# Patient Record
Sex: MALE | Race: WHITE | NOT HISPANIC OR LATINO | Employment: UNEMPLOYED | URBAN - METROPOLITAN AREA
[De-identification: names, ages, dates, MRNs, and addresses within clinical notes are randomized per-mention and may not be internally consistent; named-entity substitution may affect disease eponyms.]

---

## 2017-03-10 ENCOUNTER — ALLSCRIPTS OFFICE VISIT (OUTPATIENT)
Dept: OTHER | Facility: OTHER | Age: 27
End: 2017-03-10

## 2018-01-13 NOTE — PROGRESS NOTES
Assessment    1  PTSD (post-traumatic stress disorder) (309 81) (F43 10)   2  Bipolar disorder (296 80) (F31 9)   3  Major depression, chronic (296 20) (F32 9)    Plan   Major depression, chronic    · BuPROPion HCl ER (SR) 200 MG Oral Tablet Extended Release 12 Hour; TAKE 1  TABLET TWICE DAILY    1 - Chelsy Lee (Physician Assistant) Physician Referral  Consult Only: the expectation is that the referring provider will communicate back to the patient on treatment options  Evaluation and Treatment: the expectation is that the referred to provider will communicate back to the patient on treatment options  Status: Hold For - Scheduling  Requested for: [id number]  Ordered; For: Bipolar disorder, Major depression, chronic, PTSD (post-traumatic stress disorder); Ordered By: Robby Umaña  Performed:   Due: [id number]     Discussion/Summary  Discussion Summary:   Long discussion with patient regarding his feelings, his situation, and his treatment options  He is very interested in seeing Chan Choudhary PA-C  She treats various psych issues and is welcoming to new patients  I have referred him to [facility] P[facility]  I filled his Wellbutrin today at 200mg BID  Will not write 300mg BID due to my own comfort level  Explained to patient that I will not prescribe any mood stabilizers  He knows that if he feels he is a threat to himself or others he needs to emergently return to the ER  Patient's Capacity to Self-Care: Patient is able to Self-Care  Medication SE Review and Pt Understands Tx: Possible side effects of new medications were reviewed with the patient/guardian today  The treatment plan was reviewed with the patient/guardian  The patient/guardian understands and agrees with the treatment plan   Counseling Documentation With Imm: total time of encounter was 30 minutes and 25 minutes was spent counseling  Chief Complaint  Chief Complaint Free Text Note Form: Patient is here today for chronic mental health issues  He is from [facility] and not overly familiar with the area  Lives in [place] with his fiance'  He tells me he has a history of PTSD, chronic depression, and bipolar  He was recently in [place] for inpatient mental health in January 2017  Was prescribed Wellbutrin and Depakote  He has been out of medication for 2 weeks  He describes feeling "like a machine"  Has racing thoughts, anger, depression, irritability  He denies SI/HI  He is trying to get a job behind [employer] through [employer]  In the past, he admits to self-medicating his mental health issues which resulted in "addiction problems " He says he wants to medically treat his issues in the proper fashion  I told him that I will not prescribe Depakote  He was supposed to follow up with "Antonio" after his inpatient stay, but he states no one ever called him and that his phone calls were unreturned  He feels like he is finding a lot of "dead ends "      History of Present Illness  HPI: See chief complaint  Review of Systems  Complete-Male:   Constitutional: no fever and no chills  Cardiovascular: no chest pain  Respiratory: no shortness of breath  Psychiatric: anxiety, sleep disturbances, depression, emotional problems and impulsive behavior, but not suicidal    ROS Reviewed:   ROS reviewed  Active Problems    1  Bipolar disorder (296 80) (F31 9)   2  Major depression, chronic (296 20) (F32 9)   3  PTSD (post-traumatic stress disorder) (309 81) (F43 10)    Past Medical History  Active Problems And Past Medical History Reviewed: The active problems and past medical history were reviewed and updated today  Surgical History    1  History of Appendectomy  Surgical History Reviewed: The surgical history was reviewed and updated today  Family History  Father    1  Family history of Bipolar 1 disorder   2  Family history of depression (V17 0) (Z81 8)  Family History Reviewed: The family history was reviewed and updated today         Social History    · Current every day smoker (305 1) (F17 200)   · No alcohol use   · No living will  Social History Reviewed: The social history was reviewed and updated today  The social history was reviewed and is unchanged  Allergies    1  Penicillins    Vitals  Vital Signs    Recorded: [id number] 79:58EW   Systolic 114, LUE, Sitting   Diastolic 72, LUE, Sitting   Height 5 ft 6 5 in   Weight 131 lb 8 0 oz   BMI Calculated 20 91   BSA Calculated 1 68     Physical Exam    Constitutional   General appearance: Abnormal   underweight, disheveled clothing and unkempt appearance  Ears, Nose, Mouth, and Throat   Otoscopic examination: Tympanic membrance translucent with normal light reflex  Canals patent without erythema  Pulmonary   Respiratory effort: No increased work of breathing or signs of respiratory distress  Auscultation of lungs: Clear to auscultation, equal breath sounds bilaterally, no wheezes, no rales, no rhonci  Cardiovascular   Auscultation of heart: Normal rate and rhythm, normal S1 and S2, without murmurs  Psychiatric   Orientation to person, place and time: Normal     Mood and affect: Abnormal   Mood and Affect: blunted and expansive          Signatures   Electronically signed by : Dave Rizo [facility]; Mar 10 2017 11:11AM EST                       (Author)    Electronically signed by : Juanita Dorantes DO; Mar 10 2017  4:35PM EST

## 2018-01-22 VITALS
DIASTOLIC BLOOD PRESSURE: 72 MMHG | BODY MASS INDEX: 20.64 KG/M2 | WEIGHT: 131.5 LBS | HEIGHT: 67 IN | SYSTOLIC BLOOD PRESSURE: 104 MMHG

## 2021-09-13 ENCOUNTER — APPOINTMENT (EMERGENCY)
Dept: RADIOLOGY | Facility: HOSPITAL | Age: 31
End: 2021-09-13
Payer: OTHER GOVERNMENT

## 2021-09-13 ENCOUNTER — HOSPITAL ENCOUNTER (EMERGENCY)
Facility: HOSPITAL | Age: 31
Discharge: HOME | End: 2021-09-13
Payer: OTHER GOVERNMENT

## 2021-09-13 VITALS
HEART RATE: 73 BPM | BODY MASS INDEX: 21.14 KG/M2 | DIASTOLIC BLOOD PRESSURE: 77 MMHG | TEMPERATURE: 98.6 F | RESPIRATION RATE: 18 BRPM | WEIGHT: 132.94 LBS | SYSTOLIC BLOOD PRESSURE: 137 MMHG | OXYGEN SATURATION: 95 %

## 2021-09-13 DIAGNOSIS — S62.309A METACARPAL BONE FRACTURE: Primary | ICD-10-CM

## 2021-09-13 PROCEDURE — 73130 X-RAY EXAM OF HAND: CPT

## 2021-09-13 PROCEDURE — 99284 EMERGENCY DEPT VISIT MOD MDM: CPT | Performed by: PHYSICIAN ASSISTANT

## 2021-09-13 PROCEDURE — 26605 TREAT METACARPAL FRACTURE: CPT | Performed by: PHYSICIAN ASSISTANT

## 2021-09-13 PROCEDURE — 73120 X-RAY EXAM OF HAND: CPT

## 2021-09-13 PROCEDURE — 99283 EMERGENCY DEPT VISIT LOW MDM: CPT

## 2021-09-13 RX ORDER — LIDOCAINE HYDROCHLORIDE AND EPINEPHRINE 10; 10 MG/ML; UG/ML
10 INJECTION, SOLUTION INFILTRATION; PERINEURAL ONCE
Status: COMPLETED | OUTPATIENT
Start: 2021-09-13 | End: 2021-09-13

## 2021-09-13 RX ADMIN — LIDOCAINE HYDROCHLORIDE,EPINEPHRINE BITARTRATE 10 ML: 10; .01 INJECTION, SOLUTION INFILTRATION; PERINEURAL at 20:30

## 2021-09-13 NOTE — ED PROVIDER NOTES
History  Chief Complaint   Patient presents with    Hand Pain     pt hit wall with left hand  c/o pain on left hand     Patient presents to the emergency department today for evaluation pain in the left hand  He presents in [facility] correction [facility]  He states approximately 4 hours ago he swung and hit a wall in anger  Complains of pain only in the left 5th metacarpal region  Denies any other injuries associated with this incident  None       History reviewed  No pertinent past medical history  History reviewed  No pertinent surgical history  History reviewed  No pertinent family history  I have reviewed and agree with the history as documented  E-Cigarette/Vaping     E-Cigarette/Vaping Substances     Social History     Tobacco Use    Smoking status: Current Every Day Smoker    Smokeless tobacco: Never Used   Substance Use Topics    Alcohol use: Not Currently    Drug use: Not Currently       Review of Systems   Constitutional: Negative  HENT: Negative  Eyes: Negative  Respiratory: Negative  Cardiovascular: Negative  Gastrointestinal: Negative  Endocrine: Negative  Genitourinary: Negative  Musculoskeletal: Negative  L hand pain/swelling   Skin: Negative  Allergic/Immunologic: Negative  Neurological: Negative  Hematological: Negative  Psychiatric/Behavioral: Negative  All other systems reviewed and are negative  Physical Exam  Physical Exam  Vitals reviewed  Constitutional:       General: He is not in acute distress  Appearance: He is normal weight  He is not ill-appearing, toxic-appearing or diaphoretic  HENT:      Head: Normocephalic and atraumatic  Eyes:      General: No scleral icterus  Right eye: No discharge  Left eye: No discharge  Extraocular Movements: Extraocular movements intact  Conjunctiva/sclera: Conjunctivae normal    Cardiovascular:      Rate and Rhythm: Normal rate        Pulses: Normal pulses  Pulmonary:      Effort: Pulmonary effort is normal       Breath sounds: Normal breath sounds  Musculoskeletal:         General: Swelling, tenderness and signs of injury present  Neurological:      General: No focal deficit present  Mental Status: He is alert and oriented to person, place, and time  Mental status is at baseline  Psychiatric:         Mood and Affect: Mood normal          Behavior: Behavior normal          Thought Content: Thought content normal          Judgment: Judgment normal          Vital Signs  ED Triage Vitals   Temperature Pulse Respirations Blood Pressure SpO2   09/13/21 1926 09/13/21 1926 09/13/21 1926 09/13/21 1926 09/13/21 1926   98 6 °F (37 °C) 74 18 135/75 96 %      Temp Source Heart Rate Source Patient Position - Orthostatic VS BP Location FiO2 (%)   09/13/21 1926 09/13/21 1926 -- 09/13/21 1926 --   Temporal Monitor  Right arm       Pain Score       09/13/21 1940       5           Vitals:    09/13/21 1926   BP: 135/75   Pulse: 74         Visual Acuity      ED Medications  Medications   lidocaine-epinephrine (XYLOCAINE/EPINEPHRINE) 1 %-1:100,000 injection 10 mL (has no administration in time range)       Diagnostic Studies  Results Reviewed     None                 XR hand 2 vw left   ED Interpretation by Tracee Pedersen PA-C (09/13 2123)   Slightly improved alignment fracture reduction however fracture still noted distal left 5th metacarpal      XR hand 3+ views LEFT   ED Interpretation by Tracee Pedersen PA-C (09/13 2015)   Displaced and angulated 5th metacarpal fracture distally which is closed in nature                 Procedures  Procedures     I performed a hematoma block utilizing 2 cc 1% lidocaine with epinephrine at the distal 5th metacarpal region, manipulation was performed followed by a short-arm ulnar gutter splint, will repeat imaging  I did personally placed an ulnar gutter splint       ED Course  ED Course as of Sep 13 2130   Mon Sep 13, 2021 1951 Blood Pressure: 135/75   1951 Temperature: 98 6 °F (37 °C)   1951 Pulse: 74   1951 Respirations: 18   1951 SpO2: 96 %                                           MDM    Disposition  Final diagnoses:   Metacarpal bone fracture - Left 5th, closed, displaced and angulated     Time reflects when diagnosis was documented in both MDM as applicable and the Disposition within this note     Time User Action Codes Description Comment    9/13/2021  8:15 PM Richard Quiñones [[id number]] Metacarpal bone fracture     9/13/2021  8:15 PM Yenifer Rosa Jacob Ian Metacarpal bone fracture Left 5th, closed, displaced and angulated      ED Disposition     ED Disposition Condition Date/Time Comment    Discharge Stable Mon Sep 13, 2021  9:23 PM [address] discharge to home/self care  Follow-up Information     Follow up With Specialties Details Why Contact Info    Yoselyn Carrero MD Orthopedic Surgery, Hand Surgery, Orthopedics Call in 1 day  [address] [address]  [phone]            Patient's Medications    No medications on file     No discharge procedures on file      PDMP Review     None          ED Provider  Electronically Signed by           Matthew Grove PA-C  09/13/21 8471

## 2021-09-21 ENCOUNTER — OFFICE VISIT (OUTPATIENT)
Dept: OBGYN CLINIC | Facility: CLINIC | Age: 31
End: 2021-09-21
Payer: OTHER GOVERNMENT

## 2021-09-21 VITALS — WEIGHT: 132 LBS | BODY MASS INDEX: 20 KG/M2 | HEIGHT: 68 IN

## 2021-09-21 DIAGNOSIS — S62.357A CLOSED NONDISPLACED FRACTURE OF SHAFT OF FIFTH METACARPAL BONE OF LEFT HAND, INITIAL ENCOUNTER: Primary | ICD-10-CM

## 2021-09-21 PROCEDURE — 26600 TREAT METACARPAL FRACTURE: CPT | Performed by: ORTHOPAEDIC SURGERY

## 2021-09-21 PROCEDURE — 99203 OFFICE O/P NEW LOW 30 MIN: CPT | Performed by: ORTHOPAEDIC SURGERY

## 2021-09-21 NOTE — PROGRESS NOTES
ASSESSMENT/PLAN:    Problem List Items Addressed This Visit     None      Visit Diagnoses     Closed nondisplaced fracture of shaft of fifth metacarpal bone of left hand, initial encounter    -  Primary    Relevant Orders    Durable Medical Equipment          The patient presented to the office today for initial evaluation of a 5th metacarpal fracture of the left hand, sustained on 9/13/2021  The patient was transitioned from his splint to a TKO brace today without complication  He was instructed to keep the brace on at all times  He was instructed to keep the hand immobilized for another 4 weeks  The patient is currently residing at a correctional facility, but is scheduled to be released within the month to return back to his home in [facility]  The patient was instructed to follow up in one month with an orthopedic provider in [facility] for further management of his fracture  the patient has a stable injury needing no surgery  Physical exam shows mild swelling  No rotational deformity  Tendon and ligament function intact  X-rays reviewed show a minimally displaced left 5th metacarpal fracture  The angulation is in acceptable position  He was placed in a TKO splint  He was instructed to wear this at all times  Follow up with Orthopedics at home in 4 weeks  At that time, he will need new x-rays of his right hand -three views  The patient is a prisoner and states by the time he needs follow-up visit, he will be out of this penile facility      _____________________________________________________  CHIEF COMPLAINT:  Chief Complaint   Patient presents with    Left Hand - Pain         SUBJECTIVE:  Tal Parkinson is a 28 y o  male who presents to the office today for initial evaluation of a left hand injury  He presents to us today from a corrections facility  Patient states that on 09/13/2021 he had punched a brick wall approximately 6 times    Patient states that he experienced immediate pain and swelling in his hand  He was taken to the ED that day, where x-rays revealed a displaced fracture of the 5th metacarpal  The patient was placed in a short arm ulnar gutter splint after reduction was performed  Post reduction x-rays revealed improved alignment of the fracture  Today the patient states that he has been maintaining the short-arm splint as instructed  He reports continued pain along the 5th meta carpal   Patient denies any numbness or tingling in the hand or fingers  The following portions of the patient's history were reviewed and updated as appropriate: allergies, current medications, past family history, past medical history, past social history, past surgical history and problem list     PAST MEDICAL HISTORY:  History reviewed  No pertinent past medical history  PAST SURGICAL HISTORY:  History reviewed  No pertinent surgical history  FAMILY HISTORY:  History reviewed  No pertinent family history  SOCIAL HISTORY:  Social History     Tobacco Use    Smoking status: Current Every Day Smoker    Smokeless tobacco: Never Used   Vaping Use    Vaping Use: Former   Substance Use Topics    Alcohol use: Not Currently    Drug use: Not Currently       MEDICATIONS:  No current outpatient medications on file  ALLERGIES:  Allergies   Allergen Reactions    Penicillins Anaphylaxis       ROS:  Review of Systems   Constitutional: Negative for chills and fever  Respiratory: Negative for cough and shortness of breath  Cardiovascular: Negative for chest pain  Gastrointestinal: Negative for abdominal pain and vomiting  Musculoskeletal: Positive for joint swelling (Left hand)  Pain along the 5th metacarpal left hand   Skin: Negative for color change and rash  Neurological: Negative for weakness and numbness  All other systems reviewed and are negative  Constitutional: Negative for fatigue, fever or loss of appetite  HENT: Negative      Respiratory: Negative for shortness of breath, dyspnea  Cardiovascular: Negative for chest pain/tightness  Gastrointestinal: Negative for abdominal pain, N/V  Endocrine: Negative for cold/heat intolerance, unexplained weight loss/gain  Genitourinary: Negative for flank pain, dysuria, hematuria  Musculoskeletal: Positive for arthralgia   Skin: Negative for rash  Neurological: Negative for numbness or tingling  Psychiatric/Behavioral: Negative for agitation  _____________________________________________________  PHYSICAL EXAMINATION:    Height 5' 8" (1 727 m), weight 59 9 kg (132 lb)  Constitutional: Oriented to person, place, and time  Appears well-developed and well-nourished  No distress  HENT:   Head: Normocephalic  Eyes: Conjunctivae are normal  Right eye exhibits no discharge  Left eye exhibits no discharge  No scleral icterus  Cardiovascular: Normal rate  Pulmonary/Chest: Effort normal    Neurological: Alert and oriented to person, place, and time  Skin: Skin is warm and dry  No rash noted  Not diaphoretic  No erythema  No pallor  Psychiatric: Normal mood and affect  Behavior is normal  Judgment and thought content normal       MUSCULOSKELETAL EXAMINATION:   Physical Exam  Constitutional:       Appearance: Normal appearance  HENT:      Head: Normocephalic and atraumatic  Mouth/Throat:      Pharynx: Oropharynx is clear  Eyes:      Extraocular Movements: Extraocular movements intact  Pupils: Pupils are equal, round, and reactive to light  Cardiovascular:      Pulses: Normal pulses  Pulmonary:      Effort: Pulmonary effort is normal  No respiratory distress  Breath sounds: Normal breath sounds  Abdominal:      Palpations: Abdomen is soft  Musculoskeletal:      Cervical back: Normal range of motion  Skin:     General: Skin is warm and dry  Capillary Refill: Capillary refill takes less than 2 seconds  Neurological:      General: No focal deficit present        Mental Status: He is alert Neurovascular status: Neurovascularly intact    Distal perfusion: normal    Neurological function: normal    Range of motion: reduced    Local anesthesia used?: No    Manipulation performed?: No    Immobilization:  Brace (TKA brace)  Neurovascular status: Neurovascularly intact    Distal perfusion: normal    Neurological function: normal    Range of motion: unchanged    Patient tolerance:  Patient tolerated the procedure well with no immediate complications                 Sylvan Mcardle, PA-C

## 2022-01-12 ENCOUNTER — AMB VIDEO VISIT (OUTPATIENT)
Dept: OTHER | Facility: HOSPITAL | Age: 32
End: 2022-01-12

## 2022-01-12 ENCOUNTER — HOSPITAL ENCOUNTER (EMERGENCY)
Facility: HOSPITAL | Age: 32
Discharge: HOME | End: 2022-01-12
Attending: EMERGENCY MEDICINE

## 2022-01-12 VITALS
HEART RATE: 110 BPM | RESPIRATION RATE: 18 BRPM | OXYGEN SATURATION: 97 % | TEMPERATURE: 99 F | WEIGHT: 160 LBS | SYSTOLIC BLOOD PRESSURE: 130 MMHG | BODY MASS INDEX: 24.33 KG/M2 | DIASTOLIC BLOOD PRESSURE: 71 MMHG

## 2022-01-12 DIAGNOSIS — Z76.0 MEDICATION REFILL: Primary | ICD-10-CM

## 2022-01-12 PROCEDURE — 99284 EMERGENCY DEPT VISIT MOD MDM: CPT | Performed by: EMERGENCY MEDICINE

## 2022-01-12 PROCEDURE — 99283 EMERGENCY DEPT VISIT LOW MDM: CPT

## 2022-01-12 RX ORDER — CITALOPRAM 20 MG/1
20 TABLET ORAL ONCE
Status: COMPLETED | OUTPATIENT
Start: 2022-01-12 | End: 2022-01-12

## 2022-01-12 RX ORDER — TRAZODONE HYDROCHLORIDE 100 MG/1
100 TABLET ORAL
Qty: 5 TABLET | Refills: 0 | Status: SHIPPED | OUTPATIENT
Start: 2022-01-12 | End: 2022-01-17

## 2022-01-12 RX ORDER — DIVALPROEX SODIUM 250 MG/1
250 TABLET, DELAYED RELEASE ORAL ONCE
Status: COMPLETED | OUTPATIENT
Start: 2022-01-12 | End: 2022-01-12

## 2022-01-12 RX ORDER — CITALOPRAM 20 MG/1
20 TABLET ORAL DAILY
Qty: 5 TABLET | Refills: 0 | Status: SHIPPED | OUTPATIENT
Start: 2022-01-12 | End: 2022-01-17

## 2022-01-12 RX ORDER — TRAZODONE HYDROCHLORIDE 50 MG/1
100 TABLET ORAL ONCE
Status: COMPLETED | OUTPATIENT
Start: 2022-01-12 | End: 2022-01-12

## 2022-01-12 RX ORDER — DIVALPROEX SODIUM 250 MG/1
250 TABLET, DELAYED RELEASE ORAL EVERY 8 HOURS SCHEDULED
Qty: 15 TABLET | Refills: 0 | Status: SHIPPED | OUTPATIENT
Start: 2022-01-12 | End: 2022-01-17

## 2022-01-12 RX ADMIN — DIVALPROEX SODIUM 250 MG: 250 TABLET, DELAYED RELEASE ORAL at 23:34

## 2022-01-12 RX ADMIN — TRAZODONE HYDROCHLORIDE 100 MG: 50 TABLET ORAL at 23:34

## 2022-01-12 RX ADMIN — CITALOPRAM HYDROBROMIDE 20 MG: 20 TABLET ORAL at 23:34

## 2022-01-12 NOTE — PROGRESS NOTES
Patient called in for video vist  Requesting  medication refill  He states he was recently discharged from the [facility] today  He states he was taking trazodone, Depakote and Celexa while there  States he did not discharged with this medication  Patient takes these medications for anxiety, P PTSD, depression  He does not have any documentation of this medication  He states he left the [facility] without any medication  He does not have medication list with him  Discussed with patient I am unable to refill these medications via a video visit  Discussed he should call the [facility] senior care to see if they can prescribe medications or get him a medication list   If he develops any increased anxiety, thoughts of self-harm, racing heart or other symptoms he should go directly to the emergency room  Patient verbalized understanding of this  Discussed with patient that this visit will be canceled since I am unable to provide what is needed

## 2022-01-13 NOTE — ED NOTES
Pt had bubble pack from the [facility] and  Sates he left without getting his med to take home   Pt doesn't have a pcp here yet    depakote 250mg 3 times daily  celexa 25mg daily  trazadone at night 100mg     Rema Zhong RN  01/12/22 8770

## 2022-01-13 NOTE — ED PROVIDER NOTES
History  Chief Complaint   Patient presents with    Medication Problem     pt wasnt able to get meds before leaving his correctional facility today  pt denies HI/SI      36-year-old male presents for evaluation of medication refill  Patient states he was just released from a correctional facility this morning and was unable to get his daily medications  Patient had a video call with his PCP but was also unable to get his prescriptions  Patient called the correctional facility and was told that they would send his prescriptions to [facility] in [place] within the next few days  Patient denies any suicidal ideations but states he started to feel anxious because he did not want to abruptly stop his medications  No further symptoms at this time  Patient states he has been taking these 3 medications daily while he was in the correctional facility  None       History reviewed  No pertinent past medical history  History reviewed  No pertinent surgical history  History reviewed  No pertinent family history  I have reviewed and agree with the history as documented  E-Cigarette/Vaping    E-Cigarette Use Former User      E-Cigarette/Vaping Substances    Nicotine No     THC No     CBD No     Flavoring No     Other No     Unknown No      Social History     Tobacco Use    Smoking status: Current Every Day Smoker     Packs/day: 0 25     Types: Cigarettes    Smokeless tobacco: Never Used   Vaping Use    Vaping Use: Former   Substance Use Topics    Alcohol use: Not Currently    Drug use: Not Currently       Review of Systems   Constitutional: Negative for fever  Psychiatric/Behavioral: Negative for suicidal ideas  The patient is nervous/anxious  All other systems reviewed and are negative  Physical Exam  Physical Exam  Vitals and nursing note reviewed  Constitutional:       General: He is not in acute distress  Appearance: He is well-developed     HENT:      Head: Normocephalic and atraumatic  Right Ear: External ear normal       Left Ear: External ear normal       Nose: Nose normal    Eyes:      General: No scleral icterus  Pulmonary:      Effort: Pulmonary effort is normal  No respiratory distress  Abdominal:      General: There is no distension  Palpations: Abdomen is soft  Musculoskeletal:         General: No deformity  Normal range of motion  Cervical back: Normal range of motion and neck supple  Skin:     General: Skin is warm  Findings: No rash  Neurological:      General: No focal deficit present  Mental Status: He is alert  Gait: Gait normal    Psychiatric:         Mood and Affect: Mood normal          Vital Signs  ED Triage Vitals [01/12/22 2255]   Temperature Pulse Respirations Blood Pressure SpO2   99 °F (37 2 °C) (!) 110 18 130/71 97 %      Temp src Heart Rate Source Patient Position - Orthostatic VS BP Location FiO2 (%)   -- Monitor Sitting Right arm --      Pain Score       No Pain           Vitals:    01/12/22 2255   BP: 130/71   Pulse: (!) 110   Patient Position - Orthostatic VS: Sitting         Visual Acuity      ED Medications  Medications   divalproex sodium (DEPAKOTE) EC tablet 250 mg (has no administration in time range)   citalopram (CeleXA) tablet 20 mg (has no administration in time range)   traZODone (DESYREL) tablet 100 mg (has no administration in time range)       Diagnostic Studies  Results Reviewed     None                 No orders to display              Procedures  Procedures         ED Course                               SBIRT 22yo+      Most Recent Value   SBIRT (22 yo +)    In order to provide better care to our patients, we are screening all of our patients for alcohol and drug use  Would it be okay to ask you these screening questions? Yes Filed at: 01/12/2022 2307   Initial Alcohol Screen: US AUDIT-C     1  How often do you have a drink containing alcohol? 0 Filed at: 01/12/2022 2307   2   How many drinks containing alcohol do you have on a typical day you are drinking? 0 Filed at: 01/12/2022 2307   3a  Male UNDER 65: How often do you have five or more drinks on one occasion? 0 Filed at: 01/12/2022 2307   3b  FEMALE Any Age, or MALE 65+: How often do you have 4 or more drinks on one occassion? 0 Filed at: 01/12/2022 2307   Audit-C Score 0 Filed at: 01/12/2022 2307   EZRA: How many times in the past year have you    Used an illegal drug or used a prescription medication for non-medical reasons? Never Filed at: 01/12/2022 2307                    MDM  Number of Diagnoses or Management Options  Medication refill: new and requires workup  Diagnosis management comments: 59-year-old male presenting for medication refill  Patient was released this morning and did not get his daily medications  Will provide dose while in emergency department and also provide 5 days worth of medications until he can get the prescriptions from group home  Amount and/or Complexity of Data Reviewed  Tests in the radiology section of CPT®: reviewed  Tests in the medicine section of CPT®: reviewed and ordered  Obtain history from someone other than the patient: yes  Review and summarize past medical records: yes        Disposition  Final diagnoses:   Medication refill     Time reflects when diagnosis was documented in both MDM as applicable and the Disposition within this note     Time User Action Codes Description Comment    1/12/2022 11:13 PM Manual Rosie Add [Z76 0] Medication refill       ED Disposition     ED Disposition Condition Date/Time Comment    Discharge Stable Wed Jan 12, 2022 11:13 PM [facility] [employer] discharge to home/self care              Follow-up Information     Follow up With Specialties Details Why Contact Info Additional [address],  Family Medicine   [address] [place] [address] [facility] Emergency Department Emergency Medicine  If symptoms worsen [address]  [facility] [address]  [address] [facility] Emergency Department, [address] [place], [facility], [address]          Patient's Medications   Discharge Prescriptions    CITALOPRAM (CELEXA) 20 MG TABLET    Take 1 tablet (20 mg total) by mouth daily for 5 days       Start Date: 1/12/2022 End Date: 1/17/2022       Order Dose: 20 mg       Quantity: 5 tablet    Refills: 0    DIVALPROEX SODIUM (DEPAKOTE) 250 MG EC TABLET    Take 1 tablet (250 mg total) by mouth every 8 (eight) hours for 5 days       Start Date: 1/12/2022 End Date: 1/17/2022       Order Dose: 250 mg       Quantity: 15 tablet    Refills: 0    TRAZODONE (DESYREL) 100 MG TABLET    Take 1 tablet (100 mg total) by mouth daily at bedtime for 5 days       Start Date: 1/12/2022 End Date: 1/17/2022       Order Dose: 100 mg       Quantity: 5 tablet    Refills: 0       No discharge procedures on file      PDMP Review     None          ED Provider  Electronically Signed by           Janet Paez DO  01/12/22 9616

## 2022-12-15 ENCOUNTER — APPOINTMENT (OUTPATIENT)
Dept: URGENT CARE | Facility: CLINIC | Age: 32
End: 2022-12-15

## 2023-02-24 ENCOUNTER — TELEPHONE (OUTPATIENT)
Dept: PSYCHIATRY | Facility: CLINIC | Age: 33
End: 2023-02-24

## 2023-04-07 ENCOUNTER — TELEPHONE (OUTPATIENT)
Dept: PSYCHIATRY | Facility: CLINIC | Age: 33
End: 2023-04-07

## 2023-04-07 NOTE — TELEPHONE ENCOUNTER
"Good Morning Theodoresaritha Coronakarlo- We are looking forward to your appointment next week  There is necessary paperwork that must be read, signed and completed prior to your Virtual Visit with Galen Divina on Tuesday, 4/11/23, at 10:00 am   We will check back 30-60 minutes prior to your appointment to make sure the paperwork is complete; otherwise, we may have to temporarily cancel this appointment until the paperwork is completed and signed  Please call us at [phone] if you need assistance- we are here to help  You can sign the paperwork by registering for [employer], and completing the forms online digitally, or you can print the attached document, complete, sign and resend it to us through email or fax  You can reply to this email and attach file, or fax it to us at [phone]  If you decide to fax or email, please also send a copy of your ’s license so we can confirm your identity  Again, please call us at [phone] if you have any questions or concerns  If you are having trouble locating your documents in [employer], please see the attached how to [facility] guide  If you need help setting up [employer], instructions on how to register are also attached  You can also call [phone]), select option 5, or visit our [employer] website online at [facility] and they will have a specialist walk you through the steps      "

## 2023-06-17 ENCOUNTER — HOSPITAL ENCOUNTER (EMERGENCY)
Facility: HOSPITAL | Age: 33
End: 2023-06-17
Attending: EMERGENCY MEDICINE

## 2023-06-17 ENCOUNTER — HOSPITAL ENCOUNTER (INPATIENT)
Facility: HOSPITAL | Age: 33
LOS: 5 days | Discharge: HOME | End: 2023-06-22
Attending: STUDENT IN AN ORGANIZED HEALTH CARE EDUCATION/TRAINING PROGRAM | Admitting: STUDENT IN AN ORGANIZED HEALTH CARE EDUCATION/TRAINING PROGRAM
Payer: MEDICARE

## 2023-06-17 VITALS
SYSTOLIC BLOOD PRESSURE: 120 MMHG | OXYGEN SATURATION: 98 % | TEMPERATURE: 98.2 F | HEART RATE: 99 BPM | DIASTOLIC BLOOD PRESSURE: 77 MMHG | RESPIRATION RATE: 18 BRPM

## 2023-06-17 DIAGNOSIS — F15.10 METHAMPHETAMINE USE (HCC): ICD-10-CM

## 2023-06-17 DIAGNOSIS — F43.10 PTSD (POST-TRAUMATIC STRESS DISORDER): ICD-10-CM

## 2023-06-17 DIAGNOSIS — Z72.0 TOBACCO ABUSE: ICD-10-CM

## 2023-06-17 DIAGNOSIS — F31.9 BIPOLAR DISORDER (HCC): Primary | ICD-10-CM

## 2023-06-17 DIAGNOSIS — F90.9 ADHD: ICD-10-CM

## 2023-06-17 DIAGNOSIS — F32.A DEPRESSION WITH SUICIDAL IDEATION: Primary | ICD-10-CM

## 2023-06-17 DIAGNOSIS — R45.851 DEPRESSION WITH SUICIDAL IDEATION: Primary | ICD-10-CM

## 2023-06-17 LAB
AMPHETAMINES SERPL QL SCN: POSITIVE
BARBITURATES UR QL: NEGATIVE
BENZODIAZ UR QL: NEGATIVE
COCAINE UR QL: NEGATIVE
ETHANOL EXG-MCNC: 0 MG/DL
METHADONE UR QL: NEGATIVE
OPIATES UR QL SCN: NEGATIVE
OXYCODONE+OXYMORPHONE UR QL SCN: NEGATIVE
PCP UR QL: NEGATIVE
SARS-COV-2 RNA RESP QL NAA+PROBE: NEGATIVE
THC UR QL: POSITIVE

## 2023-06-17 PROCEDURE — 87635 SARS-COV-2 COVID-19 AMP PRB: CPT | Performed by: EMERGENCY MEDICINE

## 2023-06-17 PROCEDURE — 99285 EMERGENCY DEPT VISIT HI MDM: CPT

## 2023-06-17 PROCEDURE — 80307 DRUG TEST PRSMV CHEM ANLYZR: CPT | Performed by: EMERGENCY MEDICINE

## 2023-06-17 PROCEDURE — 82075 ASSAY OF BREATH ETHANOL: CPT | Performed by: EMERGENCY MEDICINE

## 2023-06-17 RX ORDER — POLYETHYLENE GLYCOL 3350 17 G/17G
17 POWDER, FOR SOLUTION ORAL DAILY PRN
Status: CANCELLED | OUTPATIENT
Start: 2023-06-17

## 2023-06-17 RX ORDER — ACETAMINOPHEN 325 MG/1
650 TABLET ORAL EVERY 6 HOURS PRN
Status: DISCONTINUED | OUTPATIENT
Start: 2023-06-17 | End: 2023-06-22 | Stop reason: HOSPADM

## 2023-06-17 RX ORDER — OLANZAPINE 10 MG/1
5 INJECTION, POWDER, LYOPHILIZED, FOR SOLUTION INTRAMUSCULAR
Status: CANCELLED | OUTPATIENT
Start: 2023-06-17

## 2023-06-17 RX ORDER — BENZTROPINE MESYLATE 1 MG/1
1 TABLET ORAL
Status: DISCONTINUED | OUTPATIENT
Start: 2023-06-17 | End: 2023-06-22 | Stop reason: HOSPADM

## 2023-06-17 RX ORDER — BISACODYL 10 MG
10 SUPPOSITORY, RECTAL RECTAL DAILY PRN
Status: CANCELLED | OUTPATIENT
Start: 2023-06-17

## 2023-06-17 RX ORDER — BENZTROPINE MESYLATE 1 MG/ML
1 INJECTION INTRAMUSCULAR; INTRAVENOUS
Status: CANCELLED | OUTPATIENT
Start: 2023-06-17

## 2023-06-17 RX ORDER — AMOXICILLIN 250 MG
1 CAPSULE ORAL DAILY PRN
Status: CANCELLED | OUTPATIENT
Start: 2023-06-17

## 2023-06-17 RX ORDER — ACETAMINOPHEN 325 MG/1
975 TABLET ORAL EVERY 6 HOURS PRN
Status: DISCONTINUED | OUTPATIENT
Start: 2023-06-17 | End: 2023-06-22 | Stop reason: HOSPADM

## 2023-06-17 RX ORDER — NICOTINE 21 MG/24HR
21 PATCH, TRANSDERMAL 24 HOURS TRANSDERMAL ONCE
Status: DISCONTINUED | OUTPATIENT
Start: 2023-06-18 | End: 2023-06-18

## 2023-06-17 RX ORDER — PROPRANOLOL HYDROCHLORIDE 10 MG/1
10 TABLET ORAL EVERY 8 HOURS PRN
Status: DISCONTINUED | OUTPATIENT
Start: 2023-06-17 | End: 2023-06-22 | Stop reason: HOSPADM

## 2023-06-17 RX ORDER — ACETAMINOPHEN 325 MG/1
975 TABLET ORAL EVERY 6 HOURS PRN
Status: CANCELLED | OUTPATIENT
Start: 2023-06-17

## 2023-06-17 RX ORDER — HYDROXYZINE HYDROCHLORIDE 25 MG/1
25 TABLET, FILM COATED ORAL
Status: CANCELLED | OUTPATIENT
Start: 2023-06-17

## 2023-06-17 RX ORDER — ACETAMINOPHEN 325 MG/1
650 TABLET ORAL EVERY 6 HOURS PRN
Status: CANCELLED | OUTPATIENT
Start: 2023-06-17

## 2023-06-17 RX ORDER — BENZTROPINE MESYLATE 1 MG/ML
1 INJECTION INTRAMUSCULAR; INTRAVENOUS
Status: DISCONTINUED | OUTPATIENT
Start: 2023-06-17 | End: 2023-06-22 | Stop reason: HOSPADM

## 2023-06-17 RX ORDER — OLANZAPINE 2.5 MG/1
2.5 TABLET ORAL
Status: CANCELLED | OUTPATIENT
Start: 2023-06-17

## 2023-06-17 RX ORDER — TRAZODONE HYDROCHLORIDE 50 MG/1
50 TABLET ORAL
Status: DISCONTINUED | OUTPATIENT
Start: 2023-06-17 | End: 2023-06-22 | Stop reason: HOSPADM

## 2023-06-17 RX ORDER — OLANZAPINE 10 MG/1
2.5 INJECTION, POWDER, LYOPHILIZED, FOR SOLUTION INTRAMUSCULAR
Status: CANCELLED | OUTPATIENT
Start: 2023-06-17

## 2023-06-17 RX ORDER — HYDROXYZINE 50 MG/1
50 TABLET, FILM COATED ORAL
Status: DISCONTINUED | OUTPATIENT
Start: 2023-06-17 | End: 2023-06-22 | Stop reason: HOSPADM

## 2023-06-17 RX ORDER — DIPHENHYDRAMINE HYDROCHLORIDE 50 MG/ML
50 INJECTION INTRAMUSCULAR; INTRAVENOUS EVERY 6 HOURS PRN
Status: CANCELLED | OUTPATIENT
Start: 2023-06-17

## 2023-06-17 RX ORDER — DIPHENHYDRAMINE HYDROCHLORIDE 50 MG/ML
50 INJECTION INTRAMUSCULAR; INTRAVENOUS EVERY 6 HOURS PRN
Status: DISCONTINUED | OUTPATIENT
Start: 2023-06-17 | End: 2023-06-22 | Stop reason: HOSPADM

## 2023-06-17 RX ORDER — AMOXICILLIN 250 MG
1 CAPSULE ORAL DAILY PRN
Status: DISCONTINUED | OUTPATIENT
Start: 2023-06-17 | End: 2023-06-22 | Stop reason: HOSPADM

## 2023-06-17 RX ORDER — NICOTINE 21 MG/24HR
21 PATCH, TRANSDERMAL 24 HOURS TRANSDERMAL ONCE
Status: CANCELLED | OUTPATIENT
Start: 2023-06-18

## 2023-06-17 RX ORDER — OLANZAPINE 5 MG/1
5 TABLET ORAL
Status: CANCELLED | OUTPATIENT
Start: 2023-06-17

## 2023-06-17 RX ORDER — OLANZAPINE 10 MG/1
5 INJECTION, POWDER, LYOPHILIZED, FOR SOLUTION INTRAMUSCULAR
Status: DISCONTINUED | OUTPATIENT
Start: 2023-06-17 | End: 2023-06-22 | Stop reason: HOSPADM

## 2023-06-17 RX ORDER — OLANZAPINE 10 MG/1
2.5 INJECTION, POWDER, LYOPHILIZED, FOR SOLUTION INTRAMUSCULAR
Status: DISCONTINUED | OUTPATIENT
Start: 2023-06-17 | End: 2023-06-22 | Stop reason: HOSPADM

## 2023-06-17 RX ORDER — MAGNESIUM HYDROXIDE/ALUMINUM HYDROXICE/SIMETHICONE 120; 1200; 1200 MG/30ML; MG/30ML; MG/30ML
30 SUSPENSION ORAL EVERY 4 HOURS PRN
Status: DISCONTINUED | OUTPATIENT
Start: 2023-06-17 | End: 2023-06-22 | Stop reason: HOSPADM

## 2023-06-17 RX ORDER — HYDROXYZINE HYDROCHLORIDE 25 MG/1
100 TABLET, FILM COATED ORAL
Status: CANCELLED | OUTPATIENT
Start: 2023-06-17

## 2023-06-17 RX ORDER — HYDROXYZINE 50 MG/1
100 TABLET, FILM COATED ORAL
Status: DISCONTINUED | OUTPATIENT
Start: 2023-06-17 | End: 2023-06-22 | Stop reason: HOSPADM

## 2023-06-17 RX ORDER — LORAZEPAM 2 MG/ML
2 INJECTION INTRAMUSCULAR EVERY 6 HOURS PRN
Status: CANCELLED | OUTPATIENT
Start: 2023-06-17

## 2023-06-17 RX ORDER — OLANZAPINE 5 MG/1
5 TABLET ORAL
Status: DISCONTINUED | OUTPATIENT
Start: 2023-06-17 | End: 2023-06-22 | Stop reason: HOSPADM

## 2023-06-17 RX ORDER — POLYETHYLENE GLYCOL 3350 17 G/17G
17 POWDER, FOR SOLUTION ORAL DAILY PRN
Status: DISCONTINUED | OUTPATIENT
Start: 2023-06-17 | End: 2023-06-22 | Stop reason: HOSPADM

## 2023-06-17 RX ORDER — ACETAMINOPHEN 325 MG/1
650 TABLET ORAL EVERY 4 HOURS PRN
Status: CANCELLED | OUTPATIENT
Start: 2023-06-17

## 2023-06-17 RX ORDER — NICOTINE 21 MG/24HR
21 PATCH, TRANSDERMAL 24 HOURS TRANSDERMAL ONCE
Status: DISCONTINUED | OUTPATIENT
Start: 2023-06-17 | End: 2023-06-17 | Stop reason: HOSPADM

## 2023-06-17 RX ORDER — BISACODYL 10 MG
10 SUPPOSITORY, RECTAL RECTAL DAILY PRN
Status: DISCONTINUED | OUTPATIENT
Start: 2023-06-17 | End: 2023-06-22 | Stop reason: HOSPADM

## 2023-06-17 RX ORDER — PROPRANOLOL HYDROCHLORIDE 20 MG/1
10 TABLET ORAL EVERY 8 HOURS PRN
Status: CANCELLED | OUTPATIENT
Start: 2023-06-17

## 2023-06-17 RX ORDER — TRAZODONE HYDROCHLORIDE 50 MG/1
50 TABLET ORAL
Status: CANCELLED | OUTPATIENT
Start: 2023-06-17

## 2023-06-17 RX ORDER — HYDROXYZINE HYDROCHLORIDE 25 MG/1
25 TABLET, FILM COATED ORAL
Status: DISCONTINUED | OUTPATIENT
Start: 2023-06-17 | End: 2023-06-22 | Stop reason: HOSPADM

## 2023-06-17 RX ORDER — LORAZEPAM 2 MG/ML
2 INJECTION INTRAMUSCULAR EVERY 6 HOURS PRN
Status: DISCONTINUED | OUTPATIENT
Start: 2023-06-17 | End: 2023-06-22 | Stop reason: HOSPADM

## 2023-06-17 RX ORDER — OLANZAPINE 2.5 MG/1
2.5 TABLET ORAL
Status: DISCONTINUED | OUTPATIENT
Start: 2023-06-17 | End: 2023-06-22 | Stop reason: HOSPADM

## 2023-06-17 RX ORDER — MAGNESIUM HYDROXIDE/ALUMINUM HYDROXICE/SIMETHICONE 120; 1200; 1200 MG/30ML; MG/30ML; MG/30ML
30 SUSPENSION ORAL EVERY 4 HOURS PRN
Status: CANCELLED | OUTPATIENT
Start: 2023-06-17

## 2023-06-17 RX ORDER — HYDROXYZINE HYDROCHLORIDE 25 MG/1
50 TABLET, FILM COATED ORAL
Status: CANCELLED | OUTPATIENT
Start: 2023-06-17

## 2023-06-17 RX ORDER — BENZTROPINE MESYLATE 1 MG/1
1 TABLET ORAL
Status: CANCELLED | OUTPATIENT
Start: 2023-06-17

## 2023-06-17 RX ORDER — ACETAMINOPHEN 325 MG/1
650 TABLET ORAL EVERY 4 HOURS PRN
Status: DISCONTINUED | OUTPATIENT
Start: 2023-06-17 | End: 2023-06-22 | Stop reason: HOSPADM

## 2023-06-17 RX ADMIN — NICOTINE POLACRILEX 2 MG: 4 GUM, CHEWING BUCCAL at 17:33

## 2023-06-17 RX ADMIN — NICOTINE 21 MG: 21 PATCH, EXTENDED RELEASE TRANSDERMAL at 11:18

## 2023-06-17 RX ADMIN — ACETAMINOPHEN 325MG 650 MG: 325 TABLET ORAL at 18:26

## 2023-06-17 RX ADMIN — TRAZODONE HYDROCHLORIDE 50 MG: 50 TABLET ORAL at 21:53

## 2023-06-17 NOTE — PLAN OF CARE
Problem: SELF HARM/SUICIDALITY  Goal: Will have no self-injury during hospital stay  Description: INTERVENTIONS:  - Q 15 MINUTES: Routine safety checks  - Q WAKING SHIFT & PRN: Assess risk to determine if routine checks are adequate to maintain patient safety  - Encourage patient to participate actively in care by formulating a plan to combat response to suicidal ideation, identify supports and resources  Outcome: Progressing     Problem: DEPRESSION  Goal: Will be euthymic at discharge  Description: INTERVENTIONS:  - Administer medication as ordered  - Provide emotional support via 1:1 interaction with staff  - Encourage involvement in milieu/groups/activities  - Monitor for social isolation  Outcome: Progressing     Problem: ANXIETY  Goal: Will report anxiety at manageable levels  Description: INTERVENTIONS:  - Administer medication as ordered  - Teach and encourage coping skills  - Provide emotional support  - Assess patient/family for anxiety and ability to cope  Outcome: Progressing  Goal: By discharge: Patient will verbalize 2 strategies to deal with anxiety  Description: Interventions:  - Identify any obvious source/trigger to anxiety  - Staff will assist patient in applying identified coping technique/skills  - Encourage attendance of scheduled groups and activities  Outcome: Progressing     Problem: SUBSTANCE USE/ABUSE  Goal: Will have no detox symptoms and will verbalize plan for changing substance-related behavior  Description: INTERVENTIONS:  - Monitor physical status and assess for symptoms of withdrawal  - Administer medication as ordered  - Provide emotional support with 1 on 1 interaction with staff  - Encourage recovery focused program/ addiction education  - Assess for verbalization of changing behaviors related to substance abuse  - Initiate consults and referrals as appropriate (Case Management, Spiritual Care, etc )  Outcome: Progressing  Goal: By discharge, will develop insight into their chemical dependency and sustain motivation to continue in recovery  Description: INTERVENTIONS:  - Attends all daily group sessions and scheduled AA groups  - Actively practices coping skills through participation in the therapeutic community and adherence to program rules  - Reviews and completes assignments from individual treatment plan  - Assist patient development of understanding of their personal cycle of addiction and relapse triggers  Outcome: Progressing  Goal: By discharge, patient will have ongoing treatment plan addressing chemical dependency  Description: INTERVENTIONS:  - Assist patient with resources and/or appointments for ongoing recovery based living  Outcome: Progressing     Problem: SLEEP DISTURBANCE  Goal: Will exhibit normal sleeping pattern  Description: Interventions:  -  Assess the patients sleep pattern, noting recent changes  - Administer medication as ordered  - Decrease environmental stimuli, including noise, as appropriate during the night  - Encourage the patient to actively participate in unit groups and or exercise during the day to enhance ability to achieve adequate sleep at night  - Assess the patient, in the morning, encouraging a description of sleep experience  Outcome: Progressing

## 2023-06-17 NOTE — ED NOTES
Insurance Authorization for admission:   Phone call placed to [facility]    Phone number:[phone]   Spoke to [address] w Medicare Part A- Patient has not utilized any [employer] days   Level of care:inpatient Review on Day of admission and they will give authorization #    Mary Reddy
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
## 2023-06-17 NOTE — ED NOTES
Patient is accepted at [facility]  Patient is accepted by Dr Yunier Rawls and the attending Dr Jayy Rosales **  Transportation is arranged with [facility]  Transportation is scheduled for TBD  Patient may go to the floor [phone] report is to be called to [phone] prior to patient transfer        Rock Dede MS
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
## 2023-06-17 NOTE — ED NOTES
Spoke with [employer] and they are reporting that Patient has [facility] [employer] [facility]      So a COB needs to be done once we find a bed     Karen Chapman MS

## 2023-06-17 NOTE — NURSING NOTE
Patient belongings in locker:    1 pair of basketball  1 pair sunglasses  1 cologne spray  1 pair ear buds  1 watch  1 white tshirt sleevless  1 carry-on grocery bag      At bedside:    2 pairs underwear  1 black tshirt
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
## 2023-06-17 NOTE — EMTALA/ACUTE CARE TRANSFER
[facility] EMERGENCY DEPARTMENT  [address]  [address] [facility] [address]  Dept: [phone]      QSLQGH TRANSFER CONSENT    NAME Gisele Xie                                         1990                              MRN [id number]    I have been informed of my rights regarding examination, treatment, and transfer   by Dr Robyn Watts DO    Benefits:      Risks:        Consent for Transfer:  I acknowledge that my medical condition has been evaluated and explained to me by the emergency department physician or other qualified medical person and/or my attending physician, who has recommended that I be transferred to the service of  Accepting Physician: Dr Bogdan Alba and Dr Reji Dnaiel at [address] Name, [address] : Dannie Finely [address]  The above potential benefits of such transfer, the potential risks associated with such transfer, and the probable risks of not being transferred have been explained to me, and I fully understand them  The doctor has explained that, in my case, the benefits of transfer outweigh the risks  I agree to be transferred  I authorize the performance of emergency medical procedures and treatments upon me in both transit and upon arrival at the receiving facility  Additionally, I authorize the release of any and all medical records to the receiving facility and request they be transported with me, if possible  I understand that the safest mode of transportation during a medical emergency is an ambulance and that the Hospital advocates the use of this mode of transport  Risks of traveling to the receiving facility by car, including absence of medical control, life sustaining equipment, such as oxygen, and medical personnel has been explained to me and I fully understand them      (RUI CORRECT BOX BELOW)  [  ]  I consent to the stated transfer and to be transported by ambulance/helicopter  [  ]  I consent to the stated transfer, but refuse transportation by ambulance and accept full responsibility for my transportation by car  I understand the risks of non-ambulance transfers and I exonerate the Hospital and its staff from any deterioration in my condition that results from this refusal     X___________________________________________    DATE  23  TIME________  Signature of patient or legally responsible individual signing on patient behalf           RELATIONSHIP TO PATIENT_________________________          Provider Certification    NAME Kelsi Shaffer                                         1990                              MRN [id number]    A medical screening exam was performed on the above named patient  Based on the examination:    Condition Necessitating Transfer The primary encounter diagnosis was Depression with suicidal ideation  Diagnoses of PTSD (post-traumatic stress disorder), ADHD, and [facility] use ([facility] ) were also pertinent to this visit  Patient Condition:      Reason for Transfer:      Transfer Requirements: [address], [address]   · Space available and qualified personnel available for treatment as acknowledged by    · Agreed to accept transfer and to provide appropriate medical treatment as acknowledged by       Dr Kris Velez and Dr Elizbeth Habermann  · Appropriate medical records of the examination and treatment of the patient are provided at the time of transfer   [address], [address] _______  · Transfer will be performed by qualified personnel from    and appropriate transfer equipment as required, including the use of necessary and appropriate life support measures      Provider Certification: I have examined the patient and explained the following risks and benefits of being transferred/refusing transfer to the patient/family:         Based on these reasonable risks and benefits to the patient and/or the unborn child(alissa), and based upon the information available at the time of the patient’s examination, I certify that the medical benefits reasonably to be expected from the provision of appropriate medical treatments at another medical facility outweigh the increasing risks, if any, to the individual’s medical condition, and in the case of labor to the unborn child, from effecting the transfer      X____________________________________________ DATE 06/17/23        TIME_______      ORIGINAL - SEND TO MEDICAL RECORDS   COPY - SEND WITH PATIENT DURING TRANSFER

## 2023-06-18 PROBLEM — F12.90 CANNABIS USE DISORDER: Status: ACTIVE | Noted: 2023-06-18

## 2023-06-18 PROBLEM — F15.10 METHAMPHETAMINE USE (HCC): Status: ACTIVE | Noted: 2023-06-18

## 2023-06-18 PROBLEM — Z72.0 TOBACCO ABUSE: Status: ACTIVE | Noted: 2023-06-18

## 2023-06-18 PROBLEM — F31.9 BIPOLAR DISORDER (HCC): Status: ACTIVE | Noted: 2023-06-18

## 2023-06-18 PROBLEM — F41.9 ANXIETY: Status: ACTIVE | Noted: 2023-06-18

## 2023-06-18 PROBLEM — F43.10 PTSD (POST-TRAUMATIC STRESS DISORDER): Status: ACTIVE | Noted: 2023-06-18

## 2023-06-18 PROBLEM — Z00.8 MEDICAL CLEARANCE FOR PSYCHIATRIC ADMISSION: Status: ACTIVE | Noted: 2023-06-18

## 2023-06-18 PROBLEM — F33.9 RECURRENT MAJOR DEPRESSIVE DISORDER (HCC): Status: ACTIVE | Noted: 2023-06-18

## 2023-06-18 LAB
ANION GAP SERPL CALCULATED.3IONS-SCNC: 4 MMOL/L (ref 4–13)
BASOPHILS # BLD AUTO: 0.04 THOUSANDS/ÂΜL (ref 0–0.1)
BASOPHILS NFR BLD AUTO: 1 % (ref 0–1)
BUN SERPL-MCNC: 14 MG/DL (ref 5–25)
CALCIUM SERPL-MCNC: 9.2 MG/DL (ref 8.4–10.2)
CHLORIDE SERPL-SCNC: 105 MMOL/L (ref 96–108)
CHOLEST SERPL-MCNC: 118 MG/DL
CO2 SERPL-SCNC: 30 MMOL/L (ref 21–32)
CREAT SERPL-MCNC: 1.05 MG/DL (ref 0.6–1.3)
EOSINOPHIL # BLD AUTO: 0.18 THOUSAND/ÂΜL (ref 0–0.61)
EOSINOPHIL NFR BLD AUTO: 3 % (ref 0–6)
ERYTHROCYTE [DISTWIDTH] IN BLOOD BY AUTOMATED COUNT: 12.9 % (ref 11.6–15.1)
GFR SERPL CREATININE-BSD FRML MDRD: 93 ML/MIN/1.73SQ M
GLUCOSE P FAST SERPL-MCNC: 90 MG/DL (ref 65–99)
GLUCOSE SERPL-MCNC: 90 MG/DL (ref 65–140)
HCT VFR BLD AUTO: 40.5 % (ref 36.5–49.3)
HDLC SERPL-MCNC: 45 MG/DL
HGB BLD-MCNC: 13.3 G/DL (ref 12–17)
IMM GRANULOCYTES # BLD AUTO: 0.02 THOUSAND/UL (ref 0–0.2)
IMM GRANULOCYTES NFR BLD AUTO: 0 % (ref 0–2)
LDLC SERPL CALC-MCNC: 59 MG/DL (ref 0–100)
LYMPHOCYTES # BLD AUTO: 3.31 THOUSANDS/ÂΜL (ref 0.6–4.47)
LYMPHOCYTES NFR BLD AUTO: 47 % (ref 14–44)
MAGNESIUM SERPL-MCNC: 2.6 MG/DL (ref 1.9–2.7)
MCH RBC QN AUTO: 31.1 PG (ref 26.8–34.3)
MCHC RBC AUTO-ENTMCNC: 32.8 G/DL (ref 31.4–37.4)
MCV RBC AUTO: 95 FL (ref 82–98)
MONOCYTES # BLD AUTO: 0.51 THOUSAND/ÂΜL (ref 0.17–1.22)
MONOCYTES NFR BLD AUTO: 7 % (ref 4–12)
NEUTROPHILS # BLD AUTO: 2.96 THOUSANDS/ÂΜL (ref 1.85–7.62)
NEUTS SEG NFR BLD AUTO: 42 % (ref 43–75)
NONHDLC SERPL-MCNC: 73 MG/DL
NRBC BLD AUTO-RTO: 0 /100 WBCS
PLATELET # BLD AUTO: 260 THOUSANDS/UL (ref 149–390)
PMV BLD AUTO: 10.6 FL (ref 8.9–12.7)
POTASSIUM SERPL-SCNC: 3.9 MMOL/L (ref 3.5–5.3)
RBC # BLD AUTO: 4.28 MILLION/UL (ref 3.88–5.62)
SODIUM SERPL-SCNC: 139 MMOL/L (ref 135–147)
TRIGL SERPL-MCNC: 72 MG/DL
TSH SERPL DL<=0.05 MIU/L-ACNC: 1.55 UIU/ML (ref 0.45–4.5)
VIT B12 SERPL-MCNC: 202 PG/ML (ref 180–914)
WBC # BLD AUTO: 7.02 THOUSAND/UL (ref 4.31–10.16)

## 2023-06-18 PROCEDURE — 83735 ASSAY OF MAGNESIUM: CPT

## 2023-06-18 PROCEDURE — 80048 BASIC METABOLIC PNL TOTAL CA: CPT

## 2023-06-18 PROCEDURE — 85025 COMPLETE CBC W/AUTO DIFF WBC: CPT

## 2023-06-18 PROCEDURE — 80061 LIPID PANEL: CPT

## 2023-06-18 PROCEDURE — 82652 VIT D 1 25-DIHYDROXY: CPT

## 2023-06-18 PROCEDURE — 99223 1ST HOSP IP/OBS HIGH 75: CPT | Performed by: STUDENT IN AN ORGANIZED HEALTH CARE EDUCATION/TRAINING PROGRAM

## 2023-06-18 PROCEDURE — 84443 ASSAY THYROID STIM HORMONE: CPT

## 2023-06-18 PROCEDURE — 99253 IP/OBS CNSLTJ NEW/EST LOW 45: CPT | Performed by: PHYSICIAN ASSISTANT

## 2023-06-18 PROCEDURE — 82607 VITAMIN B-12: CPT

## 2023-06-18 RX ORDER — NICOTINE 21 MG/24HR
1 PATCH, TRANSDERMAL 24 HOURS TRANSDERMAL DAILY
Status: DISCONTINUED | OUTPATIENT
Start: 2023-06-18 | End: 2023-06-22 | Stop reason: HOSPADM

## 2023-06-18 RX ORDER — CITALOPRAM HYDROBROMIDE 10 MG/1
10 TABLET ORAL DAILY
Status: DISCONTINUED | OUTPATIENT
Start: 2023-06-18 | End: 2023-06-22 | Stop reason: HOSPADM

## 2023-06-18 RX ORDER — OLANZAPINE 5 MG/1
5 TABLET ORAL
Status: DISCONTINUED | OUTPATIENT
Start: 2023-06-18 | End: 2023-06-19

## 2023-06-18 RX ADMIN — HYDROXYZINE HYDROCHLORIDE 50 MG: 50 TABLET, FILM COATED ORAL at 08:58

## 2023-06-18 RX ADMIN — CITALOPRAM HYDROBROMIDE 10 MG: 10 TABLET ORAL at 11:28

## 2023-06-18 RX ADMIN — OLANZAPINE 5 MG: 5 TABLET, FILM COATED ORAL at 21:26

## 2023-06-18 RX ADMIN — NICOTINE 1 PATCH: 21 PATCH, EXTENDED RELEASE TRANSDERMAL at 12:13

## 2023-06-18 RX ADMIN — NICOTINE POLACRILEX 2 MG: 4 GUM, CHEWING BUCCAL at 09:12

## 2023-06-18 RX ADMIN — TRAZODONE HYDROCHLORIDE 50 MG: 50 TABLET ORAL at 21:29

## 2023-06-18 RX ADMIN — NICOTINE POLACRILEX 2 MG: 4 GUM, CHEWING BUCCAL at 19:54

## 2023-06-18 NOTE — ASSESSMENT & PLAN NOTE
· Vital signs stable at time of assessment  · CBC, CMP, TSH pending  · EKG: NSR normal QTc HR 66  · Patient appears medically stable at this time for inpatient psychiatric treatment

## 2023-06-18 NOTE — TREATMENT TEAM
06/18/23 0800   Team Meeting   Meeting Type Daily Rounds   Team Members Present   Team Members Present Physician;Nurse   Physician Team Member Shawn uBsh   Nursing Team Member Renetta   Patient/Family Present   Patient Present No   Patient's Family Present No       AM rounds- New admit 201- SI to OD on meth  Recent relapse on meth after 4 years sober  Currently homeless, recently ended a relationship  Unemployed         Readmit score- 12

## 2023-06-18 NOTE — CONSULTS
[address] [facility]  Consult  Name: Cynthia Aragon 29 y o  male I MRN: [id number]  Unit/Bed#: Silviano Roth 215-02 I Date of Admission: 6/17/2023   Date of Service: 6/18/2023 I Hospital Day: 1    Inpatient consult for Medical Clearance for [facility] patient  Consult performed by: Derick Sommers PA-C  Consult ordered by: JAM Albarran          Assessment/Plan   Medical clearance for psychiatric admission  Assessment & Plan  · Vital signs stable at time of assessment  · CBC, CMP, TSH pending  · EKG: NSR normal QTc HR 66  · Patient appears medically stable at this time for inpatient psychiatric treatment    Tobacco abuse  Assessment & Plan  · Nicotine patch ordered  ·  cessation    Methamphetamine use ([address] )  Assessment & Plan  · UDS positive  ·  cessation  · EKG unremarkable on admission    * Bipolar disorder [facility])  Assessment & Plan  · Presented to the emergency department due to worsening depression, suicidal ideation  · Further plan per psychiatry          VTE Prophylaxis:   Low Risk (Score 0-2) - Encourage Ambulation  Recommendations for Discharge:  · Follow-up with PCP after discharge    Total Time Spent on Date of Encounter in care of patient: 35 minutes This time was spent on one or more of the following: performing physical exam; counseling and coordination of care; obtaining or reviewing history; documenting in the medical record; reviewing/ordering tests, medications or procedures; communicating with other healthcare professionals and discussing with patient's family/caregivers  Collaboration of Care: Were Recommendations Directly Discussed with Primary Treatment Team? No    History of Present Illness:  Cynthia Aragon is a 29 y o  male who is originally admitted to the psychiatry service due to depression, SI  We are consulted for medical clearance  Patient with past medical history of polysubstance abuse    Patient presented to the emergency department after relapsing on methamphetamines  Patient noted worsening depression and suicidal ideation  Denies any physical complaints including chest pain/palpitations, shortness of breath, nausea/vomiting, abdominal pain, fever/chills  Review of Systems:  Review of Systems   Constitutional: Negative for chills, fatigue, fever and unexpected weight change  HENT: Negative for congestion, sore throat and trouble swallowing  Eyes: Negative for photophobia, pain and visual disturbance  Respiratory: Negative for cough, shortness of breath and wheezing  Cardiovascular: Negative for chest pain, palpitations and leg swelling  Gastrointestinal: Negative for abdominal pain, constipation, diarrhea, nausea and vomiting  Endocrine: Negative for polyuria  Genitourinary: Negative for difficulty urinating, dysuria, flank pain, hematuria and urgency  Musculoskeletal: Negative for back pain, myalgias, neck pain and neck stiffness  Skin: Negative for pallor and rash  Neurological: Negative for dizziness, tremors, syncope, speech difficulty, weakness, light-headedness and headaches  Hematological: Does not bruise/bleed easily  Psychiatric/Behavioral: Positive for dysphoric mood and suicidal ideas  Negative for agitation and confusion  Past Medical and Surgical History:   History reviewed  No pertinent past medical history  No past surgical history on file  Meds/Allergies:  all medications and allergies reviewed    Allergies:    Allergies   Allergen Reactions   • Penicillins Anaphylaxis       Social History:  Marital Status: Single  Substance Use History:   Social History     Substance and Sexual Activity   Alcohol Use Yes   • Alcohol/week: 1 0 standard drink of alcohol   • Types: 1 Cans of beer per week    Comment: social drinking 1-2 beers     Social History     Tobacco Use   Smoking Status Every Day   • Packs/day: 0 50   • Types: Cigarettes   Smokeless Tobacco Never     Social History     Substance and "Sexual Activity   Drug Use Yes   • Types: Methamphetamines, Marijuana       Family History:  History reviewed  No pertinent family history  Physical Exam:   Vitals:   Blood Pressure: 96/55 (06/18/23 0750)  Pulse: 66 (06/18/23 0750)  Temperature: 98 4 °F (36 9 °C) (06/18/23 0750)  Temp Source: Tympanic (06/18/23 0750)  Respirations: 16 (06/18/23 0750)  Height: 5' 8\" (172 7 cm) (06/17/23 1615)  Weight - Scale: 58 1 kg (128 lb) (06/17/23 1615)  SpO2: 99 % (06/17/23 1614)    Physical Exam  Vitals and nursing note reviewed  Constitutional:       General: He is not in acute distress  Appearance: He is well-developed  Comments: No acute distress   HENT:      Head: Normocephalic and atraumatic  Eyes:      General: No scleral icterus  Extraocular Movements: Extraocular movements intact  Conjunctiva/sclera: Conjunctivae normal    Cardiovascular:      Rate and Rhythm: Normal rate and regular rhythm  Heart sounds: No murmur heard  Pulmonary:      Effort: Pulmonary effort is normal  No respiratory distress  Breath sounds: Normal breath sounds  No wheezing, rhonchi or rales  Abdominal:      General: Bowel sounds are normal       Palpations: Abdomen is soft  Tenderness: There is no abdominal tenderness  There is no guarding or rebound  Musculoskeletal:         General: No swelling  Cervical back: Normal range of motion  Comments: Able to move upper/lower extremities bilaterally, no edema   Skin:     General: Skin is warm and dry  Capillary Refill: Capillary refill takes less than 2 seconds  Neurological:      Mental Status: He is alert and oriented to person, place, and time     Psychiatric:         Mood and Affect: Mood normal          Speech: Speech normal          Behavior: Behavior normal           Additional Data:   Lab Results:    Results from last 7 days   Lab Units 06/18/23  0605   WBC Thousand/uL 7 02   HEMOGLOBIN g/dL 13 3   HEMATOCRIT % 40 5   PLATELETS " "Thousands/uL 260   NEUTROS PCT % 42*   LYMPHS PCT % 47*   MONOS PCT % 7   EOS PCT % 3     Results from last 7 days   Lab Units 06/18/23  0605   SODIUM mmol/L 139   POTASSIUM mmol/L 3 9   CHLORIDE mmol/L 105   CO2 mmol/L 30   BUN mg/dL 14   CREATININE mg/dL 1 05   ANION GAP mmol/L 4   CALCIUM mg/dL 9 2   GLUCOSE RANDOM mg/dL 90             No results found for: \"HGBA1C\"            Imaging: No pertinent imaging reviewed  No orders to display       EKG, Pathology, and Other Studies Reviewed on Admission:   · EKG: NSR  HR 66     ** Please Note: This note may have been constructed using a voice recognition system   **    "

## 2023-06-18 NOTE — NURSING NOTE
Bedside  - Green T-shirt  - [employer] jeans [address] card  - [facility] ID  - Green lighter  - Silver bracelet  - $0 10   - Various papers  - Work Boots  - [employer]

## 2023-06-18 NOTE — NURSING NOTE
Pt awake and visible in earlier part of morning, napping throughout the afternoon  Depressed mood, hopeless  Elevated anxiety, received PRN Atarax  Currently resting in bed, Encouraged to shower, walk halls, attend groups, watch TV to get OOB and participate in treatment
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
## 2023-06-18 NOTE — NURSING NOTE
Late entry due to patient care for 06/17/2023 at 2230: Patient expressing increased anxiety relating to belongings including his social security card, state ID, clothing, and work shoes not coming to the unit with him when he admitted today  Security notified security at [address] and located items  Items brought over and brought up to unit  patient thanking security and staff  Items inventoried and patient again thanking staff expressing he will be able to sleep tonight

## 2023-06-18 NOTE — ASSESSMENT & PLAN NOTE
· Presented to the emergency department due to worsening depression, suicidal ideation  · Further plan per psychiatry

## 2023-06-18 NOTE — NURSING NOTE
Patient requested and received Atarax 50 mg PO for moderate anxiety at 0858  Barton Anxiety Scale score was 20  At 6451, patient stated he was feeling better and was getting a little sleepy

## 2023-06-18 NOTE — NURSING NOTE
Patient requested and received Tylenol 650 mg PO for c/o headache 3/10 at [facility] 694  At 1926, he stated he was feeling better  Medication effective  At 2153, patient requested and received Trazodone 50 mg PO for sleep  At 2253, patient appears to be sleeping in bed in no acute distress  Medication appears to be effective

## 2023-06-18 NOTE — TREATMENT PLAN
TREATMENT PLAN REVIEW - [address] Bel[address]an 29 y o  1990 male MRN: [id number]    [address] [place] [address] Room / Bed: [place] [address] Encounter: [id number]          Admit Date/Time:  6/17/2023  4:15 PM    Treatment Team: Attending Provider: Daniel Hatfield MD; Registered Nurse: Naya Simental RN (Inactive); Patient Care Technician: Nicola Morgan; Registered Nurse: Horacio Boston RN; Registered Nurse: Camila Castillo, EDWIN; Physician Assistant: Nancy Du PA-C; Nursing Student: Lizette Villegas; Registered Nurse: Gayathri Driscoll RN; Registered Nurse: Jamshid Rodriguez RN; Charge Nurse: Yunier Lobo RN; Patient Care Assistant: Benjy Cat;  Patient Care Assistant: Jayson Guillaume; Security: [employer]    Diagnosis: Principal Problem:    Bipolar disorder [facility])  Active Problems:    Medical clearance for psychiatric admission    Recurrent major depressive disorder ([facility] )    Anxiety    Methamphetamine use ([facility] )    Cannabis use disorder      Patient Strengths/Assets: cooperative, motivation for treatment/growth, patient is on a voluntary commitment    Patient Barriers/Limitations: homeless, limited support system, marital/family conflict, noncompliant with treatment, substance abuse    Short Term Goals: decrease in depressive symptoms, decrease in anxiety symptoms, decrease in suicidal thoughts, decrease in self abusive behaviors, improvement in insight, improvement in self care, sleep improvement, improvement in appetite, mood stabilization, increase in group attendance    Long Term Goals: improvement in depression, improvement in anxiety, resolution of depressive symptoms, resolution of manic symptoms, stabilization of mood, free of suicidal thoughts, no self abusive behavior, improved insight, acceptance of need for psychiatric medications, acceptance of need for psychiatric treatment    Progress Towards Goals: starting psychiatric medications as prescribed    Recommended Treatment: medication management, patient medication education, group therapy, milieu therapy, continued Behavioral Health psychiatric evaluation/assessment process    Treatment Frequency: daily medication monitoring, group and milieu therapy daily, monitoring through interdisciplinary rounds, monitoring through weekly patient care conferences    Expected Discharge Date:  5-7 days    Discharge Plan: referral for outpatient medication management with a psychiatrist, referral for outpatient psychotherapy    Treatment Plan Created/Updated By: Nicole Bermudez MD

## 2023-06-18 NOTE — CMS CERTIFICATION NOTE
Recertification: Based upon physical, mental and social evaluations, I certify that inpatient psychiatric services continue to be medically necessary for this patient for a duration of 7 midnights for the treatment of  Bipolar disorder [facility])   Available alternative community resources still do not meet the patient's mental health care needs  I further attest that an established written individualized plan of care has been updated and is outlined in the patient's medical records

## 2023-06-19 PROCEDURE — 99232 SBSQ HOSP IP/OBS MODERATE 35: CPT | Performed by: STUDENT IN AN ORGANIZED HEALTH CARE EDUCATION/TRAINING PROGRAM

## 2023-06-19 RX ADMIN — NICOTINE 1 PATCH: 21 PATCH, EXTENDED RELEASE TRANSDERMAL at 09:42

## 2023-06-19 RX ADMIN — HYDROXYZINE HYDROCHLORIDE 50 MG: 50 TABLET, FILM COATED ORAL at 17:33

## 2023-06-19 RX ADMIN — NICOTINE POLACRILEX 2 MG: 4 GUM, CHEWING BUCCAL at 12:34

## 2023-06-19 RX ADMIN — OLANZAPINE 7.5 MG: 2.5 TABLET, FILM COATED ORAL at 21:06

## 2023-06-19 RX ADMIN — CITALOPRAM HYDROBROMIDE 10 MG: 10 TABLET ORAL at 09:39

## 2023-06-19 NOTE — NURSING NOTE
Pt is pleasant and cooperative on approach  Reports disrupted sleep overnight and having lucid dreams  Pt is motivated for sobriety and trying to focus on caring for self  Pt misses his daughter but states he needs to put himself first and get the help needed  Writer provided support and encouragement  Pt c/o generalized body aches r/t relapse, being awake for several days PTA  Pt states mood is improving and feels meds are helping  Denies SI/HI/AVH  Pt reports struggling with paranoia and intrusive thoughts PTA  Writer reviewed that Zyprexa may be helpful for these symptoms as well as sleep  Writer encouraged pt to attend groups

## 2023-06-19 NOTE — NURSING NOTE
Michi Media is calm upon approach, visualized resting in bed and napping at the beginning of the shift  Patient reports feeling tired today, which he attributes to sleeping poorly overnight and the titration of Zyprexa  Michi Media denies current SI/HI/AH/VH, endorses positive mood this afternoon  Patient reports goal for this evening is to get his laundry done  Michi Media remains compliant with prescribed medication and is observed socializing with select peers on the unit  Patient encouraged to attend evening wrap-up group and to seek staff with any issues and/or concerns, verbalized understanding

## 2023-06-19 NOTE — NURSING NOTE
Patient is visible and social in the milieu  Pleasant in conversation  Denies SI  HI, and hallucinations  Talks about a vivid dream he had last night where he was very aware of all of his senses ( smell, touch, taste) and would like to speak more about this with the doctor  Continues with depressed mood, and reporting some increased anxiety  Reporting PRN medication and his Nicotine gum assisted with his anxiety  Denies questions or concerns  Staff availability reinforced

## 2023-06-19 NOTE — NURSING NOTE
Pt received PRN Atarax 50 mg at 1733 for moderate anxiety  Upon follow up pt appears calmer, walking the unit and occasionally socializing with staff and peers

## 2023-06-19 NOTE — PROGRESS NOTES
Status: Pt is a [facility] who presented with increased depression & SI   Pt reportedly got into an argument with his significant other, who kicked him out & he relapsed on meth while staying in a hotel  Pt tearful upon admission, reported he had 4 yr sober prior to this  Pt reported that his significant other's family gets involved & creates tension in their relationship  Pt is a registered sex offender & needs to register his address by the end of June  Pt appeared to have slept overnight  Reviewed readmit score: 21(Yellow), AUDIT: 2, PAWSS: 2, BAT: 0, UDS: + amph/meth and THC  Medication: Celexa and Zyprexa started / PRN - Atarax & Trazodone  D/C: TBD / new admission      06/19/23 0750   Team Meeting   Meeting Type Daily Rounds   Team Members Present   Team Members Present Physician;Nurse; Other (Discipline and Name);    Physician Team Member Dr Reji Daniel / Dr Rocky Suresh / Hayley Eden / Nael Crowell Team Member Pratibha Alvarez / Silas Cruz / [facility] Management Team Member Ayleen Doss / Valerie James / Chele Gleason / Sonya Peacock   Other (Discipline and Name) Luis(art therapy)   Patient/Family Present   Patient Present No   Patient's Family Present No

## 2023-06-19 NOTE — PLAN OF CARE
Problem: SELF HARM/SUICIDALITY  Goal: Will have no self-injury during hospital stay  Description: INTERVENTIONS:  - Q 15 MINUTES: Routine safety checks  - Q WAKING SHIFT & PRN: Assess risk to determine if routine checks are adequate to maintain patient safety  - Encourage patient to participate actively in care by formulating a plan to combat response to suicidal ideation, identify supports and resources  Outcome: Progressing     Problem: DEPRESSION  Goal: Will be euthymic at discharge  Description: INTERVENTIONS:  - Administer medication as ordered  - Provide emotional support via 1:1 interaction with staff  - Encourage involvement in milieu/groups/activities  - Monitor for social isolation  Outcome: Progressing     Problem: ANXIETY  Goal: Will report anxiety at manageable levels  Description: INTERVENTIONS:  - Administer medication as ordered  - Teach and encourage coping skills  - Provide emotional support  - Assess patient/family for anxiety and ability to cope  Outcome: Progressing  Goal: By discharge: Patient will verbalize 2 strategies to deal with anxiety  Description: Interventions:  - Identify any obvious source/trigger to anxiety  - Staff will assist patient in applying identified coping technique/skills  - Encourage attendance of scheduled groups and activities  Outcome: Progressing     Problem: SUBSTANCE USE/ABUSE  Goal: Will have no detox symptoms and will verbalize plan for changing substance-related behavior  Description: INTERVENTIONS:  - Monitor physical status and assess for symptoms of withdrawal  - Administer medication as ordered  - Provide emotional support with 1 on 1 interaction with staff  - Encourage recovery focused program/ addiction education  - Assess for verbalization of changing behaviors related to substance abuse  - Initiate consults and referrals as appropriate (Case Management, Spiritual Care, etc )  Outcome: Progressing  Goal: By discharge, will develop insight into their chemical dependency and sustain motivation to continue in recovery  Description: INTERVENTIONS:  - Attends all daily group sessions and scheduled AA groups  - Actively practices coping skills through participation in the therapeutic community and adherence to program rules  - Reviews and completes assignments from individual treatment plan  - Assist patient development of understanding of their personal cycle of addiction and relapse triggers  Outcome: Progressing  Goal: By discharge, patient will have ongoing treatment plan addressing chemical dependency  Description: INTERVENTIONS:  - Assist patient with resources and/or appointments for ongoing recovery based living  Outcome: Progressing     Problem: SLEEP DISTURBANCE  Goal: Will exhibit normal sleeping pattern  Description: Interventions:  -  Assess the patients sleep pattern, noting recent changes  - Administer medication as ordered  - Decrease environmental stimuli, including noise, as appropriate during the night  - Encourage the patient to actively participate in unit groups and or exercise during the day to enhance ability to achieve adequate sleep at night  - Assess the patient, in the morning, encouraging a description of sleep experience  Outcome: Progressing     Problem: Nutrition/Hydration-ADULT  Goal: Nutrient/Hydration intake appropriate for improving, restoring or maintaining nutritional needs  Description: Monitor and assess patient's nutrition/hydration status for malnutrition  Collaborate with interdisciplinary team and initiate plan and interventions as ordered  Monitor patient's weight and dietary intake as ordered or per policy  Utilize nutrition screening tool and intervene as necessary  Determine patient's food preferences and provide high-protein, high-caloric foods as appropriate       INTERVENTIONS:  - Monitor oral intake, urinary output, labs, and treatment plans  - Assess nutrition and hydration status and recommend course of action  - Evaluate amount of meals eaten  - Assist patient with eating if necessary   - Allow adequate time for meals  - Recommend/ encourage appropriate diets, oral nutritional supplements, and vitamin/mineral supplements  - Order, calculate, and assess calorie counts as needed  - Recommend, monitor, and adjust tube feedings and TPN/PPN based on assessed needs  - Assess need for intravenous fluids  - Provide specific nutrition/hydration education as appropriate  - Include patient/family/caregiver in decisions related to nutrition  Outcome: Progressing

## 2023-06-19 NOTE — PROGRESS NOTES
"Progress Note - [facility] 29 y o  male MRN: [id number]   Unit/Bed#: -02 Encounter: [id number]    Behavior over the last 24 hours: ashwin Wei is a 30 yo male with PMH of bipolar, MDD, anxiety presenting for psychiatric follow up  He states he feels \"stressed\" in regards to plans after discharge  He hopes to get established anywhere he could be accepted into  He states that he does not regret his meth relapse as it was an eye opener and \"a blessing and a curse\" for him to move forward and get help  He states that he does not plan on using meth or other drugs after discharge  He denies suicidal intent or ideation  He reports that the medications are helping him as it makes him more calm and less depressed  He denies any side effects  He states that he is having lucid dreams and chills at night, but states that these are chronic      Sleep: decreased  Appetite: fair  Medication side effects: No   ROS: reports chills, all other systems are negative    Mental Status Evaluation:    Appearance:  age appropriate, casually dressed, dressed appropriately   Behavior:  pleasant, cooperative   Speech:  normal rate and volume, talkative   Mood:  \"stressed\"   Affect:  mood-congruent   Thought Process:  logical, slightly less tangential   Associations: intact associations   Thought Content:  normal   Perceptual Disturbances: no auditory hallucinations, no visual hallucinations   Risk Potential: Suicidal ideation - None  Homicidal ideation - None  Potential for aggression - No   Sensorium:  oriented to person, place and time/date   Memory:  recent and remote memory grossly intact   Consciousness:  alert and awake   Attention/Concentration: attention span and concentration appear shorter than expected for age   Insight:  limited   Judgment: limited   Gait/Station: normal gait/station   Motor Activity: no abnormal movements     Vital signs in last 24 hours:    Temp:  [97 3 °F (36 3 °C)-97 8 " °F (36 6 °C)] 97 3 °F (36 3 °C)  HR:  [64-72] 64  Resp:  [15-16] 15  BP: (121)/(66-73) 121/73    Laboratory results: I have personally reviewed all pertinent laboratory/tests results    Results from the past 24 hours: No results found for this or any previous visit (from the past 24 hour(s))  Most Recent Labs:   Lab Results   Component Value Date    WBC 7 02 06/18/2023    RBC 4 28 06/18/2023    HGB 13 3 06/18/2023    HCT 40 5 06/18/2023     06/18/2023    RDW 12 9 06/18/2023    NEUTROABS 2 96 06/18/2023    SODIUM 139 06/18/2023    K 3 9 06/18/2023     06/18/2023    CO2 30 06/18/2023    BUN 14 06/18/2023    CREATININE 1 05 06/18/2023    GLUC 90 06/18/2023    CALCIUM 9 2 06/18/2023    CHOLESTEROL 118 06/18/2023    HDL 45 06/18/2023    TRIG 72 06/18/2023    LDLCALC 59 06/18/2023    [place] 73 06/18/2023    [id number] 1 552 06/18/2023       Progress Toward Goals: progressing    Assessment/Plan   Principal Problem:    Bipolar disorder ([facility] [address] )  Active Problems:    Medical clearance for psychiatric admission    Recurrent major depressive disorder (HCC)    Anxiety    Methamphetamine use (HCC)    Cannabis use disorder    PTSD (post-traumatic stress disorder)    Tobacco abuse      Recommended Treatment:     Planned medication and treatment changes: All current active medications have been reviewed  Encourage group therapy, milieu therapy and occupational therapy  Behavioral Health checks every 7 minutes    Increase Zyprexa to 7 5mg PO tonight for mood stabilization and sleep  Continue Celexa      Current Facility-Administered Medications   Medication Dose Route Frequency Provider Last Rate   • acetaminophen  650 mg Oral Q6H PRN Ashley Rylan Abt, CRNP     • acetaminophen  650 mg Oral Q4H PRN Ashley Rylan Abt, CRNP     • acetaminophen  975 mg Oral Q6H PRN Ashley Rylan Abt, CRNP     • aluminum-magnesium hydroxide-simethicone  30 mL Oral Q4H PRN JAM Barrera     • benztropine  1 mg Intramuscular Q4H PRN Max 6/day Ashley Anibala Dong, CRNP     • benztropine  1 mg Oral Q4H PRN Max 6/day Ashley Antwon Umana, CRNP     • bisacodyl  10 mg Rectal Daily PRN Ashley Umana, CRNP     • citalopram  10 mg Oral Daily Travis John MD     • hydrOXYzine HCL  50 mg Oral Q6H PRN Max 4/day Garcia JAM Schultz      Or   • diphenhydrAMINE  50 mg Intramuscular Q6H PRN Ashley Umana, CRNP     • glycerin-hypromellose-  1 drop Both Eyes Q3H PRN Ashley Umana, CRNP     • hydrOXYzine HCL  100 mg Oral Q6H PRN Max 4/day Garcia JAM Schultz      Or   • LORazepam  2 mg Intramuscular Q6H PRN Ashley Umana, CRNP     • hydrOXYzine HCL  25 mg Oral Q6H PRN Max 4/day Garcia JAM Schultz     • nicotine  1 patch Transdermal Daily Travis John MD     • nicotine polacrilex  2 mg Oral Q2H PRN Ashley Umana, CRNP     • OLANZapine  5 mg Oral Q4H PRN Max 3/day JAM Bell      Or   • OLANZapine  2 5 mg Intramuscular Q4H PRN Max 3/day Garcia JAM Schultz     • OLANZapine  5 mg Oral Q3H PRN Max 3/day JAM Bell      Or   • OLANZapine  5 mg Intramuscular Q3H PRN Max 3/day JAM Quiros     • OLANZapine  2 5 mg Oral Q4H PRN Max 6/day Garcia JAM Schultz     • OLANZapine  5 mg Oral HS Travis John MD     • polyethylene glycol  17 g Oral Daily PRN Ashley Umana CRNP     • propranolol  10 mg Oral Q8H PRN Ashley Umana CRNP     • senna-docusate sodium  1 tablet Oral Daily PRN Ashley Umana, CRNP     • traZODone  50 mg Oral HS PRN JAM Bell       Risks / Benefits of Treatment:    Risks, benefits, and possible side effects of medications explained to patient and patient verbalizes understanding and agreement for treatment      Counseling / Coordination of Care:    Patient's progress discussed with staff in treatment team meeting  Medications, treatment progress and treatment plan reviewed with patient      David Doyle PA-C 06/19/23

## 2023-06-19 NOTE — NURSING NOTE
F/U to Trazodone administration at 2129 hrs as sleep aid, patient laying in bed appeared to be asleep  Easily aroused  No difficulty observed

## 2023-06-20 PROCEDURE — 99232 SBSQ HOSP IP/OBS MODERATE 35: CPT | Performed by: STUDENT IN AN ORGANIZED HEALTH CARE EDUCATION/TRAINING PROGRAM

## 2023-06-20 RX ADMIN — ALUMINUM HYDROXIDE, MAGNESIUM HYDROXIDE, AND SIMETHICONE 30 ML: 200; 200; 20 SUSPENSION ORAL at 12:14

## 2023-06-20 RX ADMIN — NICOTINE POLACRILEX 2 MG: 4 GUM, CHEWING BUCCAL at 12:17

## 2023-06-20 RX ADMIN — HYDROXYZINE HYDROCHLORIDE 100 MG: 50 TABLET, FILM COATED ORAL at 15:44

## 2023-06-20 RX ADMIN — NICOTINE 1 PATCH: 21 PATCH, EXTENDED RELEASE TRANSDERMAL at 09:23

## 2023-06-20 RX ADMIN — CITALOPRAM HYDROBROMIDE 10 MG: 10 TABLET ORAL at 09:21

## 2023-06-20 RX ADMIN — OLANZAPINE 7.5 MG: 2.5 TABLET, FILM COATED ORAL at 20:55

## 2023-06-20 RX ADMIN — NICOTINE POLACRILEX 4 MG: 4 GUM, CHEWING BUCCAL at 20:55

## 2023-06-20 NOTE — NURSING NOTE
Pt reported anxiety 10/10 after phone call with ex wife  Barton score 25, atarax 100 mg given  Pt reported medication effective

## 2023-06-20 NOTE — CASE MANAGEMENT
CM attempted to meet with Pt, but he was sleeping soundly in his bed  CM knocked several times on door frame & called his name, but he did not respond; CM will attempt again later

## 2023-06-20 NOTE — PROGRESS NOTES
Treatment Plan Meeting:  Diagnosis of Bipolar disorder, Recurrent major depressive disorder,  Anxiety, Methamphetamine use, and Cannabis use disorder reviewed  Discussed short term goals for decrease in depressive symptoms, decrease in anxiety symptoms, decrease in suicidal thoughts, decrease in self abusive behaviors, improvement in insight, improvement in self care, sleep improvement, improvement in appetite, mood stabilization, and increase in group attendance  At this time no discharge date is set  All parties in agreement & treatment plan was signed       06/19/23 1000   Team Meeting   Meeting Type Tx Team Meeting   Initial Conference Date 06/19/23   Next Conference Date 07/15/23   Team Members Present   Team Members Present Physician;Nurse;   Physician Team Member Dr Josi Francis Team Member [address] Management Team Member Gianna   Patient/Family Present   Patient Present No  (Pt declined to meet with Treatment Team )   Patient's Family Present No

## 2023-06-20 NOTE — NURSING NOTE
Pt reports feeling anxious at time of interaction  Pt reports missing his daughter and it is hard to be away from home  Pt reports continued lucid dreams overnight about returning home as though nothing had happened  Pt states this is hard to deal with as he is not allowed to return home at this time  Pt is focused on needing to attend rehab upon d/c as he feels he would use drugs again  Pt states he is motivated for treatment and will take whatever steps necessary  Pt has been slightly more visible today, social with select peers and intermittenlty walking halls  Denies SI or any thoughts of self harm  Pt reports feeling better by end of conversation and felt talking was therapeutic

## 2023-06-20 NOTE — PROGRESS NOTES
[facility] Group Note     06/20/23 1400   Activity/Group Checklist   Group Personal control  (Mindfulness Technique - Guided Meditation and open discussion about happiness and purpose)   Attendance Attended   Attendance Duration (min) 31-45  (in and out of group)   Interactions Interacted appropriately   Affect/Mood Appropriate;Bright   Goals Achieved Identified feelings; Discussed coping strategies; Able to listen to others; Able to engage in interactions; Able to reflect/comment on own behavior;Able to self-disclose; Able to recieve feedback; Able to give feedback to another

## 2023-06-20 NOTE — PROGRESS NOTES
"Progress Note - 49 [facility] 29 y o  male MRN: [id number]   Unit/Bed#: U 215-02 Encounter: [id number]    Behavior over the last 24 hours: improved  Ijeoma Real is a 28 yo male with PMH of bipolar, MDD, anxiety presenting for psychiatric follow up  Upon interview, he is calm, cooperative, and more goal directed  He reports \"feeling better\" and more \"leveled out  \" He is requesting to go into rehab, as he states he feels vulnerable, would not feel safe, would use drugs if it were in front of him  He states that rehab would be for his own good as he has a whole life ahead of him  He reports sleeping better with no chills  He states that he was anxious yesterday but PRN Atarax helped  He states that his medications are helping and he is tolerating them well  He denies suicidal ideation, suicidal intent, auditory or visual hallucinations      Sleep: improved  Appetite: normal  Medication side effects: No   ROS: no complaints, all other systems are negative    Mental Status Evaluation:    Appearance:  age appropriate, casually dressed   Behavior:  normal, pleasant, cooperative   Speech:  normal rate and volume   Mood:  \"better\"   Affect:  appropriate, mood-congruent   Thought Process:  organized, logical, goal directed, linear   Associations: intact associations   Thought Content:  normal   Perceptual Disturbances: no auditory hallucinations, no visual hallucinations   Risk Potential: Suicidal ideation - None  Homicidal ideation - None  Potential for aggression - No   Sensorium:  oriented to person, place and time/date   Memory:  recent and remote memory grossly intact   Consciousness:  alert and awake   Attention/Concentration: attention span and concentration are age appropriate   Insight:  improving   Judgment: improving   Gait/Station: normal gait/station   Motor Activity: no abnormal movements     Vital signs in last 24 hours:    Temp:  [96 8 °F (36 °C)-97 8 °F (36 6 °C)] 96 8 °F (36 °C)  HR: " [72-75] 72  Resp:  [16] 16  BP: (113-114)/(69-72) 113/69    Laboratory results: I have personally reviewed all pertinent laboratory/tests results    Results from the past 24 hours: No results found for this or any previous visit (from the past 24 hour(s))  Most Recent Labs:   Lab Results   Component Value Date    WBC 7 02 06/18/2023    RBC 4 28 06/18/2023    HGB 13 3 06/18/2023    HCT 40 5 06/18/2023     06/18/2023    RDW 12 9 06/18/2023    NEUTROABS 2 96 06/18/2023    SODIUM 139 06/18/2023    K 3 9 06/18/2023     06/18/2023    CO2 30 06/18/2023    BUN 14 06/18/2023    CREATININE 1 05 06/18/2023    GLUC 90 06/18/2023    CALCIUM 9 2 06/18/2023    CHOLESTEROL 118 06/18/2023    HDL 45 06/18/2023    TRIG 72 06/18/2023    LDLCALC 59 06/18/2023    [place] 73 06/18/2023    [id number] 1 552 06/18/2023       Progress Toward Goals: progressing    Assessment/Plan   Principal Problem:    Bipolar disorder ([facility] [address] )  Active Problems:    Medical clearance for psychiatric admission    Recurrent major depressive disorder (HCC)    Anxiety    Methamphetamine use (HCC)    Cannabis use disorder    PTSD (post-traumatic stress disorder)    Tobacco abuse      Recommended Treatment:     Planned medication and treatment changes: All current active medications have been reviewed  Encourage group therapy, milieu therapy and occupational therapy  Behavioral Health checks every 7 minutes    Continue all medications  Patient tolerating medications and mood has improved  Discharge planning possibly to inpatient drug and alcohol rehabilitation        Current Facility-Administered Medications   Medication Dose Route Frequency Provider Last Rate   • acetaminophen  650 mg Oral Q6H PRN JAM Oliver     • acetaminophen  650 mg Oral Q4H PRN JAM Oliver     • acetaminophen  975 mg Oral Q6H PRN JAM Oliver     • aluminum-magnesium hydroxide-simethicone  30 mL Oral Q4H PRN Ashley Rainey CRNP     • benztropine  1 mg Intramuscular Q4H PRN Max 6/day Ashley Kofi Billings, CRNP     • benztropine  1 mg Oral Q4H PRN Max 6/day Ashley Kofi Billings, CRNP     • bisacodyl  10 mg Rectal Daily PRN Ashley Kofi Billings, CRNP     • citalopram  10 mg Oral Daily Nicole Bermudez MD     • hydrOXYzine HCL  50 mg Oral Q6H PRN Max 4/day Nazia LineJAM Marks      Or   • diphenhydrAMINE  50 mg Intramuscular Q6H PRN Ashley Kofi Billings, CRNP     • glycerin-hypromellose-  1 drop Both Eyes Q3H PRN Ashley Kofi Billings, CRNP     • hydrOXYzine HCL  100 mg Oral Q6H PRN Max 4/day JAM Sheldon      Or   • LORazepam  2 mg Intramuscular Q6H PRN Ashley Kofi Billings, CRNP     • hydrOXYzine HCL  25 mg Oral Q6H PRN Max 4/day KENISHA SheldonNP     • nicotine  1 patch Transdermal Daily Nicole Bermudez MD     • nicotine polacrilex  2 mg Oral Q2H PRN Ashley Kofi Billings, CRNP     • OLANZapine  5 mg Oral Q4H PRN Max 3/day Ashley Kofi Billings, CRNP      Or   • OLANZapine  2 5 mg Intramuscular Q4H PRN Max 3/day Ashley Kofi Billings, CRNP     • OLANZapine  5 mg Oral Q3H PRN Max 3/day Ashley Kofi Billings, CRNP      Or   • OLANZapine  5 mg Intramuscular Q3H PRN Max 3/day Nazia Garay CRNP     • OLANZapine  2 5 mg Oral Q4H PRN Max 6/day JAM Sheldon     • OLANZapine  7 5 mg Oral HS Anastasiya Zaidi PA-C     • polyethylene glycol  17 g Oral Daily PRN Ashley Kofi Billings, CRNP     • propranolol  10 mg Oral Q8H PRN Ashley Kofi Billings, CRNP     • senna-docusate sodium  1 tablet Oral Daily PRN Ashley Kofi Billings, CRNP     • traZODone  50 mg Oral HS PRN JAM Montiel       Risks / Benefits of Treatment:    Risks, benefits, and possible side effects of medications explained to patient and patient verbalizes understanding and agreement for treatment      Counseling / Coordination of Care:    Patient's progress discussed with staff in treatment team meeting  Medications, treatment progress and treatment plan reviewed with patient      Adalberto Michelle PA-C 06/20/23
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
## 2023-06-20 NOTE — PROGRESS NOTES
Status: Pt reports that his mood has improved, but napped throughout the day, reported poor sleep the night prior & titration of Zyprexa  Pt denies any SI/HI or hallucinations  Pt reported moderated anxiety last evening & received PRN & slept overnight  Medication: Zyprexa increased to 7 5mg HS / PRN - Atarax  D/C: Unknown / CM did attempt to meet with Pt, but he was sleeping  CM will attempt again today  06/20/23 0750   Team Meeting   Meeting Type Daily Rounds   Team Members Present   Team Members Present Physician;Nurse; Other (Discipline and Name);    Physician Team Member Dr Layne Irving / Dr Dung Sanchez / Won Mackey / Shanika Majano Team Member Ganga Wolff / Enoch Frankel Management Team Member Kirt Segura / Yara Gibbons / Magdalene Iglesias / Karen Pathak   Other (Discipline and Name) Luis(art therapy)   Patient/Family Present   Patient Present No   Patient's Family Present No

## 2023-06-20 NOTE — PLAN OF CARE
Problem: SELF HARM/SUICIDALITY  Goal: Will have no self-injury during hospital stay  Description: INTERVENTIONS:  - Q 15 MINUTES: Routine safety checks  - Q WAKING SHIFT & PRN: Assess risk to determine if routine checks are adequate to maintain patient safety  - Encourage patient to participate actively in care by formulating a plan to combat response to suicidal ideation, identify supports and resources  Outcome: Progressing     Problem: DEPRESSION  Goal: Will be euthymic at discharge  Description: INTERVENTIONS:  - Administer medication as ordered  - Provide emotional support via 1:1 interaction with staff  - Encourage involvement in milieu/groups/activities  - Monitor for social isolation  Outcome: Progressing     Problem: ANXIETY  Goal: Will report anxiety at manageable levels  Description: INTERVENTIONS:  - Administer medication as ordered  - Teach and encourage coping skills  - Provide emotional support  - Assess patient/family for anxiety and ability to cope  Outcome: Progressing  Goal: By discharge: Patient will verbalize 2 strategies to deal with anxiety  Description: Interventions:  - Identify any obvious source/trigger to anxiety  - Staff will assist patient in applying identified coping technique/skills  - Encourage attendance of scheduled groups and activities  Outcome: Progressing     Problem: SUBSTANCE USE/ABUSE  Goal: Will have no detox symptoms and will verbalize plan for changing substance-related behavior  Description: INTERVENTIONS:  - Monitor physical status and assess for symptoms of withdrawal  - Administer medication as ordered  - Provide emotional support with 1 on 1 interaction with staff  - Encourage recovery focused program/ addiction education  - Assess for verbalization of changing behaviors related to substance abuse  - Initiate consults and referrals as appropriate (Case Management, Spiritual Care, etc )  Outcome: Progressing  Goal: By discharge, will develop insight into their chemical dependency and sustain motivation to continue in recovery  Description: INTERVENTIONS:  - Attends all daily group sessions and scheduled AA groups  - Actively practices coping skills through participation in the therapeutic community and adherence to program rules  - Reviews and completes assignments from individual treatment plan  - Assist patient development of understanding of their personal cycle of addiction and relapse triggers  Outcome: Progressing  Goal: By discharge, patient will have ongoing treatment plan addressing chemical dependency  Description: INTERVENTIONS:  - Assist patient with resources and/or appointments for ongoing recovery based living  Outcome: Progressing     Problem: SLEEP DISTURBANCE  Goal: Will exhibit normal sleeping pattern  Description: Interventions:  -  Assess the patients sleep pattern, noting recent changes  - Administer medication as ordered  - Decrease environmental stimuli, including noise, as appropriate during the night  - Encourage the patient to actively participate in unit groups and or exercise during the day to enhance ability to achieve adequate sleep at night  - Assess the patient, in the morning, encouraging a description of sleep experience  Outcome: Progressing     Problem: Nutrition/Hydration-ADULT  Goal: Nutrient/Hydration intake appropriate for improving, restoring or maintaining nutritional needs  Description: Monitor and assess patient's nutrition/hydration status for malnutrition  Collaborate with interdisciplinary team and initiate plan and interventions as ordered  Monitor patient's weight and dietary intake as ordered or per policy  Utilize nutrition screening tool and intervene as necessary  Determine patient's food preferences and provide high-protein, high-caloric foods as appropriate       INTERVENTIONS:  - Monitor oral intake, urinary output, labs, and treatment plans  - Assess nutrition and hydration status and recommend course of action  - Evaluate amount of meals eaten  - Assist patient with eating if necessary   - Allow adequate time for meals  - Recommend/ encourage appropriate diets, oral nutritional supplements, and vitamin/mineral supplements  - Order, calculate, and assess calorie counts as needed  - Recommend, monitor, and adjust tube feedings and TPN/PPN based on assessed needs  - Assess need for intravenous fluids  - Provide specific nutrition/hydration education as appropriate  - Include patient/family/caregiver in decisions related to nutrition  Outcome: Progressing     Problem: Ineffective Coping  Goal: Participates in unit activities  Description: Interventions:  - Provide therapeutic environment   - Provide required programming   - Redirect inappropriate behaviors   Outcome: Progressing
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
## 2023-06-21 PROBLEM — Z00.8 MEDICAL CLEARANCE FOR PSYCHIATRIC ADMISSION: Status: RESOLVED | Noted: 2023-06-18 | Resolved: 2023-06-21

## 2023-06-21 LAB — 1,25(OH)2D3 SERPL-MCNC: 36.7 PG/ML (ref 24.8–81.5)

## 2023-06-21 PROCEDURE — 99232 SBSQ HOSP IP/OBS MODERATE 35: CPT | Performed by: STUDENT IN AN ORGANIZED HEALTH CARE EDUCATION/TRAINING PROGRAM

## 2023-06-21 RX ADMIN — HYDROXYZINE HYDROCHLORIDE 100 MG: 50 TABLET, FILM COATED ORAL at 20:04

## 2023-06-21 RX ADMIN — NICOTINE 1 PATCH: 21 PATCH, EXTENDED RELEASE TRANSDERMAL at 08:55

## 2023-06-21 RX ADMIN — NICOTINE POLACRILEX 4 MG: 4 GUM, CHEWING BUCCAL at 18:49

## 2023-06-21 RX ADMIN — OLANZAPINE 7.5 MG: 2.5 TABLET, FILM COATED ORAL at 21:38

## 2023-06-21 RX ADMIN — HYDROXYZINE HYDROCHLORIDE 50 MG: 50 TABLET, FILM COATED ORAL at 13:09

## 2023-06-21 RX ADMIN — ACETAMINOPHEN 975 MG: 325 TABLET, FILM COATED ORAL at 18:49

## 2023-06-21 RX ADMIN — CITALOPRAM HYDROBROMIDE 10 MG: 10 TABLET ORAL at 08:54

## 2023-06-21 RX ADMIN — NICOTINE POLACRILEX 4 MG: 4 GUM, CHEWING BUCCAL at 08:54

## 2023-06-21 NOTE — PROGRESS NOTES
"Progress Note - [facility] 29 y o  male MRN: [id number]   Unit/Bed#: -02 Encounter: [id number]    Behavior over the last 24 hours: improved  Lesley Guy is a 30 yo male with PMH of bipolar, MDD, anxiety presenting for psychiatric follow up  Upon interview, he is lying in bed, calm, cooperative, and goal directed  He states that he is \"feeling much better\" and \"pretty good\" with his mood and physically as well  He feels like his medications are \"really helping  \" He denies suicidal thoughts, angry or violent thoughts, mood swings, irritability  He states that he was anxious yesterday before his phone call with ex-wife, states that Atarax calmed him down  He got to hear his daughter's voice, and this is giving him hope  He is looking forward to drug and alcohol rehab as this will be a \"fresh start\" for him  He says that this will hopefully allow him to be more present for his daughter in the future  He is tolerating medications well with no side effects  He reports sleep is improved, though he took PRN trazodone last night, still has vivid dreams  He is attending group therapy  Denies auditory or visual hallucinations      Sleep: normal  Appetite: increased  Medication side effects: No   ROS: no complaints, all other systems are negative    Mental Status Evaluation:    Appearance:  age appropriate, dressed appropriately   Behavior:  normal, pleasant, cooperative   Speech:  normal rate and volume   Mood:  \"pretty good\"   Affect:  appropriate, mood-congruent   Thought Process:  organized, goal directed, linear, slightly tangential   Associations: intact associations   Thought Content:  normal   Perceptual Disturbances: no auditory hallucinations, no visual hallucinations   Risk Potential: Suicidal ideation - None  Homicidal ideation - None  Potential for aggression - No   Sensorium:  oriented to person, place and time/date   Memory:  recent and remote memory grossly intact " Consciousness:  alert and awake   Attention/Concentration: attention span and concentration are age appropriate   Insight:  fair   Judgment: fair   Gait/Station: normal gait/station   Motor Activity: no abnormal movements     Vital signs in last 24 hours:    Temp:  [97 9 °F (36 6 °C)-98 6 °F (37 °C)] 98 6 °F (37 °C)  HR:  [64-72] 64  Resp:  [17-18] 18  BP: (121-125)/(69-70) 121/70    Laboratory results: I have personally reviewed all pertinent laboratory/tests results    Results from the past 24 hours: No results found for this or any previous visit (from the past 24 hour(s))  Most Recent Labs:   Lab Results   Component Value Date    WBC 7 02 06/18/2023    RBC 4 28 06/18/2023    HGB 13 3 06/18/2023    HCT 40 5 06/18/2023     06/18/2023    RDW 12 9 06/18/2023    NEUTROABS 2 96 06/18/2023    SODIUM 139 06/18/2023    K 3 9 06/18/2023     06/18/2023    CO2 30 06/18/2023    BUN 14 06/18/2023    CREATININE 1 05 06/18/2023    GLUC 90 06/18/2023    CALCIUM 9 2 06/18/2023    CHOLESTEROL 118 06/18/2023    HDL 45 06/18/2023    TRIG 72 06/18/2023    LDLCALC 59 06/18/2023    [place] 73 06/18/2023    JIJ2AOCKUZRY 1 552 06/18/2023       Progress Toward Goals: progressing    Assessment/Plan   Principal Problem:    Bipolar disorder ([facility] [address] )  Active Problems:    Medical clearance for psychiatric admission    Recurrent major depressive disorder (HCC)    Anxiety    Methamphetamine use (HCC)    Cannabis use disorder    PTSD (post-traumatic stress disorder)    Tobacco abuse      Recommended Treatment:     Planned medication and treatment changes: All current active medications have been reviewed  Encourage group therapy, milieu therapy and occupational therapy  Behavioral Health checks every 7 minutes    Continue all medications, patient appears to be stabilizing and has been active and visible on the unit   Discharge planning to inpatient drug and alcohol rehabilitation, case management will bed search tomorrow      Current Facility-Administered Medications   Medication Dose Route Frequency Provider Last Rate   • acetaminophen  650 mg Oral Q6H PRN KENISHA HammerNP     • acetaminophen  650 mg Oral Q4H PRN KENISHA HammerNP     • acetaminophen  975 mg Oral Q6H PRN JAM Barrera     • aluminum-magnesium hydroxide-simethicone  30 mL Oral Q4H PRN Ashley Gray, CRNP     • benztropine  1 mg Intramuscular Q4H PRN Max 6/day JAM Barrera     • benztropine  1 mg Oral Q4H PRN Max 6/day JAM Barrera     • bisacodyl  10 mg Rectal Daily PRN JAM Barrera     • citalopram  10 mg Oral Daily Angella Bermudez MD     • hydrOXYzine HCL  50 mg Oral Q6H PRN Max 4/day JAM Angelo      Or   • diphenhydrAMINE  50 mg Intramuscular Q6H PRN JAM Hammer     • glycerin-hypromellose-  1 drop Both Eyes Q3H PRN KENISHA HammerNP     • hydrOXYzine HCL  100 mg Oral Q6H PRN Max 4/day JAM Barrera      Or   • LORazepam  2 mg Intramuscular Q6H PRN JAM Barrera     • hydrOXYzine HCL  25 mg Oral Q6H PRN Max 4/day JAM Barrera     • nicotine  1 patch Transdermal Daily Angella Bermudez MD     • nicotine polacrilex  4 mg Oral Q2H PRN Alexey Leonard MD     • OLANZapine  5 mg Oral Q4H PRN Max 3/day JAM Barrera      Or   • OLANZapine  2 5 mg Intramuscular Q4H PRN Max 3/day JAM Barrera     • OLANZapine  5 mg Oral Q3H PRN Max 3/day JAM Barrera      Or   • OLANZapine  5 mg Intramuscular Q3H PRN Max 3/day JAM Barrera     • OLANZapine  2 5 mg Oral Q4H PRN Max 6/day JAM Angelo     • OLANZapine  7 5 mg Oral HS Adalberto Michelle PA-C     • polyethylene glycol  17 g Oral Daily PRN JAM Barrera     • propranolol  10 mg Oral Q8H PRN JAM Hammer     • senna-docusate sodium 1 tablet Oral Daily PRN JAM Barrera     • traZODone  50 mg Oral HS PRN JAM Oliver       Risks / Benefits of Treatment:    Risks, benefits, and possible side effects of medications explained to patient and patient verbalizes understanding and agreement for treatment  Counseling / Coordination of Care:    Patient's progress discussed with staff in treatment team meeting  Medications, treatment progress and treatment plan reviewed with patient      Luis Alfredo Flores PA-C 06/21/23

## 2023-06-21 NOTE — CASE MANAGEMENT
CM contacted [address] [facility] @ [phone] & asked if they are able to accept individuals on [employer], but was told no  CM sent an email to [address] [facility] intake dept at [email or web address], to inquire if they can accept individuals on [place]  Celina Myles initially responded that they can, but then questioned if the individual was looking to update their address to the facility's address  CM reported that they may need to, & was told that they would not be able to accept someone under those circumstances  CM sent an email to [address] [place] [address] to inquire if he was aware of what rehab facilities can accept individuals on [employer]  MERCEDES was given contact information by a co-worker for Shahana Contreras [facility] [place] [employer]  [employer] Section Phone: [phone]  CM will talk with Pt about calling to find out what he needs to do, so that he can pursue rehab  Per the [place] [employer] website, Pt is non-compliant
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
## 2023-06-21 NOTE — PROGRESS NOTES
Status: Pt in bed throughout the day, out more in the afternoon  Pt reported 10/10 anxiety after a phone call with ex & was given PRN  Pt appeared to have slept overnight  Medication: no changes / PRN - Mylanta and Atarax  D/C: Thurs - CM will begin rehab bed search  CM is unsure if [place] will be a barrier in identifying an accepting facility  06/21/23 0750   Team Meeting   Meeting Type Daily Rounds   Team Members Present   Team Members Present Physician;Nurse; Other (Discipline and Name);    Physician Team Member Dr Corey Lazcano / Dr Jennifer Dyer / Declan Barone / Jeremy Dyson Team Member Melissa Adames / RN Student   Care Management Team Member Levon Bedoya / Nina Garcia / Sasha Brady   Other (Discipline and Name) Luis(art therapy)   Patient/Family Present   Patient Present No   Patient's Family Present No

## 2023-06-21 NOTE — NURSING NOTE
"Pt reports improved mood  Pt states \"I have an address for discharge\"  Pt asked and signed a 72 hour notice 06/21/23 @3235  Denies SI or thoughts to harm self  Denies HI or hallucinations  Compliant with medication  Pt forward thinking  Pt walking the halls and attending groups  Denies any question or concern at this time    "

## 2023-06-21 NOTE — CASE MANAGEMENT
CM met with Pt to review barrier of him reporting he needs to use the rehab address to update PSP  Pt asked about going to shelter & CM reviewed that [facility] has taken individuals on [place] in the past   Pt asked if he is able to use their address, however, CM was not sure  Pt asked CM to call & they called together @ [phone]  Voicemail for Kal Chris was reached & CM left a message requesting a return call regarding some questions about the shelter  CM reviewed with Pt possibility of contact PSP to see if there is a temporary way for Pt to update his address & be permitted to attend rehab; Pt agreeable & signed ELLE  CM & Pt called Bossman Mary,  II [facility] [employer] Section @ [phone] & left a message seeking a return call  Pt said that he would like to go to rehab, but needs to get his address updated  CM reviewed that he is listed as non-compliant on the website  CM agreed to update as soon as she receives any return calls

## 2023-06-21 NOTE — BH TRANSITION RECORD
Contact Information: If you have any questions, concerns, pended studies, tests and/or procedures, or emergencies regarding your inpatient behavioral health visit  Please contact [place] [facility] [phone] and ask to speak to a , nurse or physician  A contact is available 24 hours/ 7 days a week at this number  Summary of Procedures Performed During your Stay:  Below is a list of major procedures performed during your hospital stay and a summary of results:  - No major procedures performed  Pending Studies (From admission, onward)    None        Please follow up on the above pending studies with your PCP and/or referring provider

## 2023-06-21 NOTE — PROGRESS NOTES
[facility] Group Note     06/21/23 1230   Activity/Group Checklist   Group Life Skills  (Core Beliefs/Perspectives)   Attendance Attended   Attendance Duration (min) 16-30  (left group early)   Interactions Interacted appropriately  (apologetic for inability to stay in group)   Affect/Mood Appropriate;Bright   Goals Achieved Identified feelings; Discussed coping strategies; Able to listen to others; Able to engage in interactions; Able to reflect/comment on own behavior;Able to recieve feedback; Able to give feedback to another

## 2023-06-21 NOTE — CASE MANAGEMENT
CM notified by nursing that Pt was looking to talk to her  CM met with Pt, who was resting in bed  Pt wanted to know if anyone had called back, & CM reported not yet  Pt had questions about being listed as non-compliant & CM assisted him looking on the computer at the [facility] website  Pt reported he has from January to June to update his address for the year  Pt asked if he could just be discharged? CM reviewed that the treatment team had discussed d/c tomorrow  Pt said that he spoke with his ex's family & he can use their address temporarily, until he can work things out  CM asked he wanted to call the phone number on the [facility] website to see if it can be updated over the phone with documentation of his admission provided by the hospital, so that he could still be referred to [facility]? Pt said that he will just go in person & update his address  Pt asked if he could be transported to [address] [place] [address] [address] said [address] & asked which [address]? Pt unsure but looked with CM on the computer & said [address] [place]  Pt said he would be interested in outpatient treatment too
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
## 2023-06-22 VITALS
TEMPERATURE: 98.1 F | RESPIRATION RATE: 18 BRPM | WEIGHT: 130.4 LBS | SYSTOLIC BLOOD PRESSURE: 128 MMHG | HEART RATE: 63 BPM | BODY MASS INDEX: 19.76 KG/M2 | HEIGHT: 68 IN | DIASTOLIC BLOOD PRESSURE: 69 MMHG | OXYGEN SATURATION: 99 %

## 2023-06-22 PROCEDURE — 99239 HOSP IP/OBS DSCHRG MGMT >30: CPT | Performed by: STUDENT IN AN ORGANIZED HEALTH CARE EDUCATION/TRAINING PROGRAM

## 2023-06-22 RX ORDER — CITALOPRAM HYDROBROMIDE 10 MG/1
10 TABLET ORAL DAILY
Qty: 30 TABLET | Refills: 0 | Status: SHIPPED | OUTPATIENT
Start: 2023-06-22 | End: 2023-07-22

## 2023-06-22 RX ORDER — OLANZAPINE 7.5 MG/1
7.5 TABLET ORAL
Qty: 30 TABLET | Refills: 0 | Status: SHIPPED | OUTPATIENT
Start: 2023-06-22 | End: 2023-07-22

## 2023-06-22 RX ADMIN — NICOTINE 1 PATCH: 21 PATCH, EXTENDED RELEASE TRANSDERMAL at 09:34

## 2023-06-22 RX ADMIN — CITALOPRAM HYDROBROMIDE 10 MG: 10 TABLET ORAL at 09:33

## 2023-06-22 NOTE — NURSING NOTE
Pt walking hallways, when approached stated he was happy about discharge  Pt expresses readiness to leave  Pt calm and cooperative  Denies SI/HI/AH/VH  Compliant with medication

## 2023-06-22 NOTE — NURSING NOTE
F/U to Atarax 100 mg administered at 2004 hrs for anxiety, patient reports effectiveness, states he was able to attend and enjoy group

## 2023-06-22 NOTE — PROGRESS NOTES
Status: Pt denies any SI/HI or hallucinations  In the afternoon, Pt reported increased anxiety & received PRN, which was effective & Pt attended evening group & slept overnight  Pt requested & signed a 72 hour notice on 6/21 @ 1915  Medication: no changes / PRN - Tylenol and Atarax (50mg & 100mg)  D/C: Today - via Lyft at 1015 / Pt plans to be transported directly to [address] [place] [address], so he can register/update his address  Pt reported he is able to use is ex's family's address still  CM will outreach to [facility] to confirm that after Pt has done this, he can call them himself & get into rehab if meets criteria        06/22/23 1450   Team Meeting   Meeting Type Daily Rounds   Team Members Present   Team Members Present Physician;Nurse; Other (Discipline and Name);    Physician Team Member Dr Kriss Bennett / David Munroe Team Member [id number] Management Team Member Jose A Guthrie / Juliette Friday / Immanuel Mcintosh   Other (Discipline and Name) Luis(art therapy)   Patient/Family Present   Patient Present No   Patient's Family Present No

## 2023-06-22 NOTE — DISCHARGE INSTR - OTHER ORDERS
Crisis Services Crisis is not simply the moment when things become intolerable  Crises build over time, and often can be recognized and managed in advance  [address] provides not only immediate support for crisis situations, but also assistance with managing recurring or future crises  Support is available 24 hours a day, 7 days a week at 1-547-515-HOPE ([phone])  This service is available to anyone in [facility], including children, teens, adults, and families  Stages of Crisis Management  Before a crisis…  When you start to recognize the stressors that you or a loved one have felt during previous crises, please call [facility]'s peer support talk line at [phone]  It is available, free of charge, 7 days a week, 1:00pm to 9:00pm   [facility] also has a [employer] that can be reached by calling [phone] or texting [phone]  It is available Monday through Friday, 3:00pm to 9:00pm     During a crisis…  When you or a loved one are experiencing a crisis, Mobile Crisis is available to help  Just call [phone])  The line is open 24 hours per day, 7 days per week  After a crisis…  Mobile Crisis would like to help you develop ways to help reduce future crisis situations and create a crisis plan as part of your (or your child's, or your family's) recovery and wellness goals  Text CONNECT to 758347 from anywhere in the [place], anytime, about any type of crisis  A live, trained Crisis Counselor receives the text and lets you know that they are here to listen  The volunteer Crisis Counselor will help you move from a hot moment to a cool moment  [place] AND ALCOHOL TREATMENT  Treatment services are determined by the unique needs of each person  Initial contacts may be made through any of the Outpatient Treatment Facilities or Case Management Offices    Outpatient treatment is for persons with patterns of abuse or addiction who are seeking help  Individual,  group and family counseling is available through regularly  scheduled hourly appointments  [facility]  [address] [facility], [address] [facility] [phone]  Jorge Sanchez, [address]  [place], [address] [facility] [phone]  Jorge Sanchez, [address]   [address]  [place], [address] [facility] [phone]  [facility]  [address]  [place], [address] [facility] [phone]  [facility] [place]  [address] [facility], [address] [phone]    The [address] [address] Alcohol  Information and Advocacy Service  [phone]  [address]  residents experiencing drug and  alcohol problems, or seeking help for a  family member, friend or colleague  [facility] is a free, peer-led support group for adults living with mental illness  You will gain insight from hearing the challenges and successes of others, and the groups are led by [facility]-trained facilitators who've been there  [facility]'s Support Groups are unique because they follow a structured model to ensure you and others in the group have an opportunity to be heard and to get what you need  By sharing your experiences in a safe and confidential setting, you gain hope and develop relationships  The group encourages empathy, productive discussion and a sense of community  Please note that all groups are currently held virtually, registration required: [email or web address]    When we are able to meet in-person, groups will meet at:    [facility]- 1st Monday of each month (except holidays) at 6:15 pm - 7:45 pm at the [facility], [address], [address]  [facility] - 1st Saturday of each month at 10am - 11:30am at the [facility], [address] , [place] [address]  Kashif clement- 3rd Saturday of each month at 10 am  - 11:30am at the [facility] office, [address]  [facility] - [address], [address]  Support & Referral Helpline  Support and Referral Helpline is a 24-hour, 7 days-a-week, listening and referral service provided to all of [place]  Please call [phone] or tty [phone] to speak with one of our specialists  The helpline is possible through partnerships with the [employer]

## 2023-06-22 NOTE — NURSING NOTE
F/U to Tylenol administered for # 8/10 headache, patient reports a reduction in pain sensation of 2/10

## 2023-06-22 NOTE — NURSING NOTE
Pt expressed readiness for discharge  AVS reviewed with pt  Belongings packed by [facility]  Prescriptions were printed and placed in discharge folder  Denies any question or concerns  Pt discharge from unit via lyft

## 2023-06-22 NOTE — PLAN OF CARE
Problem: SELF HARM/SUICIDALITY  Goal: Will have no self-injury during hospital stay  Description: INTERVENTIONS:  - Q 15 MINUTES: Routine safety checks  - Q WAKING SHIFT & PRN: Assess risk to determine if routine checks are adequate to maintain patient safety  - Encourage patient to participate actively in care by formulating a plan to combat response to suicidal ideation, identify supports and resources  6/22/2023 0904 by Mariama Montaño RN  Outcome: Adequate for Discharge  6/21/2023 1932 by Mariama Montaño RN  Outcome: Progressing     Problem: DEPRESSION  Goal: Will be euthymic at discharge  Description: INTERVENTIONS:  - Administer medication as ordered  - Provide emotional support via 1:1 interaction with staff  - Encourage involvement in milieu/groups/activities  - Monitor for social isolation  6/22/2023 0904 by Mariama Montaño RN  Outcome: Adequate for Discharge  6/21/2023 1932 by Mariama Montaño RN  Outcome: Progressing     Problem: ANXIETY  Goal: Will report anxiety at manageable levels  Description: INTERVENTIONS:  - Administer medication as ordered  - Teach and encourage coping skills  - Provide emotional support  - Assess patient/family for anxiety and ability to cope  6/22/2023 0904 by Mariama Montaño RN  Outcome: Adequate for Discharge  6/21/2023 1932 by Mariama Montaño RN  Outcome: Progressing  Goal: By discharge: Patient will verbalize 2 strategies to deal with anxiety  Description: Interventions:  - Identify any obvious source/trigger to anxiety  - Staff will assist patient in applying identified coping technique/skills  - Encourage attendance of scheduled groups and activities  6/22/2023 0904 by Mariama Montaño RN  Outcome: Adequate for Discharge  6/21/2023 1932 by Mariama Montaño RN  Outcome: Progressing     Problem: SUBSTANCE USE/ABUSE  Goal: Will have no detox symptoms and will verbalize plan for changing substance-related behavior  Description: INTERVENTIONS:  - Monitor physical status and assess for symptoms of withdrawal  - Administer medication as ordered  - Provide emotional support with 1 on 1 interaction with staff  - Encourage recovery focused program/ addiction education  - Assess for verbalization of changing behaviors related to substance abuse  - Initiate consults and referrals as appropriate (Case Management, Spiritual Care, etc )  6/22/2023 0904 by Marlee Villavicencio RN  Outcome: Adequate for Discharge  6/21/2023 1932 by Marlee Villavicencio RN  Outcome: Progressing  Goal: By discharge, will develop insight into their chemical dependency and sustain motivation to continue in recovery  Description: INTERVENTIONS:  - Attends all daily group sessions and scheduled AA groups  - Actively practices coping skills through participation in the therapeutic community and adherence to program rules  - Reviews and completes assignments from individual treatment plan  - Assist patient development of understanding of their personal cycle of addiction and relapse triggers  6/22/2023 0904 by Marlee Villavicencio RN  Outcome: Adequate for Discharge  6/21/2023 1932 by Marlee Villavicencio RN  Outcome: Progressing  Goal: By discharge, patient will have ongoing treatment plan addressing chemical dependency  Description: INTERVENTIONS:  - Assist patient with resources and/or appointments for ongoing recovery based living  6/22/2023 0904 by Marlee Villavicencio RN  Outcome: Adequate for Discharge  6/21/2023 1932 by Marlee Villavicencio RN  Outcome: Progressing     Problem: SLEEP DISTURBANCE  Goal: Will exhibit normal sleeping pattern  Description: Interventions:  -  Assess the patients sleep pattern, noting recent changes  - Administer medication as ordered  - Decrease environmental stimuli, including noise, as appropriate during the night  - Encourage the patient to actively participate in unit groups and or exercise during the day to enhance ability to achieve adequate sleep at night  - Assess the patient, in the morning, encouraging a description of sleep experience  6/22/2023 0904 by Meagan Ballard RN  Outcome: Adequate for Discharge  6/21/2023 1932 by Meagan Ballard RN  Outcome: Progressing     Problem: Nutrition/Hydration-ADULT  Goal: Nutrient/Hydration intake appropriate for improving, restoring or maintaining nutritional needs  Description: Monitor and assess patient's nutrition/hydration status for malnutrition  Collaborate with interdisciplinary team and initiate plan and interventions as ordered  Monitor patient's weight and dietary intake as ordered or per policy  Utilize nutrition screening tool and intervene as necessary  Determine patient's food preferences and provide high-protein, high-caloric foods as appropriate       INTERVENTIONS:  - Monitor oral intake, urinary output, labs, and treatment plans  - Assess nutrition and hydration status and recommend course of action  - Evaluate amount of meals eaten  - Assist patient with eating if necessary   - Allow adequate time for meals  - Recommend/ encourage appropriate diets, oral nutritional supplements, and vitamin/mineral supplements  - Order, calculate, and assess calorie counts as needed  - Recommend, monitor, and adjust tube feedings and TPN/PPN based on assessed needs  - Assess need for intravenous fluids  - Provide specific nutrition/hydration education as appropriate  - Include patient/family/caregiver in decisions related to nutrition  6/22/2023 0904 by Meagan Ballard RN  Outcome: Adequate for Discharge  6/21/2023 1932 by Meagan Ballard RN  Outcome: Progressing     Problem: DISCHARGE PLANNING  Goal: Discharge to home or other facility with appropriate resources  Description: INTERVENTIONS:  - Identify barriers to discharge w/patient and caregiver  - Arrange for needed discharge resources and transportation as appropriate  - Identify discharge learning needs (meds, wound care, etc )  - Arrange for interpretive services to assist at discharge as needed  - Refer to Case Management Department for coordinating discharge planning if the patient needs post-hospital services based on physician/advanced practitioner order or complex needs related to functional status, cognitive ability, or social support system  Outcome: Adequate for Discharge     Problem: Ineffective Coping  Goal: Participates in unit activities  Description: Interventions:  - Provide therapeutic environment   - Provide required programming   - Redirect inappropriate behaviors   Outcome: Adequate for Discharge

## 2023-06-22 NOTE — CASE MANAGEMENT
CM emailed Reji Porras at [facility] to inquire if once Pt updates his address/registers with [facility], if he can call intake & self refer to rehab & she said absolutely  CM met with Pt who verbalized readiness for discharge  CM reviewed the above & Pt said that he would like rehab & plans to do that  Pt would still like transportation directly to [address] [place] [address] & then he said he will get a ride back to the ex's house  Pt said that he is able to stay there temporarily & he will be able to use a phone at the house to call [facility]  CM spoke to Pt about  services & he was in agreement; referral form & ELLE signed by Pt  Pt read & signed [employer] waiver  Pt read & signed IMM  He had no questions about his d/c plans  MERCEDES electronically booked [employer] for 1015 for Pt

## 2023-06-22 NOTE — DISCHARGE INSTR - APPOINTMENTS
Stephan De La Torre or Emma, our Maral and Orly, will be calling you after your discharge, on the phone number that you provided  They will be available as an additional support, if needed  If you wish to speak with one of them, you may contact Siria Mckee at [phone] or Ida Jade at [phone]  You are being discharged to [address] [place] [address] located at [address] [facility]    You have no phone number to be contacted by, but did provide email address [email or web address]

## 2024-08-26 ENCOUNTER — HOSPITAL ENCOUNTER (EMERGENCY)
Facility: HOSPITAL | Age: 34
Discharge: HOME | End: 2024-08-26
Attending: EMERGENCY MEDICINE

## 2024-08-26 VITALS
BODY MASS INDEX: 20.76 KG/M2 | RESPIRATION RATE: 18 BRPM | HEART RATE: 74 BPM | DIASTOLIC BLOOD PRESSURE: 80 MMHG | OXYGEN SATURATION: 99 % | HEIGHT: 68 IN | WEIGHT: 137 LBS | SYSTOLIC BLOOD PRESSURE: 150 MMHG | TEMPERATURE: 98 F

## 2024-08-26 DIAGNOSIS — Z76.0 MEDICATION REFILL: ICD-10-CM

## 2024-08-26 DIAGNOSIS — F31.9 BIPOLAR DISORDER (HCC): Primary | ICD-10-CM

## 2024-08-26 PROCEDURE — 99282 EMERGENCY DEPT VISIT SF MDM: CPT

## 2024-08-26 PROCEDURE — 99284 EMERGENCY DEPT VISIT MOD MDM: CPT | Performed by: EMERGENCY MEDICINE

## 2024-08-26 RX ORDER — CITALOPRAM HYDROBROMIDE 10 MG/1
10 TABLET ORAL DAILY
Qty: 30 TABLET | Refills: 2 | Status: SHIPPED | OUTPATIENT
Start: 2024-08-26

## 2024-08-26 RX ORDER — OLANZAPINE 7.5 MG/1
7.5 TABLET, FILM COATED ORAL
Qty: 30 TABLET | Refills: 2 | Status: SHIPPED | OUTPATIENT
Start: 2024-08-26

## 2024-08-26 NOTE — ED PROVIDER NOTES
History  Chief Complaint   Patient presents with    Medication Refill     Pt to ED for refill of zyprexa. States he was in [facility] and has not been able to get a refill of it since he was released.  States he has become very angry and almost aggressive and needs to be stabilized and function.      Patient is a 34-year-old male.  He was recently incarcerated.  He has past medical history of bipolar disorder.  He has depression and anxiety.  He has a history of PTSD.  There is a history of substance abuse.  Patient was on Celexa 10 mg daily and Zyprexa 7.5 mg at bedtime.  He ran out.  He is requesting refills.  He denies being suicidal or homicidal.  No hallucinations.        Prior to Admission Medications   Prescriptions Last Dose Informant Patient Reported? Taking?   OLANZapine (ZyPREXA) 7.5 mg tablet   No No   Sig: Take 1 tablet (7.5 mg total) by mouth daily at bedtime   citalopram (CeleXA) 10 mg tablet   No No   Sig: Take 1 tablet (10 mg total) by mouth daily      Facility-Administered Medications: None       Past Medical History:   Diagnosis Date    Bipolar 1 disorder (HCC)     Manic depression (HCC)        History reviewed. No pertinent surgical history.    History reviewed. No pertinent family history.  I have reviewed and agree with the history as documented.    E-Cigarette/Vaping    E-Cigarette Use Former User      E-Cigarette/Vaping Substances    Nicotine No     THC Yes     CBD No     Flavoring No     Other No     Unknown No      Social History     Tobacco Use    Smoking status: Every Day     Current packs/day: 0.50     Types: Cigarettes    Smokeless tobacco: Never   Vaping Use    Vaping status: Former    Substances: THC   Substance Use Topics    Alcohol use: Yes     Alcohol/week: 1.0 standard drink of alcohol     Types: 1 Cans of beer per week     Comment: social drinking 1-2 beers    Drug use: Not Currently     Types: Methamphetamines, Marijuana     Comment: last use july 2024       Review of Systems    Psychiatric/Behavioral:  Negative for hallucinations and suicidal ideas.    All other systems reviewed and are negative.      Physical Exam  Physical Exam  Vitals reviewed.   Constitutional:       General: He is not in acute distress.     Appearance: He is not toxic-appearing.   HENT:      Head: Normocephalic and atraumatic.      Nose: Nose normal.      Mouth/Throat:      Mouth: Mucous membranes are moist.   Eyes:      General:         Right eye: No discharge.         Left eye: No discharge.      Conjunctiva/sclera: Conjunctivae normal.   Cardiovascular:      Rate and Rhythm: Normal rate and regular rhythm.      Pulses: Normal pulses.      Heart sounds: Normal heart sounds. No murmur heard.     No friction rub. No gallop.   Pulmonary:      Effort: Pulmonary effort is normal. No respiratory distress.      Breath sounds: Normal breath sounds. No stridor. No wheezing, rhonchi or rales.   Abdominal:      General: Bowel sounds are normal. There is no distension.      Palpations: Abdomen is soft.      Tenderness: There is no abdominal tenderness. There is no right CVA tenderness, left CVA tenderness, guarding or rebound.   Musculoskeletal:         General: No swelling, tenderness, deformity or signs of injury. Normal range of motion.      Cervical back: Normal range of motion and neck supple. No rigidity.      Right lower leg: No edema.      Left lower leg: No edema.      Comments: No calf pain or unilateral leg swelling   Skin:     General: Skin is warm and dry.      Coloration: Skin is not jaundiced or pale.      Findings: No bruising, erythema or rash.   Neurological:      General: No focal deficit present.      Mental Status: He is alert and oriented to person, place, and time.      Cranial Nerves: No facial asymmetry.      Sensory: No sensory deficit.      Motor: Motor function is intact.   Psychiatric:         Mood and Affect: Mood normal.         Behavior: Behavior normal.         Vital Signs  ED Triage Vitals  [08/26/24 1654]   Temperature Pulse Respirations Blood Pressure SpO2   98 °F (36.7 °C) 74 18 150/80 99 %      Temp src Heart Rate Source Patient Position - Orthostatic VS BP Location FiO2 (%)   -- Monitor Sitting Left arm --      Pain Score       No Pain           Vitals:    08/26/24 1654   BP: 150/80   Pulse: 74   Patient Position - Orthostatic VS: Sitting         Visual Acuity      ED Medications  Medications - No data to display    Diagnostic Studies  Results Reviewed       None                   No orders to display              Procedures  Procedures         ED Course                                 SBIRT 22yo+      Flowsheet Row Most Recent Value   Initial Alcohol Screen: US AUDIT-C     1. How often do you have a drink containing alcohol? 0 Filed at: 08/26/2024 1658   2. How many drinks containing alcohol do you have on a typical day you are drinking?  0 Filed at: 08/26/2024 1658   3a. Male UNDER 65: How often do you have five or more drinks on one occasion? 0 Filed at: 08/26/2024 1658   3b. FEMALE Any Age, or MALE 65+: How often do you have 4 or more drinks on one occassion? 0 Filed at: 08/26/2024 1658   Audit-C Score 0 Filed at: 08/26/2024 1658   EZRA: How many times in the past year have you...    Used an illegal drug or used a prescription medication for non-medical reasons? Never Filed at: 08/26/2024 1658                      Medical Decision Making  Not suicidal or homicidal.  Not psychotic.  No grounds for admission.  Refill meds and give outpatient referrals.    Risk  Prescription drug management.  Decision regarding hospitalization.                 Disposition  Final diagnoses:   Bipolar disorder (HCC)   Medication refill     Time reflects when diagnosis was documented in both MDM as applicable and the Disposition within this note       Time User Action Codes Description Comment    8/26/2024  5:51 PM Catarino Goncalves Add [F31.9] Bipolar disorder (HCC)     8/26/2024  5:51 PM Catarino Goncalves Add  [Z76.0] Medication refill           ED Disposition       ED Disposition   Discharge    Condition   Stable    Date/Time   Mon Aug 26, 2024 1753    Comment   Raudel Bland discharge to home/self care.                   Follow-up Information       Follow up With Specialties Details Why Contact Info Additional Information    [facility] In 1 week  [address] [facility] [address]  [facility] [address] [phone] [facility], [address] [place] [address], [place], [place], [address] [phone]            Patient's Medications   Discharge Prescriptions    CITALOPRAM (CELEXA) 10 MG TABLET    Take 1 tablet (10 mg total) by mouth daily       Start Date: 8/26/2024 End Date: --       Order Dose: 10 mg       Quantity: 30 tablet    Refills: 2    OLANZAPINE (ZYPREXA) 7.5 MG TABLET    Take 1 tablet (7.5 mg total) by mouth daily at bedtime       Start Date: 8/26/2024 End Date: --       Order Dose: 7.5 mg       Quantity: 30 tablet    Refills: 2       No discharge procedures on file.    PDMP Review       None            ED Provider  Electronically Signed by             Catarino Goncalves MD  08/26/24 3329

## 2024-10-16 ENCOUNTER — HOSPITAL ENCOUNTER (EMERGENCY)
Facility: HOSPITAL | Age: 34
End: 2024-10-16
Attending: EMERGENCY MEDICINE

## 2024-10-16 ENCOUNTER — HOSPITAL ENCOUNTER (INPATIENT)
Facility: HOSPITAL | Age: 34
LOS: 12 days | Discharge: HOME | DRG: 885 | End: 2024-10-28
Attending: STUDENT IN AN ORGANIZED HEALTH CARE EDUCATION/TRAINING PROGRAM | Admitting: PSYCHIATRY & NEUROLOGY
Payer: MEDICARE

## 2024-10-16 VITALS
OXYGEN SATURATION: 95 % | HEART RATE: 65 BPM | RESPIRATION RATE: 18 BRPM | SYSTOLIC BLOOD PRESSURE: 118 MMHG | TEMPERATURE: 98.1 F | DIASTOLIC BLOOD PRESSURE: 59 MMHG

## 2024-10-16 DIAGNOSIS — Z72.0 TOBACCO ABUSE: ICD-10-CM

## 2024-10-16 DIAGNOSIS — Z72.0 TOBACCO ABUSE: Primary | ICD-10-CM

## 2024-10-16 DIAGNOSIS — F32.A DEPRESSION: ICD-10-CM

## 2024-10-16 DIAGNOSIS — Z00.8 MEDICAL CLEARANCE FOR PSYCHIATRIC ADMISSION: ICD-10-CM

## 2024-10-16 DIAGNOSIS — F31.9 BIPOLAR DISORDER (HCC): Primary | Chronic | ICD-10-CM

## 2024-10-16 LAB
AMPHETAMINES SERPL QL SCN: NEGATIVE
BARBITURATES UR QL: NEGATIVE
BENZODIAZ UR QL: NEGATIVE
COCAINE UR QL: NEGATIVE
ETHANOL EXG-MCNC: 0 MG/DL
FENTANYL UR QL SCN: NEGATIVE
HYDROCODONE UR QL SCN: NEGATIVE
METHADONE UR QL: NEGATIVE
OPIATES UR QL SCN: NEGATIVE
OXYCODONE+OXYMORPHONE UR QL SCN: NEGATIVE
PCP UR QL: NEGATIVE
THC UR QL: POSITIVE

## 2024-10-16 PROCEDURE — 99285 EMERGENCY DEPT VISIT HI MDM: CPT

## 2024-10-16 PROCEDURE — 82075 ASSAY OF BREATH ETHANOL: CPT

## 2024-10-16 PROCEDURE — 80307 DRUG TEST PRSMV CHEM ANLYZR: CPT

## 2024-10-16 RX ORDER — PROPRANOLOL HYDROCHLORIDE 10 MG/1
10 TABLET ORAL EVERY 8 HOURS PRN
Status: DISCONTINUED | OUTPATIENT
Start: 2024-10-16 | End: 2024-10-28 | Stop reason: HOSPADM

## 2024-10-16 RX ORDER — HYDROXYZINE HYDROCHLORIDE 25 MG/1
100 TABLET, FILM COATED ORAL
Status: CANCELLED | OUTPATIENT
Start: 2024-10-16

## 2024-10-16 RX ORDER — BENZTROPINE MESYLATE 1 MG/ML
1 INJECTION, SOLUTION INTRAMUSCULAR; INTRAVENOUS
Status: DISCONTINUED | OUTPATIENT
Start: 2024-10-16 | End: 2024-10-28 | Stop reason: HOSPADM

## 2024-10-16 RX ORDER — LORAZEPAM 2 MG/ML
1 INJECTION INTRAMUSCULAR
Status: CANCELLED | OUTPATIENT
Start: 2024-10-16

## 2024-10-16 RX ORDER — LORAZEPAM 2 MG/ML
2 INJECTION INTRAMUSCULAR EVERY 6 HOURS PRN
Status: DISCONTINUED | OUTPATIENT
Start: 2024-10-16 | End: 2024-10-28 | Stop reason: HOSPADM

## 2024-10-16 RX ORDER — HYDROXYZINE HYDROCHLORIDE 25 MG/1
25 TABLET, FILM COATED ORAL
Status: DISCONTINUED | OUTPATIENT
Start: 2024-10-16 | End: 2024-10-28 | Stop reason: HOSPADM

## 2024-10-16 RX ORDER — IBUPROFEN 400 MG/1
400 TABLET, FILM COATED ORAL EVERY 4 HOURS PRN
Status: CANCELLED | OUTPATIENT
Start: 2024-10-16

## 2024-10-16 RX ORDER — DIPHENHYDRAMINE HYDROCHLORIDE 50 MG/ML
50 INJECTION INTRAMUSCULAR; INTRAVENOUS EVERY 6 HOURS PRN
Status: CANCELLED | OUTPATIENT
Start: 2024-10-16

## 2024-10-16 RX ORDER — IBUPROFEN 600 MG/1
600 TABLET, FILM COATED ORAL EVERY 6 HOURS PRN
Status: CANCELLED | OUTPATIENT
Start: 2024-10-16

## 2024-10-16 RX ORDER — MAGNESIUM HYDROXIDE/ALUMINUM HYDROXICE/SIMETHICONE 120; 1200; 1200 MG/30ML; MG/30ML; MG/30ML
30 SUSPENSION ORAL EVERY 4 HOURS PRN
Status: DISCONTINUED | OUTPATIENT
Start: 2024-10-16 | End: 2024-10-28 | Stop reason: HOSPADM

## 2024-10-16 RX ORDER — HYDROXYZINE HYDROCHLORIDE 25 MG/1
25 TABLET, FILM COATED ORAL
Status: CANCELLED | OUTPATIENT
Start: 2024-10-16

## 2024-10-16 RX ORDER — LORAZEPAM 2 MG/ML
2 INJECTION INTRAMUSCULAR EVERY 6 HOURS PRN
Status: CANCELLED | OUTPATIENT
Start: 2024-10-16

## 2024-10-16 RX ORDER — HALOPERIDOL 5 MG/ML
2.5 INJECTION INTRAMUSCULAR
Status: DISCONTINUED | OUTPATIENT
Start: 2024-10-16 | End: 2024-10-28 | Stop reason: HOSPADM

## 2024-10-16 RX ORDER — TRAZODONE HYDROCHLORIDE 50 MG/1
50 TABLET, FILM COATED ORAL
Status: CANCELLED | OUTPATIENT
Start: 2024-10-16

## 2024-10-16 RX ORDER — BENZTROPINE MESYLATE 0.5 MG/1
1 TABLET ORAL
Status: CANCELLED | OUTPATIENT
Start: 2024-10-16

## 2024-10-16 RX ORDER — IBUPROFEN 400 MG/1
800 TABLET, FILM COATED ORAL EVERY 8 HOURS PRN
Status: CANCELLED | OUTPATIENT
Start: 2024-10-16

## 2024-10-16 RX ORDER — HYDROXYZINE HYDROCHLORIDE 50 MG/1
50 TABLET, FILM COATED ORAL
Status: DISCONTINUED | OUTPATIENT
Start: 2024-10-16 | End: 2024-10-28 | Stop reason: HOSPADM

## 2024-10-16 RX ORDER — BISACODYL 10 MG
10 SUPPOSITORY, RECTAL RECTAL DAILY PRN
Status: DISCONTINUED | OUTPATIENT
Start: 2024-10-16 | End: 2024-10-28 | Stop reason: HOSPADM

## 2024-10-16 RX ORDER — HALOPERIDOL 5 MG/1
2.5 TABLET ORAL
Status: DISCONTINUED | OUTPATIENT
Start: 2024-10-16 | End: 2024-10-28 | Stop reason: HOSPADM

## 2024-10-16 RX ORDER — BENZTROPINE MESYLATE 1 MG/ML
0.5 INJECTION, SOLUTION INTRAMUSCULAR; INTRAVENOUS
Status: DISCONTINUED | OUTPATIENT
Start: 2024-10-16 | End: 2024-10-28 | Stop reason: HOSPADM

## 2024-10-16 RX ORDER — AMOXICILLIN 250 MG
1 CAPSULE ORAL DAILY PRN
Status: DISCONTINUED | OUTPATIENT
Start: 2024-10-16 | End: 2024-10-28 | Stop reason: HOSPADM

## 2024-10-16 RX ORDER — HALOPERIDOL 5 MG/ML
2.5 INJECTION INTRAMUSCULAR
Status: CANCELLED | OUTPATIENT
Start: 2024-10-16

## 2024-10-16 RX ORDER — LANOLIN ALCOHOL/MO/W.PET/CERES
3 CREAM (GRAM) TOPICAL
Status: DISCONTINUED | OUTPATIENT
Start: 2024-10-16 | End: 2024-10-17

## 2024-10-16 RX ORDER — HYDROXYZINE HYDROCHLORIDE 50 MG/1
100 TABLET, FILM COATED ORAL
Status: DISCONTINUED | OUTPATIENT
Start: 2024-10-16 | End: 2024-10-28 | Stop reason: HOSPADM

## 2024-10-16 RX ORDER — HALOPERIDOL 2 MG/1
1 TABLET ORAL EVERY 6 HOURS PRN
Status: CANCELLED | OUTPATIENT
Start: 2024-10-16

## 2024-10-16 RX ORDER — TRAZODONE HYDROCHLORIDE 50 MG/1
50 TABLET, FILM COATED ORAL
Status: DISCONTINUED | OUTPATIENT
Start: 2024-10-16 | End: 2024-10-28 | Stop reason: HOSPADM

## 2024-10-16 RX ORDER — POLYETHYLENE GLYCOL 3350 17 G/17G
17 POWDER, FOR SOLUTION ORAL DAILY PRN
Status: DISCONTINUED | OUTPATIENT
Start: 2024-10-16 | End: 2024-10-28 | Stop reason: HOSPADM

## 2024-10-16 RX ORDER — BENZTROPINE MESYLATE 1 MG/ML
1 INJECTION, SOLUTION INTRAMUSCULAR; INTRAVENOUS
Status: CANCELLED | OUTPATIENT
Start: 2024-10-16

## 2024-10-16 RX ORDER — IBUPROFEN 600 MG/1
600 TABLET, FILM COATED ORAL EVERY 6 HOURS PRN
Status: DISCONTINUED | OUTPATIENT
Start: 2024-10-16 | End: 2024-10-28 | Stop reason: HOSPADM

## 2024-10-16 RX ORDER — LORAZEPAM 2 MG/ML
2 INJECTION INTRAMUSCULAR
Status: DISCONTINUED | OUTPATIENT
Start: 2024-10-16 | End: 2024-10-28 | Stop reason: HOSPADM

## 2024-10-16 RX ORDER — BISACODYL 10 MG
10 SUPPOSITORY, RECTAL RECTAL DAILY PRN
Status: CANCELLED | OUTPATIENT
Start: 2024-10-16

## 2024-10-16 RX ORDER — IBUPROFEN 800 MG/1
800 TABLET, FILM COATED ORAL EVERY 8 HOURS PRN
Status: DISCONTINUED | OUTPATIENT
Start: 2024-10-16 | End: 2024-10-28 | Stop reason: HOSPADM

## 2024-10-16 RX ORDER — HALOPERIDOL 5 MG/1
5 TABLET ORAL
Status: DISCONTINUED | OUTPATIENT
Start: 2024-10-16 | End: 2024-10-28 | Stop reason: HOSPADM

## 2024-10-16 RX ORDER — BENZTROPINE MESYLATE 1 MG/1
1 TABLET ORAL
Status: DISCONTINUED | OUTPATIENT
Start: 2024-10-16 | End: 2024-10-28 | Stop reason: HOSPADM

## 2024-10-16 RX ORDER — HALOPERIDOL 5 MG/ML
5 INJECTION INTRAMUSCULAR
Status: DISCONTINUED | OUTPATIENT
Start: 2024-10-16 | End: 2024-10-28 | Stop reason: HOSPADM

## 2024-10-16 RX ORDER — BENZTROPINE MESYLATE 1 MG/ML
0.5 INJECTION, SOLUTION INTRAMUSCULAR; INTRAVENOUS
Status: CANCELLED | OUTPATIENT
Start: 2024-10-16

## 2024-10-16 RX ORDER — DIPHENHYDRAMINE HYDROCHLORIDE 50 MG/ML
50 INJECTION INTRAMUSCULAR; INTRAVENOUS EVERY 6 HOURS PRN
Status: DISCONTINUED | OUTPATIENT
Start: 2024-10-16 | End: 2024-10-28 | Stop reason: HOSPADM

## 2024-10-16 RX ORDER — IBUPROFEN 400 MG/1
400 TABLET, FILM COATED ORAL EVERY 4 HOURS PRN
Status: DISCONTINUED | OUTPATIENT
Start: 2024-10-16 | End: 2024-10-28 | Stop reason: HOSPADM

## 2024-10-16 RX ORDER — HALOPERIDOL 5 MG/ML
5 INJECTION INTRAMUSCULAR
Status: CANCELLED | OUTPATIENT
Start: 2024-10-16

## 2024-10-16 RX ORDER — LORAZEPAM 2 MG/ML
2 INJECTION INTRAMUSCULAR
Status: CANCELLED | OUTPATIENT
Start: 2024-10-16

## 2024-10-16 RX ORDER — LANOLIN ALCOHOL/MO/W.PET/CERES
3 CREAM (GRAM) TOPICAL
Status: CANCELLED | OUTPATIENT
Start: 2024-10-16

## 2024-10-16 RX ORDER — PROPRANOLOL HCL 20 MG
10 TABLET ORAL EVERY 8 HOURS PRN
Status: CANCELLED | OUTPATIENT
Start: 2024-10-16

## 2024-10-16 RX ORDER — MAGNESIUM HYDROXIDE/ALUMINUM HYDROXICE/SIMETHICONE 120; 1200; 1200 MG/30ML; MG/30ML; MG/30ML
30 SUSPENSION ORAL EVERY 4 HOURS PRN
Status: CANCELLED | OUTPATIENT
Start: 2024-10-16

## 2024-10-16 RX ORDER — HYDROXYZINE HYDROCHLORIDE 25 MG/1
50 TABLET, FILM COATED ORAL
Status: CANCELLED | OUTPATIENT
Start: 2024-10-16

## 2024-10-16 RX ORDER — AMOXICILLIN 250 MG
1 CAPSULE ORAL DAILY PRN
Status: CANCELLED | OUTPATIENT
Start: 2024-10-16

## 2024-10-16 RX ORDER — LORAZEPAM 2 MG/ML
1 INJECTION INTRAMUSCULAR
Status: DISCONTINUED | OUTPATIENT
Start: 2024-10-16 | End: 2024-10-28 | Stop reason: HOSPADM

## 2024-10-16 RX ORDER — HALOPERIDOL 1 MG/1
1 TABLET ORAL EVERY 6 HOURS PRN
Status: DISCONTINUED | OUTPATIENT
Start: 2024-10-16 | End: 2024-10-28 | Stop reason: HOSPADM

## 2024-10-16 RX ORDER — POLYETHYLENE GLYCOL 3350 17 G/17G
17 POWDER, FOR SOLUTION ORAL DAILY PRN
Status: CANCELLED | OUTPATIENT
Start: 2024-10-16

## 2024-10-16 RX ORDER — HALOPERIDOL 2 MG/1
5 TABLET ORAL
Status: CANCELLED | OUTPATIENT
Start: 2024-10-16

## 2024-10-16 RX ADMIN — IBUPROFEN 600 MG: 600 TABLET, FILM COATED ORAL at 23:03

## 2024-10-16 RX ADMIN — Medication 3 MG: at 23:04

## 2024-10-16 NOTE — ED NOTES
Patient is accepted at [facility]  Patient is accepted by Dr. Meli Paz.     Transportation is arranged with [employer].     Transportation is scheduled for [address] [facility].   Patient may go to the floor at 2200.          Nurse report is to be called to [phone] prior to patient transfer.

## 2024-10-16 NOTE — ED NOTES
34 y.o male patient presented to the ED with a history   of bipolar, depression, ADHD, ODD and PTSD.  Pt reported he has been off his medications for about 2 months due to not being able afford the medications.  Pt stated he and his family has noticed him having more manic behaviors and becoming easily frustrated.  Pt had manic behaviors during the assessment of rapid speech.  Pt has been depressed due to the stressors in his life of not being able to find employment, financial instability, and marital issues.  Pt denies any SI but does feel hopeless at times when he is turned down for employment and feels he is letting his family down.  Pt has had previous SI in the past to overdose. Pt denies HI and AVH. Pt denies any legal issues.  Pt has past substance abuse issues.  Pt has past inpatient hospitalization in 2023. Pt has no outpatient Psychiatry and Therapy.  Pt is willing to sign a 201 for inpatient mental health treatment. Provider is in agreement with this treatment plan.

## 2024-10-16 NOTE — ED NOTES
201 was signed by the patient and the provider.  Rights and Restrictions where explained.    Pt would like to be referred to [facility].

## 2024-10-16 NOTE — ED PROVIDER NOTES
Time reflects when diagnosis was documented in both MDM as applicable and the Disposition within this note       Time User Action Codes Description Comment    10/16/2024  5:23 PM Cathy Garrison Add [Z72.0] Tobacco abuse           ED Disposition       ED Disposition   Transfer to Behavioral Health Condition   --    Date/Time   Wed Oct 16, 2024  4:19 PM    Comment   Raudel Bland should be transferred out to [facility] and has been medically cleared.                Assessment & Plan       Medical Decision Making  Patient no acute distress. Seeking psychiatric evaluation for inpatient. All necessary precautions are being taken to ensure patient safety during this period.    The patient was evaluated by the crisis worker, patient signed 201. Medically cleared.    The patient will be transported to [facility] for continued evaluation and treatment. Accepting physician Dr. Garrison. EMTALA completed, signed and handed to crisis worker.     Amount and/or Complexity of Data Reviewed  Labs: ordered.    Risk  Decision regarding hospitalization.        ED Course as of 10/17/24 1532   Wed Oct 16, 2024   1736 Patient accepted to [facility].  Accepting physician, Dr. Garrison, 10p       Medications - No data to display    ED Risk Strat Scores                           SBIRT 20yo+      Flowsheet Row Most Recent Value   Initial Alcohol Screen: US AUDIT-C     1. How often do you have a drink containing alcohol? 0 Filed at: 10/16/2024 1158   2. How many drinks containing alcohol do you have on a typical day you are drinking?  0 Filed at: 10/16/2024 1158   3a. Male UNDER 65: How often do you have five or more drinks on one occasion? 0 Filed at: 10/16/2024 1158   3b. FEMALE Any Age, or MALE 65+: How often do you have 4 or more drinks on one occassion? 0 Filed at: 10/16/2024 1158   Audit-C Score 0 Filed at: 10/16/2024 1158   EZRA: How many times in the past year have you...    Used an illegal drug or used a  "prescription medication for non-medical reasons? Never Filed at: 10/16/2024 1158                            History of Present Illness       Chief Complaint   Patient presents with    Aggressive Behavior     Pt reports aggressive and mood swings., effecting family       Past Medical History:   Diagnosis Date    Bipolar 1 disorder (HCC)     Manic depression (HCC)       History reviewed. No pertinent surgical history.   History reviewed. No pertinent family history.   Social History     Tobacco Use    Smoking status: Every Day     Current packs/day: 0.50     Types: Cigarettes    Smokeless tobacco: Never   Vaping Use    Vaping status: Former    Substances: THC   Substance Use Topics    Alcohol use: Yes     Alcohol/week: 1.0 standard drink of alcohol     Types: 1 Cans of beer per week     Comment: social drinking 1-2 beers    Drug use: Not Currently     Types: Methamphetamines, Marijuana     Comment: last use july 2024      E-Cigarette/Vaping    E-Cigarette Use Former User       E-Cigarette/Vaping Substances    Nicotine No     THC Yes     CBD No     Flavoring No     Other No     Unknown No       I have reviewed and agree with the history as documented.     The patient is a 34-year-old male with a documented medical history of depression, ADHD, ODD, PTSD, substance use and borderline schizophrenia, as noted in the chart.  He presented to the ED with a chief complaint of aggressive behavior.  The patient reports that he has been off his medications for several months, which has led to increased irritability and anger. He explains that he has been unable to access his medications due to financial constraints. He is seeking assistance today with the goal of restarting his medications and going back to inpatient. He states he has thoughts of \"giving up, but nothing violent.\" Pt has had previous SI in the past to overdose and has past inpatient hospitalization in 2023. He reports he does not have any thoughts of hurting " himself, others, or have AH, VH.          Review of Systems   Psychiatric/Behavioral:  Positive for agitation, behavioral problems and dysphoric mood. Negative for hallucinations, self-injury and suicidal ideas.    All other systems reviewed and are negative.          Objective       ED Triage Vitals [10/16/24 1159]   Temperature Pulse Blood Pressure Respirations SpO2 Patient Position - Orthostatic VS   98.1 °F (36.7 °C) 84 115/76 18 98 % Sitting      Temp Source Heart Rate Source BP Location FiO2 (%) Pain Score    Temporal Monitor Right arm -- No Pain      Vitals      Date and Time Temp Pulse SpO2 Resp BP Pain Score FACES Pain Rating User   10/16/24 1957 -- 65 95 % -- 118/59 -- -- BA   10/16/24 1159 98.1 °F (36.7 °C) 84 98 % 18 115/76 No Pain -- CM            Physical Exam  Vitals and nursing note reviewed.   Constitutional:       General: He is not in acute distress.     Appearance: Normal appearance. He is well-developed. He is not ill-appearing.   HENT:      Head: Normocephalic and atraumatic.   Eyes:      Conjunctiva/sclera: Conjunctivae normal.   Cardiovascular:      Rate and Rhythm: Normal rate and regular rhythm.   Pulmonary:      Effort: Pulmonary effort is normal. No respiratory distress.   Musculoskeletal:      Cervical back: Neck supple.   Skin:     General: Skin is warm.   Neurological:      Mental Status: He is alert and oriented to person, place, and time. Mental status is at baseline.      GCS: GCS eye subscore is 4. GCS verbal subscore is 5. GCS motor subscore is 6.   Psychiatric:         Attention and Perception: Attention normal.         Mood and Affect: Mood is depressed.         Speech: Speech is rapid and pressured.         Behavior: Behavior normal.         Thought Content: Thought content normal.         Judgment: Judgment normal.         Results Reviewed       Procedure Component Value Units Date/Time    Rapid drug screen, urine [[id number]]  (Abnormal) Collected: 10/16/24 1969    Lab Status:  Final result Specimen: Urine, Clean Catch Updated: 10/16/24 1616     Amph/Meth UR Negative     Barbiturate Ur Negative     Benzodiazepine Urine Negative     Cocaine Urine Negative     Methadone Urine Negative     Opiate Urine Negative     PCP Ur Negative     THC Urine Positive     Oxycodone Urine Negative     Fentanyl Urine Negative     HYDROCODONE URINE Negative    Narrative:      Presumptive report. If requested, specimen will be sent to reference lab for confirmation.  FOR MEDICAL PURPOSES ONLY.   IF CONFIRMATION NEEDED PLEASE CONTACT THE LAB WITHIN 5 DAYS.    Drug Screen Cutoff Levels:  AMPHETAMINE/METHAMPHETAMINES  1000 ng/mL  BARBITURATES     200 ng/mL  BENZODIAZEPINES     200 ng/mL  COCAINE      300 ng/mL  METHADONE      300 ng/mL  OPIATES      300 ng/mL  PHENCYCLIDINE     25 ng/mL  THC       50 ng/mL  OXYCODONE      100 ng/mL  FENTANYL      5 ng/mL  HYDROCODONE     300 ng/mL    POCT alcohol breath test [[id number]]  (Normal) Resulted: 10/16/24 1551    Lab Status: Final result Updated: 10/16/24 1551     EXTBreath Alcohol 0.000            No orders to display       Procedures    ED Medication and Procedure Management   Prior to Admission Medications   Prescriptions Last Dose Informant Patient Reported? Taking?   OLANZapine (ZyPREXA) 7.5 mg tablet Not Taking  No No   Sig: Take 1 tablet (7.5 mg total) by mouth daily at bedtime   Patient not taking: Reported on 10/16/2024   citalopram (CeleXA) 10 mg tablet Not Taking  No No   Sig: Take 1 tablet (10 mg total) by mouth daily   Patient not taking: Reported on 10/16/2024      Facility-Administered Medications: None     Discharge Medication List as of 10/16/2024 10:33 PM        CONTINUE these medications which have NOT CHANGED    Details   citalopram (CeleXA) 10 mg tablet Take 1 tablet (10 mg total) by mouth daily, Starting Mon 8/26/2024, Normal      OLANZapine (ZyPREXA) 7.5 mg tablet Take 1 tablet (7.5 mg total) by mouth daily at bedtime, Starting Mon 8/26/2024,  Normal           No discharge procedures on file.  ED SEPSIS DOCUMENTATION   Time reflects when diagnosis was documented in both MDM as applicable and the Disposition within this note       Time User Action Codes Description Comment    10/16/2024  5:23 PM Cathy Garrison Add [Z72.0] Tobacco abuse                  Lynn Robbins PA-C  10/17/24 1532

## 2024-10-17 PROBLEM — F31.9 BIPOLAR DISORDER (HCC): Chronic | Status: ACTIVE | Noted: 2023-06-18

## 2024-10-17 LAB
25(OH)D3 SERPL-MCNC: 22.4 NG/ML (ref 30–100)
ALBUMIN SERPL BCG-MCNC: 4.7 G/DL (ref 3.5–5)
ALP SERPL-CCNC: 59 U/L (ref 34–104)
ALT SERPL W P-5'-P-CCNC: 11 U/L (ref 7–52)
ANION GAP SERPL CALCULATED.3IONS-SCNC: 6 MMOL/L (ref 4–13)
AST SERPL W P-5'-P-CCNC: 13 U/L (ref 13–39)
ATRIAL RATE: 54 BPM
BACTERIA UR QL AUTO: ABNORMAL /HPF
BASOPHILS # BLD AUTO: 0.05 THOUSANDS/ΜL (ref 0–0.1)
BASOPHILS NFR BLD AUTO: 1 % (ref 0–1)
BILIRUB SERPL-MCNC: 0.74 MG/DL (ref 0.2–1)
BILIRUB UR QL STRIP: NEGATIVE
BUN SERPL-MCNC: 18 MG/DL (ref 5–25)
CALCIUM SERPL-MCNC: 9.6 MG/DL (ref 8.4–10.2)
CHLORIDE SERPL-SCNC: 104 MMOL/L (ref 96–108)
CHOLEST SERPL-MCNC: 193 MG/DL
CLARITY UR: CLEAR
CO2 SERPL-SCNC: 28 MMOL/L (ref 21–32)
COLOR UR: YELLOW
CREAT SERPL-MCNC: 1.07 MG/DL (ref 0.6–1.3)
EOSINOPHIL # BLD AUTO: 0.16 THOUSAND/ΜL (ref 0–0.61)
EOSINOPHIL NFR BLD AUTO: 2 % (ref 0–6)
ERYTHROCYTE [DISTWIDTH] IN BLOOD BY AUTOMATED COUNT: 13.2 % (ref 11.6–15.1)
FOLATE SERPL-MCNC: 7.3 NG/ML
GFR SERPL CREATININE-BSD FRML MDRD: 90 ML/MIN/1.73SQ M
GLUCOSE P FAST SERPL-MCNC: 81 MG/DL (ref 65–99)
GLUCOSE SERPL-MCNC: 81 MG/DL (ref 65–140)
GLUCOSE UR STRIP-MCNC: NEGATIVE MG/DL
HCT VFR BLD AUTO: 46.3 % (ref 36.5–49.3)
HDLC SERPL-MCNC: 50 MG/DL
HGB BLD-MCNC: 15.4 G/DL (ref 12–17)
HGB UR QL STRIP.AUTO: NEGATIVE
IMM GRANULOCYTES # BLD AUTO: 0.03 THOUSAND/UL (ref 0–0.2)
IMM GRANULOCYTES NFR BLD AUTO: 0 % (ref 0–2)
KETONES UR STRIP-MCNC: ABNORMAL MG/DL
LDLC SERPL CALC-MCNC: 128 MG/DL (ref 0–100)
LEUKOCYTE ESTERASE UR QL STRIP: NEGATIVE
LYMPHOCYTES # BLD AUTO: 3.07 THOUSANDS/ΜL (ref 0.6–4.47)
LYMPHOCYTES NFR BLD AUTO: 40 % (ref 14–44)
MCH RBC QN AUTO: 31.7 PG (ref 26.8–34.3)
MCHC RBC AUTO-ENTMCNC: 33.3 G/DL (ref 31.4–37.4)
MCV RBC AUTO: 95 FL (ref 82–98)
MONOCYTES # BLD AUTO: 0.78 THOUSAND/ΜL (ref 0.17–1.22)
MONOCYTES NFR BLD AUTO: 10 % (ref 4–12)
MUCOUS THREADS UR QL AUTO: ABNORMAL
NEUTROPHILS # BLD AUTO: 3.67 THOUSANDS/ΜL (ref 1.85–7.62)
NEUTS SEG NFR BLD AUTO: 47 % (ref 43–75)
NITRITE UR QL STRIP: NEGATIVE
NON-SQ EPI CELLS URNS QL MICRO: ABNORMAL /HPF
NONHDLC SERPL-MCNC: 143 MG/DL
NRBC BLD AUTO-RTO: 0 /100 WBCS
P AXIS: 62 DEGREES
PH UR STRIP.AUTO: 5.5 [PH]
PLATELET # BLD AUTO: 261 THOUSANDS/UL (ref 149–390)
PMV BLD AUTO: 10.7 FL (ref 8.9–12.7)
POTASSIUM SERPL-SCNC: 4 MMOL/L (ref 3.5–5.3)
PR INTERVAL: 134 MS
PROT SERPL-MCNC: 7.1 G/DL (ref 6.4–8.4)
PROT UR STRIP-MCNC: ABNORMAL MG/DL
QRS AXIS: 84 DEGREES
QRSD INTERVAL: 96 MS
QT INTERVAL: 420 MS
QTC INTERVAL: 398 MS
RBC # BLD AUTO: 4.86 MILLION/UL (ref 3.88–5.62)
RBC #/AREA URNS AUTO: ABNORMAL /HPF
SODIUM SERPL-SCNC: 138 MMOL/L (ref 135–147)
SP GR UR STRIP.AUTO: >=1.03 (ref 1–1.03)
T WAVE AXIS: 68 DEGREES
TREPONEMA PALLIDUM IGG+IGM AB [PRESENCE] IN SERUM OR PLASMA BY IMMUNOASSAY: NORMAL
TRIGL SERPL-MCNC: 77 MG/DL
TSH SERPL DL<=0.05 MIU/L-ACNC: 2.87 UIU/ML (ref 0.45–4.5)
UROBILINOGEN UR STRIP-ACNC: <2 MG/DL
VENTRICULAR RATE: 54 BPM
VIT B12 SERPL-MCNC: 205 PG/ML (ref 180–914)
WBC # BLD AUTO: 7.76 THOUSAND/UL (ref 4.31–10.16)
WBC #/AREA URNS AUTO: ABNORMAL /HPF

## 2024-10-17 PROCEDURE — 82746 ASSAY OF FOLIC ACID SERUM: CPT | Performed by: PSYCHIATRY & NEUROLOGY

## 2024-10-17 PROCEDURE — 80061 LIPID PANEL: CPT | Performed by: PSYCHIATRY & NEUROLOGY

## 2024-10-17 PROCEDURE — 84443 ASSAY THYROID STIM HORMONE: CPT | Performed by: PSYCHIATRY & NEUROLOGY

## 2024-10-17 PROCEDURE — 81001 URINALYSIS AUTO W/SCOPE: CPT | Performed by: PSYCHIATRY & NEUROLOGY

## 2024-10-17 PROCEDURE — 99253 IP/OBS CNSLTJ NEW/EST LOW 45: CPT | Performed by: PHYSICIAN ASSISTANT

## 2024-10-17 PROCEDURE — 82607 VITAMIN B-12: CPT | Performed by: PSYCHIATRY & NEUROLOGY

## 2024-10-17 PROCEDURE — 85025 COMPLETE CBC W/AUTO DIFF WBC: CPT | Performed by: PSYCHIATRY & NEUROLOGY

## 2024-10-17 PROCEDURE — 86780 TREPONEMA PALLIDUM: CPT | Performed by: PSYCHIATRY & NEUROLOGY

## 2024-10-17 PROCEDURE — 93005 ELECTROCARDIOGRAM TRACING: CPT

## 2024-10-17 PROCEDURE — 82306 VITAMIN D 25 HYDROXY: CPT | Performed by: PSYCHIATRY & NEUROLOGY

## 2024-10-17 PROCEDURE — 93010 ELECTROCARDIOGRAM REPORT: CPT | Performed by: INTERNAL MEDICINE

## 2024-10-17 PROCEDURE — 80053 COMPREHEN METABOLIC PANEL: CPT | Performed by: PSYCHIATRY & NEUROLOGY

## 2024-10-17 PROCEDURE — 99223 1ST HOSP IP/OBS HIGH 75: CPT | Performed by: STUDENT IN AN ORGANIZED HEALTH CARE EDUCATION/TRAINING PROGRAM

## 2024-10-17 RX ORDER — DIVALPROEX SODIUM 500 MG/1
500 TABLET, FILM COATED, EXTENDED RELEASE ORAL DAILY
Status: DISCONTINUED | OUTPATIENT
Start: 2024-10-17 | End: 2024-10-20

## 2024-10-17 RX ORDER — OLANZAPINE 5 MG/1
5 TABLET ORAL
Status: DISCONTINUED | OUTPATIENT
Start: 2024-10-17 | End: 2024-10-28 | Stop reason: HOSPADM

## 2024-10-17 RX ADMIN — NICOTINE POLACRILEX 4 MG: 4 GUM, CHEWING BUCCAL at 21:51

## 2024-10-17 RX ADMIN — NICOTINE POLACRILEX 4 MG: 4 GUM, CHEWING BUCCAL at 14:05

## 2024-10-17 RX ADMIN — NICOTINE POLACRILEX 4 MG: 4 GUM, CHEWING BUCCAL at 18:48

## 2024-10-17 RX ADMIN — NICOTINE POLACRILEX 4 MG: 4 GUM, CHEWING BUCCAL at 09:16

## 2024-10-17 RX ADMIN — OLANZAPINE 5 MG: 5 TABLET, FILM COATED ORAL at 21:49

## 2024-10-17 RX ADMIN — DIVALPROEX SODIUM 500 MG: 500 TABLET, EXTENDED RELEASE ORAL at 11:51
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
## 2024-10-17 NOTE — CONSULTS
Consultation - Hospitalist   Name: Raudel Bland 34 y.o. male I MRN: [id number]  Unit/Bed#: -01 I Date of Admission: 10/16/2024   Date of Service: 10/17/2024 I Hospital Day: 1   Inpatient consult for Medical Clearance for  patient  Consult performed by: Kalyan Jenkins PA-C  Consult ordered by: Cathy Garrison MD        Physician Requesting Evaluation: Ivy Andrea*   Reason for Evaluation / Principal Problem: Medical clearance    Assessment & Plan  Bipolar disorder (HCC)  Patient under 201 status for depression  Management per psychiatry team  Cannabis use disorder  UDS positive for THC  Encouraged cessation  PTSD (post-traumatic stress disorder)  Outpatient case management and cognitive behavioral therapy  Medical clearance for psychiatric admission  Patient is medically stable to continue inpatient psychiatric treatment.  Contact [facility] with any questions or concerns.    Counseling / Coordination of Care Time: 30 minutes.  Greater than 50% of total time spent on patient counseling and coordination of care.      History of Present Illness:    Raudel Bland is a 34 y.o. male who is originally admitted to the Psychiatry service due to bipolar disorder. We are consulted for medical clearance. Patient should continue all prior to admission medications as prescribed by primary care provider/outpatient specialists.   Available admission lab work and vitals are acceptable.  Patient feels a baseline physical health.  Patient appears medically stable for inpatient psychiatric treatment at this time. Please contact [facility] with any medical questions or concerns if issues should arise.     Review of Systems:    ROS unable to be preformed due to psychiatric disorder.    Past Medical and Surgical History:     Past Medical History:   Diagnosis Date    Bipolar 1 disorder (HCC)     Manic depression (HCC)        No past surgical history on file.    Meds/Allergies:    all medications and allergies  "reviewed    Allergies:   Allergies   Allergen Reactions    Penicillins Anaphylaxis       Social History:     Marital Status: Single    Substance Use History:   Social History     Substance and Sexual Activity   Alcohol Use Yes    Alcohol/week: 1.0 standard drink of alcohol    Types: 1 Cans of beer per week    Comment: social drinking 1-2 beers     Social History     Tobacco Use   Smoking Status Every Day    Current packs/day: 0.50    Types: Cigarettes   Smokeless Tobacco Never     Social History     Substance and Sexual Activity   Drug Use Not Currently    Types: Methamphetamines, Marijuana    Comment: last use july 2024       Family History:    non-contributory    Physical Exam:     Vitals:   Blood Pressure: 113/71 (10/17/24 0756)  Pulse: 63 (10/17/24 0756)  Temperature: (!) 97.1 °F (36.2 °C) (10/17/24 0756)  Temp Source: Tympanic (10/17/24 0756)  Respirations: 18 (10/17/24 0756)  Height: 5' 8\" (172.7 cm) (10/16/24 2303)  Weight - Scale: 59.6 kg (131 lb 6.4 oz) (10/16/24 2303)  SpO2: 97 % (10/17/24 0756)    Physical Exam  Constitutional:       General: He is not in acute distress.     Appearance: Normal appearance.   HENT:      Head: Normocephalic.      Nose: Nose normal.      Mouth/Throat:      Mouth: Mucous membranes are moist.      Pharynx: Oropharynx is clear.   Eyes:      General: No scleral icterus.     Extraocular Movements: Extraocular movements intact.      Conjunctiva/sclera: Conjunctivae normal.      Pupils: Pupils are equal, round, and reactive to light.   Cardiovascular:      Rate and Rhythm: Normal rate.      Heart sounds: No murmur heard.     No friction rub. No gallop.   Pulmonary:      Effort: Pulmonary effort is normal. No respiratory distress.      Breath sounds: Normal breath sounds. No stridor. No wheezing, rhonchi or rales.   Abdominal:      General: Bowel sounds are normal. There is no distension.      Palpations: Abdomen is soft.      Tenderness: There is no abdominal tenderness. There is no " "guarding.   Musculoskeletal:         General: No deformity. Normal range of motion.      Cervical back: Neck supple.      Right lower leg: No edema.      Left lower leg: No edema.   Skin:     General: Skin is warm and dry.      Coloration: Skin is not jaundiced.   Neurological:      General: No focal deficit present.      Mental Status: He is alert and oriented to person, place, and time. Mental status is at baseline.      Cranial Nerves: Cranial nerves 2-12 are intact. No cranial nerve deficit.           Additional Data:     Lab Results: I have personally reviewed pertinent reports.                      No results found for: \"HGBA1C\"            Imaging: I have personally reviewed pertinent reports.      No orders to display       EKG, Pathology, and Other Studies Reviewed on Admission:   Prior pertinent studies and records reviewed in [facility]Care Everywhere.    ** Please Note: This note has been constructed using a voice recognition system. **  "
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
## 2024-10-17 NOTE — PLAN OF CARE
Problem: Depression  Goal: Treatment Goal: Demonstrate behavioral control of depressive symptoms, verbalize feelings of improved mood/affect, and adopt new coping skills prior to discharge  Outcome: Progressing     Problem: Anxiety  Goal: Anxiety is at manageable level  Description: Interventions:  - Assess and monitor patient's anxiety level.   - Monitor for signs and symptoms (heart palpitations, chest pain, shortness of breath, headaches, nausea, feeling jumpy, restlessness, irritable, apprehensive).   - Collaborate with interdisciplinary team and initiate plan and interventions as ordered.  - [place] patient to unit/surroundings  - Explain treatment plan  - Encourage participation in care  - Encourage verbalization of concerns/fears  - Identify coping mechanisms  - Assist in developing anxiety-reducing skills  - Administer/offer alternative therapies  - Limit or eliminate stimulants  Outcome: Progressing     Problem: SELF HARM/SUICIDALITY  Goal: Will have no self-injury during hospital stay  Description: INTERVENTIONS:  - Q 15 MINUTES: Routine safety checks  - Q WAKING SHIFT & PRN: Assess risk to determine if routine checks are adequate to maintain patient safety  - Encourage patient to participate actively in care by formulating a plan to combat response to suicidal ideation, identify supports and resources  Outcome: Progressing     Problem: DISCHARGE PLANNING  Goal: Discharge to home or other facility with appropriate resources  Description: INTERVENTIONS:  - Identify barriers to discharge w/patient and caregiver  - Arrange for needed discharge resources and transportation as appropriate  - Identify discharge learning needs (meds, wound care, etc.)  - Arrange for interpretive services to assist at discharge as needed  - Refer to Case Management Department for coordinating discharge planning if the patient needs post-hospital services based on physician/advanced practitioner order or complex needs related to  functional status, cognitive ability, or social support system  Outcome: Progressing

## 2024-10-17 NOTE — PROGRESS NOTES
Pt completed safety/relapse prevention plan with assistance from this writer. Identified warning signs, coping strategies, and supports. Discussed resources for relapse prevention and management.

## 2024-10-17 NOTE — EMTALA/ACUTE CARE TRANSFER
[facility] EMERGENCY DEPARTMENT  [address]  [facility] [place] [address]  Dept: [phone]      EMTALA TRANSFER CONSENT    NAME Raudel Bland                                         1990                              MRN [id number]    I have been informed of my rights regarding examination, treatment, and transfer   by Dr. Juventino Hernadez DO    Benefits: Specialized equipment and/or services available at the receiving facility (Include comment)________________________, Continuity of care ([facility])    Risks: Potential for delay in receiving treatment, Potential deterioration of medical condition, Increased discomfort during transfer, Possible worsening of condition or death during transfer      Consent for Transfer:  I acknowledge that my medical condition has been evaluated and explained to me by the emergency department physician or other qualified medical person and/or my attending physician, who has recommended that I be transferred to the service of  Accepting Physician: MD Meli at Accepting Facility Name, City & State : [facility]. The above potential benefits of such transfer, the potential risks associated with such transfer, and the probable risks of not being transferred have been explained to me, and I fully understand them.  The doctor has explained that, in my case, the benefits of transfer outweigh the risks.  I agree to be transferred.    I authorize the performance of emergency medical procedures and treatments upon me in both transit and upon arrival at the receiving facility.  Additionally, I authorize the release of any and all medical records to the receiving facility and request they be transported with me, if possible.  I understand that the safest mode of transportation during a medical emergency is an ambulance and that the Hospital advocates the use of this mode of transport. Risks of traveling to the receiving facility by car, including  absence of medical control, life sustaining equipment, such as oxygen, and medical personnel has been explained to me and I fully understand them.    (RUI CORRECT BOX BELOW)  [  ]  I consent to the stated transfer and to be transported by ambulance/helicopter.  [  ]  I consent to the stated transfer, but refuse transportation by ambulance and accept full responsibility for my transportation by car.  I understand the risks of non-ambulance transfers and I exonerate the Hospital and its staff from any deterioration in my condition that results from this refusal.    X___________________________________________    DATE  10/16/24  TIME________  Signature of patient or legally responsible individual signing on patient behalf           RELATIONSHIP TO PATIENT_________________________          Provider Certification    NAME Raudel Bland                                         1990                              MRN [id number]    A medical screening exam was performed on the above named patient.  Based on the examination:    Condition Necessitating Transfer The encounter diagnosis was Tobacco abuse.    Patient Condition: The patient has been stabilized such that within reasonable medical probability, no material deterioration of the patient condition or the condition of the unborn child(alissa) is likely to result from the transfer    Reason for Transfer: Level of Care needed not available at this facility ([facility])    Transfer Requirements: Facility [facility]   Space available and qualified personnel available for treatment as acknowledged by Zach  Agreed to accept transfer and to provide appropriate medical treatment as acknowledged by       MD Meli  Appropriate medical records of the examination and treatment of the patient are provided at the time of transfer   STAFF INITIAL WHEN COMPLETED _______  Transfer will be performed by qualified personnel from    and appropriate transfer equipment as  required, including the use of necessary and appropriate life support measures.    Provider Certification: I have examined the patient and explained the following risks and benefits of being transferred/refusing transfer to the patient/family:  General risk, such as traffic hazards, adverse weather conditions, rough terrain or turbulence, possible failure of equipment (including vehicle or aircraft), or consequences of actions of persons outside the control of the transport personnel, Risk of worsening condition, Unanticipated needs of medical equipment and personnel during transport      Based on these reasonable risks and benefits to the patient and/or the unborn child(alissa), and based upon the information available at the time of the patient’s examination, I certify that the medical benefits reasonably to be expected from the provision of appropriate medical treatments at another medical facility outweigh the increasing risks, if any, to the individual’s medical condition, and in the case of labor to the unborn child, from effecting the transfer.    X____________________________________________ DATE 10/16/24        TIME_______      ORIGINAL - SEND TO MEDICAL RECORDS   COPY - SEND WITH PATIENT DURING TRANSFER

## 2024-10-17 NOTE — ASSESSMENT & PLAN NOTE
Patient is medically stable to continue inpatient psychiatric treatment.  Contact [facility] with any questions or concerns.

## 2024-10-17 NOTE — NURSING NOTE
Pleasant and  cooperative , seclusive to room for long intervals ,denies any issues at this time, feels his meds are working and he feels better since admission

## 2024-10-17 NOTE — NURSING NOTE
Patient rambling in conversation , patient pleasant cooperative, denies SI HI AVH. Patient reports racing thoughts, feeling labile, patient stated in wants to regain control of his thoughts. Patient wants to get back on his medications. Patient appeared to be scratching his neck, frequently, Suggested shower, patient compliant

## 2024-10-17 NOTE — PLAN OF CARE
Problem: Depression  Goal: Treatment Goal: Demonstrate behavioral control of depressive symptoms, verbalize feelings of improved mood/affect, and adopt new coping skills prior to discharge  Outcome: Progressing     Problem: Anxiety  Goal: Anxiety is at manageable level  Description: Interventions:  - Assess and monitor patient's anxiety level.   - Monitor for signs and symptoms (heart palpitations, chest pain, shortness of breath, headaches, nausea, feeling jumpy, restlessness, irritable, apprehensive).   - Collaborate with interdisciplinary team and initiate plan and interventions as ordered.  - [place] patient to unit/surroundings  - Explain treatment plan  - Encourage participation in care  - Encourage verbalization of concerns/fears  - Identify coping mechanisms  - Assist in developing anxiety-reducing skills  - Administer/offer alternative therapies  - Limit or eliminate stimulants  Outcome: Progressing

## 2024-10-17 NOTE — TREATMENT PLAN
TREATMENT PLAN REVIEW - Behavioral Health Raudel Bland 34 y.o. 1990 male MRN: [id number]    [facility] QU IP BEHAVIORAL HLTH Room / Bed: [facility] 210/[facility] 210-01 Encounter: [id number]          Admit Date/Time:  10/16/2024 10:40 PM    Treatment Team:   MD Alicja Xavier LPN Samantha Clauser, EDWIN Agustin RN Kristen Masiado    Diagnosis: Principal Problem:    Bipolar disorder (HCC)  Active Problems:    Medical clearance for psychiatric admission    Cannabis use disorder    PTSD (post-traumatic stress disorder)      Patient Strengths/Assets: cooperative, family ties, motivation for treatment/growth, patient is on a voluntary commitment    Patient Barriers/Limitations: difficulty adapting, financial instability, noncompliant with medication, substance abuse    Short Term Goals: decrease in depressive symptoms, decrease in anxiety symptoms, decrease in paranoid thoughts, decrease in suicidal thoughts, improvement in insight, sleep improvement, improvement in appetite, mood stabilization    Long Term Goals: improvement in depression, improvement in anxiety, resolution of depressive symptoms, resolution of manic symptoms, stabilization of mood, free of suicidal thoughts, improved insight, acceptance of need for psychiatric medications, acceptance of need for psychiatric treatment, adequate self care, adequate sleep, adequate appetite    Progress Towards Goals: starting psychiatric medications as prescribed, improving, attends groups, mood is stabilizing, less agitated, less anxious, less irritable, less labile, no longer suicidal    Recommended Treatment: medication management, patient medication education, group therapy, milieu therapy, continued Behavioral Health psychiatric evaluation/assessment process    Treatment Frequency: daily medication  monitoring, group and milieu therapy daily, monitoring through interdisciplinary rounds, monitoring through weekly patient care conferences    Expected Discharge Date:  5-7 days    Discharge Plan: referral for outpatient medication management with a psychiatrist, referral for outpatient psychotherapy    Treatment Plan Created/Updated By: Ivy Montoya MD

## 2024-10-17 NOTE — PROGRESS NOTES
Status: Pt is on a 201 commitment from [facility] where he presented with manic behaviors & easily frustrated.  Pt reported having been off of medication for 2 months & would like to get restarted.  Pt dnied any SI/HI or hallucinations but reported feeling hopeless as he has not been able to find a job.  Pt reported he has been clean from meth for a year.  Reviewed readmit score: 23(RED), AUDIT: 0, PAWSS: 0, BAT: 0, UDS: + THC   Medication: to be reviewed / PRN - Motrin  D/C: 5-7 days (tentative 10/23) / new admission       10/17/24 0750   Team Meeting   Meeting Type Daily Rounds   Team Members Present   Team Members Present Physician;Nurse;;Other (Discipline and Name)   Physician Team Member Dr. Bone / JAM Garay / OLIVIA Guerrero / PA Student   Nursing Team Member Manuel / Theodore   Care Management Team Member Gianna / Mita   Other (Discipline and Name) Rashel(group facilitator)   Patient/Family Present   Patient Present No   Patient's Family Present No

## 2024-10-17 NOTE — H&P
"Psychiatric Evaluation - Behavioral Health   Name: Raudel Bland 34 y.o. male I MRN: [id number]  Unit/Bed#: -01 I Date of Admission: 10/16/2024   Date of Service: 10/17/2024 I Hospital Day: 1     Assessment & Plan  Bipolar disorder (HCC)  Start Depakote  mg daily  Start Zyprexa 5 mg at bedtime  Medical clearance for psychiatric admission    Cannabis use disorder    PTSD (post-traumatic stress disorder)       Admit to [facility] on 201 status for safety and treatment  No 1:1 continuous observation needed at this time, as patient feels safe on the unit.  Check admission labs.  Get collaterals.  Collaborate with family for baseline assessment and disposition planning.  Case discussed with treatment team.    Chief Complaint: \" I feel hopeless.  I am irritable and angry easily\"    History of Present Illness       Per Crisis worker on 10/16:\"34 y.o male patient presented to the ED with a history of bipolar, depression, ADHD, ODD and PTSD. Pt reported he has been off his medications for about 2 months due to not being able afford the medications. Pt stated he and his family has noticed him having more manic behaviors and becoming easily frustrated. Pt had manic behaviors during the assessment of rapid speech. Pt has been depressed due to the stressors in his life of not being able to find employment, financial instability, and marital issues. Pt denies any SI but does feel hopeless at times when he is turned down for employment and feels he is letting his family down. Pt has had previous SI in the past to overdose. Pt denies HI and AVH. Pt denies any legal issues. Pt has past substance abuse issues. Pt has past inpatient hospitalization in 2023. Pt has no outpatient Psychiatry and Therapy. Pt is willing to sign a 201 for inpatient mental health treatment. Provider is in agreement with this treatment plan. \"    This is 34-year-old male with history of depression/bipolar disorder and substance use " admitted to inpatient unit on voluntary status for worsening of mood and passive death wishes in the context of psychosocial stressors and noncompliance with treatment.  Patient reports that he was not able to afford his medications which led to noncompliance and decompensation.  Patient endorses depressed mood, anhedonia, hopelessness, low self-esteem, lack of motivation and passive death wishes.  Denies any thoughts or intent or plan.  Sleep and appetite are poor. he also reports periods of high energy, irritability, racing thoughts and impulsivity.  Endorses mild paranoia at times.  Denies any hallucinations.  Patient appears tearful, depressed, labile and dramatic.  Psychiatric Review Of Systems:  Medication side effects: none  Sleep: Poor  Appetite: Poor  Hygiene: able to tend to instrumental and basic ADLs  Anxiety: yes  Psychotic Symptoms: denies  Depression Symptoms: Yes  Manic Symptoms: History of gianna  PTSD Symptoms: denies  Obsession/compulsions: Denies  Eating disorders: Denies  Suicidal Thoughts: denies  Homicidal Thoughts: denies    Medical Review Of Systems:   Complete ROS is negative, except as noted above.    Scheduled medications:  Current Facility-Administered Medications   Medication Dose Route Frequency Provider Last Rate    aluminum-magnesium hydroxide-simethicone  30 mL Oral Q4H PRN Cathy Garrison MD      haloperidol lactate  2.5 mg Intramuscular Q4H PRN Max 4/day Cathy Garrison MD      And    LORazepam  1 mg Intramuscular Q4H PRN Max 4/day Cathy Garrison MD      And    benztropine  0.5 mg Intramuscular Q4H PRN Max 4/day Cathy Garrison MD      haloperidol lactate  5 mg Intramuscular Q4H PRN Max 4/day Cathy Garrison MD      And    LORazepam  2 mg Intramuscular Q4H PRN Max 4/day Cathy Garrison MD      And    benztropine  1 mg Intramuscular Q4H PRN Max 4/day Cathy Garrison MD      benztropine  1 mg Intramuscular Q4H PRN Max 6/day Cathy Garrison MD      benztropine  1 mg  Oral Q4H PRN Max 6/day Cathy Garrison MD      bisacodyl  10 mg Rectal Daily PRN Cathy Garrison MD      hydrOXYzine HCL  50 mg Oral Q6H PRN Max 4/day Cathy Garrison MD      Or    diphenhydrAMINE  50 mg Intramuscular Q6H PRN Cathy Garrison MD      divalproex sodium  500 mg Oral Daily Ivy Montoya MD      haloperidol  1 mg Oral Q6H PRN Cathy Garrison MD      haloperidol  2.5 mg Oral Q4H PRN Max 4/day Cathy Garrison MD      haloperidol  5 mg Oral Q4H PRN Max 4/day Cathy Garrison MD      hydrOXYzine HCL  100 mg Oral Q6H PRN Max 4/day Cathy Garrison MD      Or    LORazepam  2 mg Intramuscular Q6H PRN Cathy Garrison MD      hydrOXYzine HCL  25 mg Oral Q6H PRN Max 4/day Cathy Garrison MD      ibuprofen  400 mg Oral Q4H PRN Cathy Garrison MD      ibuprofen  600 mg Oral Q6H PRN Cathy Garrison MD      ibuprofen  800 mg Oral Q8H PRN Cathy Garrison MD      nicotine polacrilex  4 mg Oral Q2H PRN Cathy Garrison MD      OLANZapine  5 mg Oral HS Ivy Montoya MD      polyethylene glycol  17 g Oral Daily PRN Cathy Garrison MD      propranolol  10 mg Oral Q8H PRN Cathy Garrison MD      senna-docusate sodium  1 tablet Oral Daily PRN Cathy Garrison MD      traZODone  50 mg Oral HS PRN Cathy Garrison MD          PRN:    aluminum-magnesium hydroxide-simethicone    haloperidol lactate **AND** LORazepam **AND** benztropine    haloperidol lactate **AND** LORazepam **AND** benztropine    benztropine    benztropine    bisacodyl    hydrOXYzine HCL **OR** diphenhydrAMINE    haloperidol    haloperidol    haloperidol    hydrOXYzine HCL **OR** LORazepam    hydrOXYzine HCL    ibuprofen    ibuprofen    ibuprofen    nicotine polacrilex    polyethylene glycol    propranolol    senna-docusate sodium    traZODone    Diet:       Diet Orders   (From admission, onward)                 Start     Ordered    10/16/24 2869  Diet Regular; Regular House  Diet effective now         References:    Adult Nutrition Support Algorithm    RD Therapeutic Diet Order Protocol   Question Answer Comment   Diet Type Regular    Regular Regular House    RD to adjust diet per protocol? Yes        10/16/24 2249                     - Observation: routine            - VS: as per unit protocol  - Legal status: 201  - Psychoeducation (benefits and potential risks) discussed, importance of compliance with the psychiatric treatment reiterated, and the patient verbalized understanding of the matter  - Encourage group attendance and milieu therapy   - The pt was educated and agreed to verbalize any suicidal thoughts, frustrations or concerns to the nursing staff, immediately.   - Dispo: To be determined            Historical Information     Psychiatric History:   Psychiatry diagnosis: Bipolar/depression  Inpatient Hx: Yes  Suicidal Hx: Denies  Self harming behavior Hx: Denies  Violent behavior Hx: Denies  Access to firearms: Denies   Outpatient Hx: None  Medications/Trials: Zyprexa, Abilify, Depakote, Cymbalta Wellbutrin Concerta      Substance Abuse History:    History of methamphetamines use.  Occasional cannabis use.  UDS positive for THC  Social History     Substance and Sexual Activity   Alcohol Use Yes    Alcohol/week: 1.0 standard drink of alcohol    Types: 1 Cans of beer per week    Comment: social drinking 1-2 beers     Social History     Substance and Sexual Activity   Drug Use Not Currently    Types: Methamphetamines, Marijuana    Comment: last use july 2024       Family Psychiatric History:   History reviewed. No pertinent family history.    Social History:  Social History     Socioeconomic History    Marital status: Single     Spouse name: Not on file    Number of children: Not on file    Years of education: Not on file    Highest education level: Not on file   Occupational History    Not on file   Tobacco Use    Smoking status: Every Day     Current packs/day: 0.50     Types: Cigarettes     Smokeless tobacco: Never   Vaping Use    Vaping status: Former    Substances: THC   Substance and Sexual Activity    Alcohol use: Yes     Alcohol/week: 1.0 standard drink of alcohol     Types: 1 Cans of beer per week     Comment: social drinking 1-2 beers    Drug use: Not Currently     Types: Methamphetamines, Marijuana     Comment: last use july 2024    Sexual activity: Yes     Partners: Female   Other Topics Concern    Not on file   Social History Narrative    Not on file     Social Determinants of Health     Financial Resource Strain: Not on file   Food Insecurity: Not on file   Transportation Needs: Not on file   Physical Activity: Not on file   Stress: Not on file   Social Connections: Not on file   Intimate Partner Violence: Not on file   Housing Stability: Not on file       Education: High school  Learning Disabilities denies  Marital history:   Living arrangement: Yes   Occupational History: Unemployed  Functioning Relationships: Yes  Legal issues: None   hx:None      Traumatic History:   Abuse:Denies  Other Traumatic Events: None    Past Medical History:   Diagnosis Date    Bipolar 1 disorder (HCC)     Manic depression (HCC)        Medical Review Of Systems:  Pertinent items are noted in HPI; all others negative    Meds/Allergies   all current active meds have been reviewed  Allergies   Allergen Reactions    Penicillins Anaphylaxis       Objective      Mental Status Evaluation:  Appearance and attitude: appeared as stated age, disheveled, dressed in hospital attire  Eye contact: fair  Motor Function: within normal limits, intact gait, No PMA/PMR  Gait/station: normal gait/station  Speech: normal for rate, rhythm, volume, latency, amount  Language: No overt abnormality  Mood/affect: depressed, dysphoric / Affect was constricted but reactive, mood congruent, tearful, labile  Thought Processes: sequential and goal-directed, logical, coherent, organized  Thought content: denies suicidal ideation  or homicidal ideation; no delusions or first rank symptoms  Associations: intact associations  Perceptual disturbances: denies Auditory/Visual/Tactile Hallucinations  Orientation: oriented to time, person, place and to the situational context  Cognitive Function: intact  Memory: recent and remote memory grossly intact  Intellect: average  Fund of knowledge: aware of current events, aware of past history, and vocabulary average  Impulse control: fair  Insight/judgment: fair/fair      Lab results: I have personally reviewed all pertinent laboratory/tests results  Most Recent Labs:   Lab Results   Component Value Date    WBC 7.76 10/17/2024    RBC 4.86 10/17/2024    HGB 15.4 10/17/2024    HCT 46.3 10/17/2024     10/17/2024    RDW 13.2 10/17/2024    NEUTROABS 3.67 10/17/2024    SODIUM 139 06/18/2023    K 3.9 06/18/2023     06/18/2023    CO2 30 06/18/2023    BUN 14 06/18/2023    CREATININE 1.05 06/18/2023    GLUC 90 06/18/2023    CALCIUM 9.2 06/18/2023    AST 11 08/20/2021    ALT 19 08/20/2021    ALKPHOS 83 08/20/2021    TP 6.7 08/20/2021    ALB 3.9 08/20/2021    TBILI 0.4 08/20/2021    CHOLESTEROL 118 06/18/2023    HDL 45 06/18/2023    TRIG 72 06/18/2023    LDLCALC 59 06/18/2023    NONHDLC 73 06/18/2023    NRT5RYNPLQLY 1.552 06/18/2023       Imaging Studies: No results found.    EKG, Pathology, and Other Studies: Reviewed. and New EKG ordered.    Advance Directive and Living Will: <no information>    Patient Strengths/Assets: ability for insight, cooperative, motivation for treatment/growth, patient is on a voluntary commitment    Patient Barriers/Limitations: financial instability, limited support system, noncompliant with medication, noncompliant with treatment, substance abuse    Suicide/Homicide Risk Assessment:    Risk of Harm to Self:   Nursing Suicide Risk Assessment Last 24 hours: C-SSRS Risk (Since Last Contact)  Calculated C-SSRS Risk Score (Since Last Contact): No Risk Indicated    Risk of Harm  to Others:  Nursing Homicide Risk Assessment: Violence Risk to Others: Denies within past 6 months    The following interventions are recommended: Behavioral Health checks for safety monitoring, continued hospitalization on locked unit    Counseling / Coordination of Care:    Patient's presentation on admission and proposed treatment plan discussed with treatment team.  Diagnosis, medication changes and treatment plan reviewed with patient.  Events leading to admission reviewed with patient.  Discussed with patient need for drug and alcohol rehabilitation treatment upon discharge  Outpatient follow up discussed with patient.  Supportive therapy provided to patient.    Inpatient Psychiatric Certification:    Estimated length of stay: 7 midnights          This note was completed in part utilizing Dragon dictation Software. Grammatical, translation, syntax errors, random word insertions, spelling mistakes, and incomplete sentences may be an occasional consequence of this system secondary to software limitations with voice recognition, ambient noise, and hardware issues. If you have any questions or concerns about the content, text, or information contained within the body of this dictation, please contact the provider for clarification.

## 2024-10-18 PROCEDURE — 99232 SBSQ HOSP IP/OBS MODERATE 35: CPT | Performed by: STUDENT IN AN ORGANIZED HEALTH CARE EDUCATION/TRAINING PROGRAM

## 2024-10-18 RX ORDER — XYLITOL/YERBA SANTA
5 AEROSOL, SPRAY WITH PUMP (ML) MUCOUS MEMBRANE 4 TIMES DAILY PRN
Status: DISCONTINUED | OUTPATIENT
Start: 2024-10-18 | End: 2024-10-18

## 2024-10-18 RX ORDER — ACETAMINOPHEN 325 MG/1
650 TABLET ORAL EVERY 6 HOURS PRN
Status: DISCONTINUED | OUTPATIENT
Start: 2024-10-18 | End: 2024-10-18

## 2024-10-18 RX ADMIN — DIVALPROEX SODIUM 500 MG: 500 TABLET, EXTENDED RELEASE ORAL at 08:51

## 2024-10-18 RX ADMIN — NICOTINE POLACRILEX 4 MG: 4 GUM, CHEWING BUCCAL at 17:04

## 2024-10-18 RX ADMIN — OLANZAPINE 5 MG: 5 TABLET, FILM COATED ORAL at 21:47

## 2024-10-18 RX ADMIN — NICOTINE POLACRILEX 4 MG: 4 GUM, CHEWING BUCCAL at 10:30

## 2024-10-18 RX ADMIN — TRAZODONE HYDROCHLORIDE 50 MG: 50 TABLET ORAL at 22:38

## 2024-10-18 RX ADMIN — NICOTINE POLACRILEX 4 MG: 4 GUM, CHEWING BUCCAL at 14:52

## 2024-10-18 NOTE — NURSING NOTE
Pt was mainly withdrawn to his room sleeping for first part of shift. Pt showered before lunch and was more visible on unit and was observed social with peers. He reports overall improvement since arrival to unit and denies any SI/HI/AVH. Pt is forward thinking and reports feeling ready to discharge when able. Pt encouraged to attend groups for remainder of day.

## 2024-10-18 NOTE — PLAN OF CARE
Problem: Depression  Goal: Treatment Goal: Demonstrate behavioral control of depressive symptoms, verbalize feelings of improved mood/affect, and adopt new coping skills prior to discharge  Outcome: Progressing     Problem: Anxiety  Goal: Anxiety is at manageable level  Description: Interventions:  - Assess and monitor patient's anxiety level.   - Monitor for signs and symptoms (heart palpitations, chest pain, shortness of breath, headaches, nausea, feeling jumpy, restlessness, irritable, apprehensive).   - Collaborate with interdisciplinary team and initiate plan and interventions as ordered.  - [place] patient to unit/surroundings  - Explain treatment plan  - Encourage participation in care  - Encourage verbalization of concerns/fears  - Identify coping mechanisms  - Assist in developing anxiety-reducing skills  - Administer/offer alternative therapies  - Limit or eliminate stimulants  Outcome: Progressing     Problem: SELF HARM/SUICIDALITY  Goal: Will have no self-injury during hospital stay  Description: INTERVENTIONS:  - Q 15 MINUTES: Routine safety checks  - Q WAKING SHIFT & PRN: Assess risk to determine if routine checks are adequate to maintain patient safety  - Encourage patient to participate actively in care by formulating a plan to combat response to suicidal ideation, identify supports and resources  Outcome: Progressing

## 2024-10-18 NOTE — PLAN OF CARE
Problem: DISCHARGE PLANNING  Goal: Discharge to home or other facility with appropriate resources  Description: INTERVENTIONS:  - Identify barriers to discharge w/patient and caregiver  - Arrange for needed discharge resources and transportation as appropriate  - Identify discharge learning needs (meds, wound care, etc.)  - Arrange for interpretive services to assist at discharge as needed  - Refer to Case Management Department for coordinating discharge planning if the patient needs post-hospital services based on physician/advanced practitioner order or complex needs related to functional status, cognitive ability, or social support system  Outcome: Progressing  Note: Pt met with CM to complete intake; Pt declined to sign any ROIs at this time.  Pt reviewed & signed his Treatment Plan.       12

## 2024-10-18 NOTE — PROGRESS NOTES
Progress Note - Behavioral Health     Name: Raudel Bland 34 y.o. male I MRN: [id number]   Unit/Bed#: -01 I Date of Admission: 10/16/2024   Date of Service: 10/18/2024 I Hospital Day: 2         Assessment & Plan  Bipolar disorder (HCC)  Start Depakote  mg daily  Start Zyprexa 5 mg at bedtime  Medical clearance for psychiatric admission  Done  Cannabis use disorder  Encouraged cessation  PTSD (post-traumatic stress disorder)  Psychotherapy     Principal Problem:    Bipolar disorder (HCC)  Active Problems:    Medical clearance for psychiatric admission    Cannabis use disorder    PTSD (post-traumatic stress disorder)       Recommended Treatment:     Continue current medication regimen.  VPA level to be obtained Sunday, may require slight dose adjustment, although patient has demonstrated an encouraging response thus far.    Planned medication and treatment changes:    All current active medications have been reviewed  Encourage group therapy, milieu therapy and occupational therapy  Behavioral Health checks every 15 minutes  On a 201 commitment status  Continue current medications:    Current medications:  Current Facility-Administered Medications   Medication Dose Route Frequency Provider Last Rate    aluminum-magnesium hydroxide-simethicone  30 mL Oral Q4H PRN Cathy Garrison MD      haloperidol lactate  2.5 mg Intramuscular Q4H PRN Max 4/day Cathy Garrison MD      And    LORazepam  1 mg Intramuscular Q4H PRN Max 4/day Cathy Garrison MD      And    benztropine  0.5 mg Intramuscular Q4H PRN Max 4/day Cathy Garrison MD      haloperidol lactate  5 mg Intramuscular Q4H PRN Max 4/day Cathy Garrison MD      And    LORazepam  2 mg Intramuscular Q4H PRN Max 4/day Cathy Garrison MD      And    benztropine  1 mg Intramuscular Q4H PRN Max 4/day Cathy Garrison MD      benztropine  1 mg Intramuscular Q4H PRN Max 6/day Cathy Garrison MD      benztropine  1 mg Oral Q4H PRN Max 6/day Cathy MILLER  MD Meli      bisacodyl  10 mg Rectal Daily PRN Cathy Garrison MD      hydrOXYzine HCL  50 mg Oral Q6H PRN Max 4/day Cathy Garrison MD      Or    diphenhydrAMINE  50 mg Intramuscular Q6H PRN Cathy Garrison MD      divalproex sodium  500 mg Oral Daily Ivy Montoya MD      haloperidol  1 mg Oral Q6H PRN Cathy Garrison MD      haloperidol  2.5 mg Oral Q4H PRN Max 4/day Cathy Garrison MD      haloperidol  5 mg Oral Q4H PRN Max 4/day Cathy Garrison MD      hydrOXYzine HCL  100 mg Oral Q6H PRN Max 4/day Cathy Garrison MD      Or    LORazepam  2 mg Intramuscular Q6H PRN Cathy Garrison MD      hydrOXYzine HCL  25 mg Oral Q6H PRN Max 4/day Cathy Garrison MD      ibuprofen  400 mg Oral Q4H PRN Cathy Garrison MD      ibuprofen  600 mg Oral Q6H PRN Cathy Garrison MD      ibuprofen  800 mg Oral Q8H PRN Cathy Garrison MD      nicotine polacrilex  4 mg Oral Q2H PRN Cathy Garrison MD      OLANZapine  5 mg Oral HS Ivy Montoya MD      polyethylene glycol  17 g Oral Daily PRN Cathy Garrison MD      propranolol  10 mg Oral Q8H PRKARSON Garrison MD      senna-docusate sodium  1 tablet Oral Daily PRN Cathy Garrison MD      traZODone  50 mg Oral HS PRN Cathy Garrison MD         Behavior over the last 24 hours: some improvement.     Raudel is a 34-year-old male with a significant past psychiatric history of bipolar 1 presenting to the unit for psychiatric follow-up.  Patient states he has felt calmer since admission.  Patient describes his mood as good today.  Patient states that his sleep has improved, his energy has been stable, and he has not been irritable. He is happy to be back on medications. Patient states that he looks forward to working once discharged and spending time with his child and wife.  Patient describes feeling as if he is thinking more clearly and denies racing thoughts. Offers improving insight into his psychiatric condition.  " Patient denies hallucinations and paranoia.  Patient denies suicidal ideation or homicidal ideation.    Sleep: improved  Appetite: normal  Medication side effects: No   ROS: no complaints    Mental Status Evaluation:    Appearance:  age appropriate, casually dressed, dressed appropriately, adequate grooming   Behavior:  pleasant, cooperative, calm   Speech:  normal rate, normal volume   Mood:  improved \"I feel good.\"   Affect:  brighter, mood-congruent   Thought Process:  organized, logical, goal directed, linear   Associations: intact associations   Thought Content:  no overt delusions   Perceptual Disturbances: no auditory hallucinations, no visual hallucinations   Risk Potential: Suicidal ideation - None at present  Homicidal ideation - None at present  Potential for aggression - No   Sensorium:  oriented to person, place, and time/date   Memory:  recent and remote memory grossly intact   Consciousness:  alert and awake   Attention/Concentration: attention span and concentration are age appropriate   Insight:  fair and improving   Judgment: fair   Gait/Station: normal gait/station   Motor Activity: no abnormal movements     Vital signs in last 24 hours:    Temp:  [97 °F (36.1 °C)-98.3 °F (36.8 °C)] 97 °F (36.1 °C)  HR:  [62-83] 62  BP: (110-140)/(60-78) 110/69  Resp:  [17-18] 18  SpO2:  [95 %-100 %] 100 %  O2 Device: None (Room air)    Laboratory results: I have personally reviewed all pertinent laboratory/tests results    Results from the past 24 hours: No results found for this or any previous visit (from the past 24 hour(s)).  Most Recent Labs:   Lab Results   Component Value Date    WBC 7.76 10/17/2024    RBC 4.86 10/17/2024    HGB 15.4 10/17/2024    HCT 46.3 10/17/2024     10/17/2024    RDW 13.2 10/17/2024    NEUTROABS 3.67 10/17/2024    SODIUM 138 10/17/2024    K 4.0 10/17/2024     10/17/2024    CO2 28 10/17/2024    BUN 18 10/17/2024    CREATININE 1.07 10/17/2024    GLUC 81 10/17/2024    " CALCIUM 9.6 10/17/2024    AST 13 10/17/2024    ALT 11 10/17/2024    ALKPHOS 59 10/17/2024    TP 7.1 10/17/2024    ALB 4.7 10/17/2024    TBILI 0.74 10/17/2024    CHOLESTEROL 193 10/17/2024    HDL 50 10/17/2024    TRIG 77 10/17/2024    LDLCALC 128 (H) 10/17/2024    NONHDLC 143 10/17/2024    [id number] 2.868 10/17/2024    SYPHILISAB Non-reactive 10/17/2024       Progress Toward Goals: progressing    Risks / Benefits of Treatment:    Risks, benefits, and possible side effects of medications explained to patient and patient verbalizes understanding and agreement for treatment.    Counseling / Coordination of Care:    Patient's progress discussed with staff in treatment team meeting.  Medications, treatment progress and treatment plan reviewed with patient.    Aram Guerrero PA-C 10/18/24    This note was constructed with the assistance of network approved dictation software. Please excuse any minor errors of syntax or grammar as a result.

## 2024-10-18 NOTE — PROGRESS NOTES
Treatment Plan Meeting:  Diagnosis of Bipolar disorder, Cannabis use disorder, and PTSD (post-traumatic stress disorder) reviewed.   Discussed short term goals for decrease in depressive symptoms, decrease in anxiety symptoms, decrease in paranoid thoughts, decrease in suicidal thoughts, improvement in insight, sleep improvement, improvement in appetite, and mood stabilization.   At this time, discharge is tentative for 5-7 days (10/23).  All parties in agreement and treatment plan was signed.     10/17/24 1115   Team Meeting   Meeting Type Tx Team Meeting   Initial Conference Date 10/17/24   Next Conference Date 11/15/24   Team Members Present   Team Members Present Physician;Nurse;   Physician Team Member Dr. Montoya   Nursing Team Member Manuel   Care Management Team Member Gianna   Patient/Family Present   Patient Present No   Patient's Family Present No

## 2024-10-18 NOTE — PROGRESS NOTES
Treatment Team Rounds Completed  Medical and Psychiatric Review Completed  D/C: 10/23 (tentative) / Pt reported racing thoughts & feeling labile.  Pt only has Medicare A at this time.  CM will work with him to contact [employer] & Medicare.      10/18/24 2697   Team Meeting   Meeting Type Daily Rounds   Team Members Present   Team Members Present Physician;Nurse;;Other (Discipline and Name)   Physician Team Member Dr. Montoya / Dr. Samayoa / PADARWIN Guerrero / PA Student   Nursing Team Member Renetta / Theodore   Care Management Team Member Gianna / Mita / Julius   Other (Discipline and Name) Rashel(group facilitator)   Patient/Family Present   Patient Present No   Patient's Family Present No

## 2024-10-19 PROCEDURE — 99232 SBSQ HOSP IP/OBS MODERATE 35: CPT

## 2024-10-19 RX ADMIN — TRAZODONE HYDROCHLORIDE 50 MG: 50 TABLET ORAL at 22:26

## 2024-10-19 RX ADMIN — HALOPERIDOL 2.5 MG: 5 TABLET ORAL at 20:06

## 2024-10-19 RX ADMIN — NICOTINE POLACRILEX 4 MG: 4 GUM, CHEWING BUCCAL at 20:55

## 2024-10-19 RX ADMIN — BENZTROPINE MESYLATE 1 MG: 1 TABLET ORAL at 20:06

## 2024-10-19 RX ADMIN — OLANZAPINE 5 MG: 5 TABLET, FILM COATED ORAL at 22:10

## 2024-10-19 RX ADMIN — DIVALPROEX SODIUM 500 MG: 500 TABLET, EXTENDED RELEASE ORAL at 09:07

## 2024-10-19 RX ADMIN — NICOTINE POLACRILEX 4 MG: 4 GUM, CHEWING BUCCAL at 09:12

## 2024-10-19 RX ADMIN — NICOTINE POLACRILEX 4 MG: 4 GUM, CHEWING BUCCAL at 18:22

## 2024-10-19 NOTE — PLAN OF CARE
Problem: Depression  Goal: Treatment Goal: Demonstrate behavioral control of depressive symptoms, verbalize feelings of improved mood/affect, and adopt new coping skills prior to discharge  Outcome: Progressing     Problem: Anxiety  Goal: Anxiety is at manageable level  Description: Interventions:  - Assess and monitor patient's anxiety level.   - Monitor for signs and symptoms (heart palpitations, chest pain, shortness of breath, headaches, nausea, feeling jumpy, restlessness, irritable, apprehensive).   - Collaborate with interdisciplinary team and initiate plan and interventions as ordered.  - [place] patient to unit/surroundings  - Explain treatment plan  - Encourage participation in care  - Encourage verbalization of concerns/fears  - Identify coping mechanisms  - Assist in developing anxiety-reducing skills  - Administer/offer alternative therapies  - Limit or eliminate stimulants  Outcome: Progressing     Problem: SELF HARM/SUICIDALITY  Goal: Will have no self-injury during hospital stay  Description: INTERVENTIONS:  - Q 15 MINUTES: Routine safety checks  - Q WAKING SHIFT & PRN: Assess risk to determine if routine checks are adequate to maintain patient safety  - Encourage patient to participate actively in care by formulating a plan to combat response to suicidal ideation, identify supports and resources  Outcome: Progressing

## 2024-10-19 NOTE — NURSING NOTE
"Raudel is withdrawn to his room. He is pleasant and calm when approached. Denies SI/HI/AVH. States, \"everyone here is really helpful and nice.\" Encouraged to come to staff with questions or concerns.   "

## 2024-10-19 NOTE — PROGRESS NOTES
"Progress Note - Behavioral Health   Name: Raudel Bland 34 y.o. male I MRN: [id number]  Unit/Bed#: -01 I Date of Admission: 10/16/2024   Date of Service: 10/19/2024 I Hospital Day: 3    Assessment & Plan  Bipolar disorder (HCC)  Start Depakote  mg daily  Start Zyprexa 5 mg at bedtime  Medical clearance for psychiatric admission  Done  Cannabis use disorder  Encouraged cessation  PTSD (post-traumatic stress disorder)  Psychotherapy    Progress Toward Goals:   Encourage group therapy, milieu therapy and occupational therapy  Behavioral Health checks every 7 minutes  Continue treatment with group therapy, milieu therapy and occupational therapy    Recommended Treatment: Continue with group therapy, milieu therapy and occupational therapy.    Risks, benefits and possible side effects of Medications:   Risks, benefits, and possible side effects of medications explained to patient and patient verbalizes understanding.      History of Present Illness   Behavior over the last 24 hours:  unchanged  Sleep: normal  Appetite: normal  Medication side effects: No  ROS: no complaints and all other systems are negative    Subjective: Raudel was seen today for psychiatric follow-up.  On assessment patient is calm, cooperative.  He is withdrawn to his room laying in bed.  Patient reports an \"improving mood\".  According to nursing staff report patient has had no recent self-injurious behaviors.  He is medication compliant.  He denied any SI/HI/AVH.  He did not appear internally preoccupied.    Objective   Mental Status Evaluation:  Appearance:  age appropriate and casually dressed   Behavior:  cooperative   Speech:  normal pitch and normal volume   Mood:  \"Improving\"   Affect:  mood-congruent   Thought Process:  goal directed   Associations: intact associations   Thought Content:  No overt delusions   Perceptual Disturbances: Denied AVH, did not appear internally preoccupied   Risk Potential: Suicidal Ideations none at " present  Homicidal Ideations none at present  Potential for Aggression No   Sensorium:  person, place, and time/date   Memory:  recent and remote memory grossly intact   Consciousness:  alert and awake    Attention: attention span and concentration were age appropriate   Insight:  fair   Judgment: fair   Gait/Station: In bed   Motor Activity: no abnormal movements     Medications: all current active meds have been reviewed.      Lab Results: I have reviewed the following results:  Most Recent Labs:   Lab Results   Component Value Date    WBC 7.76 10/17/2024    RBC 4.86 10/17/2024    HGB 15.4 10/17/2024    HCT 46.3 10/17/2024     10/17/2024    RDW 13.2 10/17/2024    NEUTROABS 3.67 10/17/2024    SODIUM 138 10/17/2024    K 4.0 10/17/2024     10/17/2024    CO2 28 10/17/2024    BUN 18 10/17/2024    CREATININE 1.07 10/17/2024    GLUC 81 10/17/2024    GLUF 81 10/17/2024    CALCIUM 9.6 10/17/2024    AST 13 10/17/2024    ALT 11 10/17/2024    ALKPHOS 59 10/17/2024    TP 7.1 10/17/2024    ALB 4.7 10/17/2024    TBILI 0.74 10/17/2024    CHOLESTEROL 193 10/17/2024    HDL 50 10/17/2024    TRIG 77 10/17/2024    LDLCALC 128 (H) 10/17/2024    NONHDLC 143 10/17/2024    [id number] 2.868 10/17/2024

## 2024-10-19 NOTE — NURSING NOTE
Pt pleasant during assessment. Pt denies SI/HI/AVH. Pt denies anxiety and depression. Pt reports feeling better since admission. Pt is glad to be back on medication. Pt is OOB for meals and medications. Pt is hopeful for the future. Denies unmet needs at this time.

## 2024-10-19 NOTE — NURSING NOTE
Patient requested and received Trazodone 50 mg PO at 2238, as per PRN order for sleep. Upon follow up, patient appears to be resting peacefully in his bed. Respirations are even and unlabored, and patient appears to be in no apparent distress. Medication appears to be effective.

## 2024-10-20 LAB — VALPROATE SERPL-MCNC: 21 UG/ML (ref 50–100)

## 2024-10-20 PROCEDURE — 99232 SBSQ HOSP IP/OBS MODERATE 35: CPT

## 2024-10-20 PROCEDURE — 80164 ASSAY DIPROPYLACETIC ACD TOT: CPT

## 2024-10-20 RX ORDER — DIVALPROEX SODIUM 500 MG/1
500 TABLET, FILM COATED, EXTENDED RELEASE ORAL 2 TIMES DAILY
Status: DISCONTINUED | OUTPATIENT
Start: 2024-10-20 | End: 2024-10-21

## 2024-10-20 RX ADMIN — HALOPERIDOL 5 MG: 5 TABLET ORAL at 10:35

## 2024-10-20 RX ADMIN — TRAZODONE HYDROCHLORIDE 50 MG: 50 TABLET ORAL at 21:53

## 2024-10-20 RX ADMIN — HYDROXYZINE HYDROCHLORIDE 100 MG: 50 TABLET, FILM COATED ORAL at 10:35

## 2024-10-20 RX ADMIN — OLANZAPINE 5 MG: 5 TABLET, FILM COATED ORAL at 21:03

## 2024-10-20 RX ADMIN — NICOTINE POLACRILEX 4 MG: 4 GUM, CHEWING BUCCAL at 15:53

## 2024-10-20 RX ADMIN — DIVALPROEX SODIUM 500 MG: 500 TABLET, EXTENDED RELEASE ORAL at 17:01

## 2024-10-20 RX ADMIN — NICOTINE POLACRILEX 4 MG: 4 GUM, CHEWING BUCCAL at 21:39

## 2024-10-20 RX ADMIN — DIVALPROEX SODIUM 500 MG: 500 TABLET, EXTENDED RELEASE ORAL at 08:48

## 2024-10-20 RX ADMIN — NICOTINE POLACRILEX 4 MG: 4 GUM, CHEWING BUCCAL at 17:55

## 2024-10-20 RX ADMIN — NICOTINE POLACRILEX 4 MG: 4 GUM, CHEWING BUCCAL at 10:59

## 2024-10-20 NOTE — PROGRESS NOTES
"Progress Note - Behavioral Health   Name: Raudel Bland 34 y.o. male I MRN: [id number]  Unit/Bed#: -01 I Date of Admission: 10/16/2024   Date of Service: 10/20/2024 I Hospital Day: 4    Assessment & Plan  Bipolar disorder (HCC)  Start Depakote  mg BID  Start Zyprexa 5 mg at bedtime  Medical clearance for psychiatric admission  Done  Cannabis use disorder  Encouraged cessation  PTSD (post-traumatic stress disorder)  Psychotherapy    Progress Toward Goals:   Encourage group therapy, milieu therapy and occupational therapy  Behavioral Health checks every 7 minutes  Continue treatment with group therapy, milieu therapy and occupational therapy    Recommended Treatment: Continue with group therapy, milieu therapy and occupational therapy.    Risks, benefits and possible side effects of Medications:   Risks, benefits, and possible side effects of medications explained to patient and patient verbalizes understanding.      History of Present Illness   Behavior over the last 24 hours:  unchanged  Sleep: normal  Appetite: normal  Medication side effects: No  ROS: no complaints and all other systems are negative    Subjective:Raudel was seen today for psychiatric follow-up. Patient calm, cooperative. He remains intermittently visible on the unit for meals. He is mostly withdrawn to his room, in bed isolative to self. He continues to endorse an improving mood, he is controlled on the unit. Patient compliant with his current medication regimen. He denied any SI/HI/AVH. He did not appear internally preoccupied.    Objective   Mental Status Evaluation:  Appearance:  age appropriate and casually dressed   Behavior:  Cooperative, calm   Speech:  normal pitch and normal volume   Mood:  \"alright\"   Affect:  mood-congruent   Thought Process:  goal directed   Associations: intact associations   Thought Content:  ruminations   Perceptual Disturbances: Denied AVH, did not appear internally preoccupied   Risk Potential: " Suicidal Ideations none at present  Homicidal Ideations none at present  Potential for Aggression No   Sensorium:  person, place, and time/date   Memory:  recent and remote memory grossly intact   Consciousness:  alert and awake    Attention: attention span and concentration were age appropriate   Insight:  fair   Judgment: fair   Gait/Station: In bed   Motor Activity: no abnormal movements     Medications: all current active meds have been reviewed.      Lab Results: I have reviewed the following results:  Most Recent Labs:   Lab Results   Component Value Date    WBC 7.76 10/17/2024    RBC 4.86 10/17/2024    HGB 15.4 10/17/2024    HCT 46.3 10/17/2024     10/17/2024    RDW 13.2 10/17/2024    NEUTROABS 3.67 10/17/2024    SODIUM 138 10/17/2024    K 4.0 10/17/2024     10/17/2024    CO2 28 10/17/2024    BUN 18 10/17/2024    CREATININE 1.07 10/17/2024    GLUC 81 10/17/2024    GLUF 81 10/17/2024    CALCIUM 9.6 10/17/2024    AST 13 10/17/2024    ALT 11 10/17/2024    ALKPHOS 59 10/17/2024    TP 7.1 10/17/2024    ALB 4.7 10/17/2024    TBILI 0.74 10/17/2024    CHOLESTEROL 193 10/17/2024    HDL 50 10/17/2024    TRIG 77 10/17/2024    LDLCALC 128 (H) 10/17/2024    NONHDLC 143 10/17/2024    VALPROICTOT 21 (L) 10/20/2024    [id number] 2.868 10/17/2024

## 2024-10-20 NOTE — NURSING NOTE
Patient requested and received Trazodone 50 mg PO at 2210 for c/o insomnia. At 2310, patient appeared to be asleep in bed. Respirations were even and unlabored, and he appeared to be in no apparent distress. Medication appears to be effective at this time.

## 2024-10-20 NOTE — NURSING NOTE
"Patient appeared tense when approached by writer. Stated he was on the phone with his girlfriend earlier, and stated she was accusing him of \"talking to other girls\" while he is here. He stated she told him that she doesn't want him talking to females while he is inpatient. Patient stated the relationship has been a stressor for him, and stated that she may be part of the reason he was manic. He stated he feels that if he breaks up with her, he won't be able to see his daughter anymore. Stated they have been in a relationship for 9 years, and she told him several times she doesn't love him or want to be with him. He stated he feels he is trapped and cannot leave, but has been giving it some thought while here. After the conversation, he requested medication because he was feeling upset and agitated. Haldol 2.5 mg PO and Cogentin 1 mg PO administered at 2019 for moderate agitation. Broset scale score was 1. He denies SI / HI / AVH. He endorsed anxiety. Upon follow up at 2119, patient stated the medication was effective for agitation. He was social and was observed walking the hallway and talking with peers.  "

## 2024-10-20 NOTE — PLAN OF CARE
Problem: Depression  Goal: Treatment Goal: Demonstrate behavioral control of depressive symptoms, verbalize feelings of improved mood/affect, and adopt new coping skills prior to discharge  Outcome: Progressing     Problem: Anxiety  Goal: Anxiety is at manageable level  Description: Interventions:  - Assess and monitor patient's anxiety level.   - Monitor for signs and symptoms (heart palpitations, chest pain, shortness of breath, headaches, nausea, feeling jumpy, restlessness, irritable, apprehensive).   - Collaborate with interdisciplinary team and initiate plan and interventions as ordered.  - [place] patient to unit/surroundings  - Explain treatment plan  - Encourage participation in care  - Encourage verbalization of concerns/fears  - Identify coping mechanisms  - Assist in developing anxiety-reducing skills  - Administer/offer alternative therapies  - Limit or eliminate stimulants  Outcome: Progressing     Problem: Ineffective Coping  Goal: Participates in unit activities  Description: Interventions:  - Provide therapeutic environment   - Provide required programming   - Redirect inappropriate behaviors   Outcome: Not Progressing     Problem: SELF HARM/SUICIDALITY  Goal: Will have no self-injury during hospital stay  Description: INTERVENTIONS:  - Q 15 MINUTES: Routine safety checks  - Q WAKING SHIFT & PRN: Assess risk to determine if routine checks are adequate to maintain patient safety  - Encourage patient to participate actively in care by formulating a plan to combat response to suicidal ideation, identify supports and resources  Outcome: Progressing     Problem: DISCHARGE PLANNING  Goal: Discharge to home or other facility with appropriate resources  Description: INTERVENTIONS:  - Identify barriers to discharge w/patient and caregiver  - Arrange for needed discharge resources and transportation as appropriate  - Identify discharge learning needs (meds, wound care, etc.)  - Arrange for interpretive  services to assist at discharge as needed  - Refer to Case Management Department for coordinating discharge planning if the patient needs post-hospital services based on physician/advanced practitioner order or complex needs related to functional status, cognitive ability, or social support system  Outcome: Progressing

## 2024-10-20 NOTE — NURSING NOTE
Following a phone call with his girlfriend patient reported elevated anxiety and agitation. Patient was tearful, restless, and visibly tense. Patient received Atarax 100mg PO and Haldol 5mg PO at 1035. Patient reports he is going to avoid calling the rest of the day. Patient denies SI/HI/AVH. Following speaking with this writer, patient reports prn medication and talking helped calm him down. Denies any unmet needs.

## 2024-10-20 NOTE — NURSING NOTE
Patient pleasant and cooperative. Patient focused on not calling his girlfriend. Patient adherent with scheduled medications. Denies SI/HI/AVH. Patient reports not feeling ready for discharge due to living situation. Patient remains social with peers. Denies any unmet needs at this time.

## 2024-10-20 NOTE — TREATMENT TEAM
10/20/24 0700   Team Meeting   Meeting Type Daily Rounds   Team Members Present   Team Members Present Physician;Nurse   Physician Team Member Manuel/Oc   Nursing Team Member Renetta   Patient/Family Present   Patient Present No   Patient's Family Present No     AM rounds- denies SI/HI, remains pleasant and calm earlier in the day however did become upset in the evening following phone call with girlfriend. Received prn medications. Slept well.

## 2024-10-21 PROCEDURE — 99232 SBSQ HOSP IP/OBS MODERATE 35: CPT | Performed by: PHYSICIAN ASSISTANT

## 2024-10-21 RX ADMIN — NICOTINE POLACRILEX 4 MG: 4 GUM, CHEWING BUCCAL at 09:58

## 2024-10-21 RX ADMIN — TRAZODONE HYDROCHLORIDE 50 MG: 50 TABLET ORAL at 21:25

## 2024-10-21 RX ADMIN — DIVALPROEX SODIUM 500 MG: 500 TABLET, EXTENDED RELEASE ORAL at 08:53

## 2024-10-21 RX ADMIN — DIVALPROEX SODIUM 750 MG: 250 TABLET, EXTENDED RELEASE ORAL at 21:23

## 2024-10-21 RX ADMIN — HYDROXYZINE HYDROCHLORIDE 50 MG: 50 TABLET, FILM COATED ORAL at 17:07

## 2024-10-21 RX ADMIN — HYDROXYZINE HYDROCHLORIDE 100 MG: 50 TABLET, FILM COATED ORAL at 10:00

## 2024-10-21 RX ADMIN — NICOTINE POLACRILEX 4 MG: 4 GUM, CHEWING BUCCAL at 12:48

## 2024-10-21 RX ADMIN — OLANZAPINE 5 MG: 5 TABLET, FILM COATED ORAL at 21:23

## 2024-10-21 RX ADMIN — NICOTINE POLACRILEX 4 MG: 4 GUM, CHEWING BUCCAL at 18:30

## 2024-10-21 RX ADMIN — NICOTINE POLACRILEX 4 MG: 4 GUM, CHEWING BUCCAL at 14:49

## 2024-10-21 NOTE — PROGRESS NOTES
Treatment Team Rounds Completed  Medical and Psychiatric Review Completed  D/C: Thurs(10/24) tentative / Valproic acid was 21 yesterday, & Depakote was increased.  Labs to be re-checked on Thursday with d/c afterwards.  Pt only has Medicare A (hospital) & will need [facility] funding for outpatient.      10/21/24 0750   Team Meeting   Meeting Type Daily Rounds   Team Members Present   Team Members Present Physician;Nurse;;Other (Discipline and Name)   Physician Team Member Dr. Sharma / OLIVIA Guerrero / OLIVIA Puentes / PA Student   Nursing Team Member Renetta / Theodore   Care Management Team Member Gianna / Mita / Julius / Harrison   Other (Discipline and Name) Rashel(group facilitator)   Patient/Family Present   Patient Present No   Patient's Family Present No

## 2024-10-21 NOTE — NURSING NOTE
Pt in room on approach. Calm, cooperative and pleasant during assessment. Pt remains primarily unchanged from previous assessment; Denies SI/HI/AVH. Endorses moderate anxiety and mild depression r/t to an upsetting phone call made earlier in the day. Pt expresses concern over the potential of not having anywhere to go after discharge; Educated by this writer that CM can assist in finding a place to go should the need arise. Pt verbalized understanding. Plan of care ongoing.

## 2024-10-21 NOTE — PLAN OF CARE
Problem: Depression  Goal: Treatment Goal: Demonstrate behavioral control of depressive symptoms, verbalize feelings of improved mood/affect, and adopt new coping skills prior to discharge  Outcome: Progressing     Problem: Anxiety  Goal: Anxiety is at manageable level  Description: Interventions:  - Assess and monitor patient's anxiety level.   - Monitor for signs and symptoms (heart palpitations, chest pain, shortness of breath, headaches, nausea, feeling jumpy, restlessness, irritable, apprehensive).   - Collaborate with interdisciplinary team and initiate plan and interventions as ordered.  - Ruth patient to unit/surroundings  - Explain treatment plan  - Encourage participation in care  - Encourage verbalization of concerns/fears  - Identify coping mechanisms  - Assist in developing anxiety-reducing skills  - Administer/offer alternative therapies  - Limit or eliminate stimulants  Outcome: Not Progressing     Problem: Ineffective Coping  Goal: Participates in unit activities  Description: Interventions:  - Provide therapeutic environment   - Provide required programming   - Redirect inappropriate behaviors   Outcome: Not Progressing     Problem: SELF HARM/SUICIDALITY  Goal: Will have no self-injury during hospital stay  Description: INTERVENTIONS:  - Q 15 MINUTES: Routine safety checks  - Q WAKING SHIFT & PRN: Assess risk to determine if routine checks are adequate to maintain patient safety  - Encourage patient to participate actively in care by formulating a plan to combat response to suicidal ideation, identify supports and resources  Outcome: Progressing     Problem: DISCHARGE PLANNING  Goal: Discharge to home or other facility with appropriate resources  Description: INTERVENTIONS:  - Identify barriers to discharge w/patient and caregiver  - Arrange for needed discharge resources and transportation as appropriate  - Identify discharge learning needs (meds, wound care, etc.)  - Arrange for interpretive  services to assist at discharge as needed  - Refer to Case Management Department for coordinating discharge planning if the patient needs post-hospital services based on physician/advanced practitioner order or complex needs related to functional status, cognitive ability, or social support system  Outcome: Progressing

## 2024-10-21 NOTE — NURSING NOTE
Patient requested and received Trazodone 50 mg PO for c/o insomnia at 2153. Upon follow up at 2253, patient appears to be sleeping in his bed. Respirations even and unlabored, and he appears to be in no apparent distress. Medication appears to be effective.

## 2024-10-21 NOTE — NURSING NOTE
Patient reported elevated anxiety, requested medication for anxiety. Patient appeared restless on assessment. Patient received Atarax 50mg PO at 1707. Upon reassessment patient socializing with peers appropriately.

## 2024-10-21 NOTE — PROGRESS NOTES
Progress Note - Behavioral Health     Name: Raudel Bland 34 y.o. male I MRN: [id number]   Unit/Bed#: -01 I Date of Admission: 10/16/2024   Date of Service: 10/21/2024 I Hospital Day: 5         Assessment & Plan  Bipolar disorder (HCC)  VPA level 21 on 10/20, dose adjusted to 750mg qhs, repeat level on 10/24 evening  Start Zyprexa 5 mg at bedtime  Medical clearance for psychiatric admission  Done  Cannabis use disorder  Encouraged cessation  PTSD (post-traumatic stress disorder)  Psychotherapy     Principal Problem:    Bipolar disorder (HCC)  Active Problems:    Medical clearance for psychiatric admission    Cannabis use disorder    PTSD (post-traumatic stress disorder)       Recommended Treatment:     Adjust Depakote ER to 750mg qHS. Was tremulous on higher dose. Check Depakote level 10/24.     Planned medication and treatment changes:    All current active medications have been reviewed  Encourage group therapy, milieu therapy and occupational therapy  Behavioral Health checks every 15 minutes  On a 201 commitment status  Medication changes as above.    Current medications:  Current Facility-Administered Medications   Medication Dose Route Frequency Provider Last Rate    aluminum-magnesium hydroxide-simethicone  30 mL Oral Q4H PRN Cathy Garrison MD      haloperidol lactate  2.5 mg Intramuscular Q4H PRN Max 4/day Cathy Garrison MD      And    LORazepam  1 mg Intramuscular Q4H PRN Max 4/day Cathy Garrison MD      And    benztropine  0.5 mg Intramuscular Q4H PRN Max 4/day Cathy Garrison MD      haloperidol lactate  5 mg Intramuscular Q4H PRN Max 4/day Cathy Garrison MD      And    LORazepam  2 mg Intramuscular Q4H PRN Max 4/day Cathy Garrison MD      And    benztropine  1 mg Intramuscular Q4H PRN Max 4/day Cathy Garrison MD      benztropine  1 mg Intramuscular Q4H PRN Max 6/day Cathy Garrison MD      benztropine  1 mg Oral Q4H PRN Max 6/day Cathy Garrison MD      bisacodyl  10 mg  "Rectal Daily PRN Cathy Garrison MD      hydrOXYzine HCL  50 mg Oral Q6H PRN Max 4/day Cathy Garrison MD      Or    diphenhydrAMINE  50 mg Intramuscular Q6H PRN Cathy Garrison MD      divalproex sodium  500 mg Oral BID JAM Yuan      haloperidol  1 mg Oral Q6H PRN Cathy Garrison MD      haloperidol  2.5 mg Oral Q4H PRN Max 4/day Cathy Garrison MD      haloperidol  5 mg Oral Q4H PRN Max 4/day Cathy Garrison MD      hydrOXYzine HCL  100 mg Oral Q6H PRN Max 4/day Cathy Garrison MD      Or    LORazepam  2 mg Intramuscular Q6H PRN Cathy Garrison MD      hydrOXYzine HCL  25 mg Oral Q6H PRN Max 4/day Cathy Garrison MD      ibuprofen  400 mg Oral Q4H PRKARSON Garrison MD      ibuprofen  600 mg Oral Q6H PRKARSON Garrison MD      ibuprofen  800 mg Oral Q8H PRN Cathy Garrison MD      nicotine polacrilex  4 mg Oral Q2H PRN Cathy Garrison MD      OLANZapine  5 mg Oral HS Ivy Montoya MD      polyethylene glycol  17 g Oral Daily PRN Cathy Garrison MD      propranolol  10 mg Oral Q8H PRN Cathy Garrison MD      senna-docusate sodium  1 tablet Oral Daily PRKARSON Garrison MD      traZODone  50 mg Oral HS PRN Cathy Garrison MD         Behavior over the last 24 hours: unchanged.     Raudel is a 34-year-old male with a past medical history of bipolar disorder presenting to the unit for a psychiatric follow-up.  Patient states that he feels better today however has been arguing with his girlfriend.  Patient states he feels as if he needs time to \"figure himself out \".  Patient describes negative alterations in mood which are specific to girlfriend accessing his medical records and then arguing with him on the phone about it. Patient states his sleep has been fine.  Patient states feeling more irritable, worse energy, and difficulty concentrating as of late.  Patient states he feels weak and has tremors on Depakote.  Patient denies hallucinations or " "paranoia. Patient denied suicidal ideation or homicidal ideation.    Sleep: normal  Appetite: normal  Medication side effects: Yes - tremor , weakness  ROS: no complaints    Mental Status Evaluation:    Appearance:  age appropriate, casually dressed, dressed appropriately, marginal hygiene   Behavior:  pleasant, cooperative, restless and fidgety   Speech:  normal rate, normal volume   Mood:  anxious, \"battling with my lady \" somewhat depressed and irritable   Affect:  mood-congruent, less bright   Thought Process:  organized, logical, goal directed, linear   Associations: at times circumstantial   Thought Content:  no overt delusions   Perceptual Disturbances: no auditory hallucinations, no visual hallucinations   Risk Potential: Suicidal ideation - None at present  Homicidal ideation - None at present  Potential for aggression - No   Sensorium:  oriented to person, place, and time/date   Memory:  recent and remote memory grossly intact   Consciousness:  alert and awake   Attention/Concentration: attention span and concentration are age appropriate   Insight:  fair   Judgment: fair   Gait/Station: normal gait/station   Motor Activity: abnormal movement noted: mild tremor present     Vital signs in last 24 hours:    Temp:  [97.2 °F (36.2 °C)-97.3 °F (36.3 °C)] 97.2 °F (36.2 °C)  HR:  [54-72] 54  BP: (109-137)/(70-74) 109/70  Resp:  [18] 18  SpO2:  [100 %] 100 %  O2 Device: None (Room air)    Laboratory results: I have personally reviewed all pertinent laboratory/tests results    Results from the past 24 hours: No results found for this or any previous visit (from the past 24 hour(s)).  Most Recent Labs:   Lab Results   Component Value Date    WBC 7.76 10/17/2024    RBC 4.86 10/17/2024    HGB 15.4 10/17/2024    HCT 46.3 10/17/2024     10/17/2024    RDW 13.2 10/17/2024    NEUTROABS 3.67 10/17/2024    SODIUM 138 10/17/2024    K 4.0 10/17/2024     10/17/2024    CO2 28 10/17/2024    BUN 18 10/17/2024    " CREATININE 1.07 10/17/2024    GLUC 81 10/17/2024    CALCIUM 9.6 10/17/2024    AST 13 10/17/2024    ALT 11 10/17/2024    ALKPHOS 59 10/17/2024    TP 7.1 10/17/2024    ALB 4.7 10/17/2024    TBILI 0.74 10/17/2024    CHOLESTEROL 193 10/17/2024    HDL 50 10/17/2024    TRIG 77 10/17/2024    LDLCALC 128 (H) 10/17/2024    NONHDLC 143 10/17/2024    VALPROICTOT 21 (L) 10/20/2024    DIJ1CVLGKEZH 2.868 10/17/2024    SYPHILISAB Non-reactive 10/17/2024       Progress Toward Goals: progressing slowly    Risks / Benefits of Treatment:    Risks, benefits, and possible side effects of medications explained to patient and patient verbalizes understanding and agreement for treatment.    Counseling / Coordination of Care:    Patient's progress discussed with staff in treatment team meeting.  Medications, treatment progress and treatment plan reviewed with patient.    Aram Guerrero PA-C 10/21/24    This note was constructed with the assistance of network approved dictation software. Please excuse any minor errors of syntax or grammar as a result.

## 2024-10-21 NOTE — ASSESSMENT & PLAN NOTE
VPA level 21 on 10/20, dose adjusted to 750mg qhs, repeat level on 10/24 evening  Start Zyprexa 5 mg at bedtime

## 2024-10-21 NOTE — NURSING NOTE
Patient reported irritation early this morning following a phone call with his girlfriend. Patient reports she accessed his MyChart and was reading his healthcare notes and stating he was not taking his medications. Patient was able to walk and talk with peers and was less irritable. However, patient remained anxious and tremulous and received Atarax 100mg PO at 1000. Upon reassessment patient reported medication was effective. Patient denies SI/HI/AVH. Patient reports he feels his mood has improved since admission. Denies any unmet needs at this time.

## 2024-10-21 NOTE — PLAN OF CARE
Pt attends select groups and unit activities    Problem: Ineffective Coping  Goal: Participates in unit activities  Description: Interventions:  - Provide therapeutic environment   - Provide required programming   - Redirect inappropriate behaviors   Outcome: Progressing

## 2024-10-21 NOTE — CASE MANAGEMENT
CM met with Pt to assist him with calling the [employer] @ [phone] via TEAMS on laptop computer.  After waiting on hold for over 36 minutes, able to speak with representative Mr. Mcpherson.  He access Pt's record & reported there are no current applications for MA on file.  He asked when Pt filed & if he had the efile number, but Pt did not & doesn't have his cell phone with him to be able to try to check emails.  Mr. Friedman recommended having Pt complete a new application.      CM contacted social security @ [phone] & reached a recording that the wait time was over 140 minutes.  CM requested a return call, however, the call will come through [employer] TEAMS & another  needed to use the laptop with their patient.    CM assisted Pt with complete the MA application.

## 2024-10-22 PROCEDURE — 99232 SBSQ HOSP IP/OBS MODERATE 35: CPT | Performed by: PHYSICIAN ASSISTANT

## 2024-10-22 RX ADMIN — NICOTINE POLACRILEX 4 MG: 4 GUM, CHEWING BUCCAL at 14:08

## 2024-10-22 RX ADMIN — TRAZODONE HYDROCHLORIDE 50 MG: 50 TABLET ORAL at 21:47

## 2024-10-22 RX ADMIN — NICOTINE POLACRILEX 4 MG: 4 GUM, CHEWING BUCCAL at 21:47

## 2024-10-22 RX ADMIN — DIVALPROEX SODIUM 750 MG: 250 TABLET, EXTENDED RELEASE ORAL at 21:47

## 2024-10-22 RX ADMIN — NICOTINE POLACRILEX 4 MG: 4 GUM, CHEWING BUCCAL at 17:02

## 2024-10-22 RX ADMIN — OLANZAPINE 5 MG: 5 TABLET, FILM COATED ORAL at 21:47

## 2024-10-22 RX ADMIN — NICOTINE POLACRILEX 4 MG: 4 GUM, CHEWING BUCCAL at 18:54

## 2024-10-22 RX ADMIN — NICOTINE POLACRILEX 4 MG: 4 GUM, CHEWING BUCCAL at 08:53

## 2024-10-22 NOTE — PROGRESS NOTES
Progress Note - Behavioral Health     Name: Raudel Bland 34 y.o. male I MRN: [id number]   Unit/Bed#: -01 I Date of Admission: 10/16/2024   Date of Service: 10/22/2024 I Hospital Day: 6         Assessment & Plan  Bipolar disorder (HCC)  VPA level 21 on 10/20, dose adjusted to 750mg qhs, repeat level on 10/24 evening  Zyprexa 5 mg at bedtime  Medical clearance for psychiatric admission  Done  Cannabis use disorder  Encouraged cessation  PTSD (post-traumatic stress disorder)  Psychotherapy     Principal Problem:    Bipolar disorder (HCC)  Active Problems:    Medical clearance for psychiatric admission    Cannabis use disorder    PTSD (post-traumatic stress disorder)       Recommended Treatment:     Continue current medication regimen. Check Depakote on 10/24.    Planned medication and treatment changes:    All current active medications have been reviewed  Encourage group therapy, milieu therapy and occupational therapy  Behavioral Health checks every 15 minutes  On a 201 commitment status  Continue current medications:    Current medications:  Current Facility-Administered Medications   Medication Dose Route Frequency Provider Last Rate    aluminum-magnesium hydroxide-simethicone  30 mL Oral Q4H PRN Cathy Garrison MD      haloperidol lactate  2.5 mg Intramuscular Q4H PRN Max 4/day Cathy Garrison MD      And    LORazepam  1 mg Intramuscular Q4H PRN Max 4/day Cathy Garrison MD      And    benztropine  0.5 mg Intramuscular Q4H PRN Max 4/day Cathy Garrison MD      haloperidol lactate  5 mg Intramuscular Q4H PRN Max 4/day Cathy Garrison MD      And    LORazepam  2 mg Intramuscular Q4H PRN Max 4/day Cathy Garrison MD      And    benztropine  1 mg Intramuscular Q4H PRN Max 4/day Cathy Garrison MD      benztropine  1 mg Intramuscular Q4H PRN Max 6/day Cathy Garrison MD      benztropine  1 mg Oral Q4H PRN Max 6/day Cathy Garrison MD      bisacodyl  10 mg Rectal Daily PRN Cathy Garrison  MD      hydrOXYzine HCL  50 mg Oral Q6H PRN Max 4/day Cathy Garrison MD      Or    diphenhydrAMINE  50 mg Intramuscular Q6H PRN Cathy Garrison MD      divalproex sodium  750 mg Oral HS Aram Guerrero PA-C      haloperidol  1 mg Oral Q6H PRN Cathy Garrison MD      haloperidol  2.5 mg Oral Q4H PRN Max 4/day Cathy Garrison MD      haloperidol  5 mg Oral Q4H PRN Max 4/day Cathy Garrison MD      hydrOXYzine HCL  100 mg Oral Q6H PRN Max 4/day Cathy Garrison MD      Or    LORazepam  2 mg Intramuscular Q6H PRN Cathy Garrison MD      hydrOXYzine HCL  25 mg Oral Q6H PRN Max 4/day Cathy Garrison MD      ibuprofen  400 mg Oral Q4H PRN Cathy Garrison MD      ibuprofen  600 mg Oral Q6H PRN Cathy Garrison MD      ibuprofen  800 mg Oral Q8H PRN Cathy Garrison MD      nicotine polacrilex  4 mg Oral Q2H PRN Cathy Garrison MD      OLANZapine  5 mg Oral HS Ivy Montoya MD      polyethylene glycol  17 g Oral Daily PRN Cathy Garrison MD      propranolol  10 mg Oral Q8H PRN Cathy Garrison MD      senna-docusate sodium  1 tablet Oral Daily PRN Cathy Garrison MD      traZODone  50 mg Oral HS PRN Cathy Garrison MD         Behavior over the last 24 hours: improved.     Raudel is a 34-year-old male with a past psychiatric history of bipolar disorder presenting to the unit for psychiatric follow-up.  Patient states he feels happy today.  Patient says that him and his girlfriend have decided to take a break form their relationship.  Patient states this has made him feel as if a weight has been lifted off his shoulders.  Patient states his medications are working well and he has noticed no tremor or weakness that he had previously.  Patient denies any new side effects to medications.  Patient reports good sleep, increased energy, and better concentration today.  Patient denies distractibility or racing thoughts. Patient states that once he leaves the unit he looks forward to  "getting a job and pursuing other interests.  Patient denies any hallucinations or paranoia.  Patient denies suicidal ideation or homicidal ideation.    Sleep: slept better  Appetite: normal  Medication side effects: No   ROS: no complaints    Mental Status Evaluation:    Appearance:  age appropriate, casually dressed, adequate grooming   Behavior:  cooperative, calm, very pleasant   Speech:  normal rate and volume   Mood:  improved\"Everything's eldon\"   Affect:  brighter, mood-congruent   Thought Process:  logical, goal directed, linear   Associations: intact associations   Thought Content:  no overt delusions   Perceptual Disturbances: no auditory hallucinations, no visual hallucinations   Risk Potential: Suicidal ideation - None at present  Homicidal ideation - None at present  Potential for aggression - No   Sensorium:  oriented to person, place, and time/date   Memory:  recent and remote memory grossly intact   Consciousness:  alert and awake   Attention/Concentration: attention span and concentration are age appropriate   Insight:  fair   Judgment: fair   Gait/Station: normal gait/station   Motor Activity: no abnormal movements     Vital signs in last 24 hours:    Temp:  [96.9 °F (36.1 °C)-97.9 °F (36.6 °C)] 96.9 °F (36.1 °C)  HR:  [55-88] 55  BP: (110-140)/(64-86) 110/64  Resp:  [14-18] 14  SpO2:  [99 %-100 %] 99 %  O2 Device: None (Room air)    Laboratory results: I have personally reviewed all pertinent laboratory/tests results    Results from the past 24 hours: No results found for this or any previous visit (from the past 24 hour(s)).  Most Recent Labs:   Lab Results   Component Value Date    WBC 7.76 10/17/2024    RBC 4.86 10/17/2024    HGB 15.4 10/17/2024    HCT 46.3 10/17/2024     10/17/2024    RDW 13.2 10/17/2024    NEUTROABS 3.67 10/17/2024    SODIUM 138 10/17/2024    K 4.0 10/17/2024     10/17/2024    CO2 28 10/17/2024    BUN 18 10/17/2024    CREATININE 1.07 10/17/2024    GLUC 81 " 10/17/2024    CALCIUM 9.6 10/17/2024    AST 13 10/17/2024    ALT 11 10/17/2024    ALKPHOS 59 10/17/2024    TP 7.1 10/17/2024    ALB 4.7 10/17/2024    TBILI 0.74 10/17/2024    CHOLESTEROL 193 10/17/2024    HDL 50 10/17/2024    TRIG 77 10/17/2024    LDLCALC 128 (H) 10/17/2024    NONHDLC 143 10/17/2024    VALPROICTOT 21 (L) 10/20/2024    [id number] 2.868 10/17/2024    SYPHILISAB Non-reactive 10/17/2024       Progress Toward Goals: progressing, attends groups, participates in milieu therapy, mood is stabilizing    Risks / Benefits of Treatment:    Risks, benefits, and possible side effects of medications explained to patient and patient verbalizes understanding and agreement for treatment.    Counseling / Coordination of Care:    Patient's progress discussed with staff in treatment team meeting.  Medications, treatment progress and treatment plan reviewed with patient.    Aram Guerrero PA-C 10/22/24    This note was constructed with the assistance of network approved dictation software. Please excuse any minor errors of syntax or grammar as a result.

## 2024-10-22 NOTE — ASSESSMENT & PLAN NOTE
VPA level 21 on 10/20, dose adjusted to 750mg qhs, repeat level on 10/24 evening  Zyprexa 5 mg at bedtime

## 2024-10-22 NOTE — NURSING NOTE
Patient at nursing station requesting trazodone for sleep. Upon re-assessment, pt resting in bed with eyes closed without signs or symptoms of distress. Prn appears effective.

## 2024-10-22 NOTE — NURSING NOTE
Patient pleasant cooperative, denies SI HI AVH at present. Reports that when he woke up he felt really good, Patient reports looking forward to getting a job, and seeing his daughter. At present time no complaints of anxiety or depression

## 2024-10-22 NOTE — PROGRESS NOTES
10/22/24 0915   Activity/Group Checklist   Group Impromptu group (Comment)  (Discussion with BUTLER/L and peers about general coping skills, the importance of understanding other people's perspectives and our own in order to communicate effectively.)   Attendance Attended   Attendance Duration (min) 0-15   Interactions Interacted appropriately   Affect/Mood Appropriate;Bright   Goals Achieved Identified feelings;Identified triggers;Discussed coping strategies;Able to listen to others;Able to engage in interactions;Able to reflect/comment on own behavior;Able to recieve feedback;Able to give feedback to another

## 2024-10-22 NOTE — NURSING NOTE
Pt ambulating in hallway on approach. Calm, cooperative and pleasant during interaction. Pt remains unchanged from previous assessment; Denies SI/HI/AVH. Pt reports looking forward to discharge. Plan of care ongoing. Pt denies any unmet needs or concerns at this time.

## 2024-10-22 NOTE — PLAN OF CARE
Problem: Depression  Goal: Treatment Goal: Demonstrate behavioral control of depressive symptoms, verbalize feelings of improved mood/affect, and adopt new coping skills prior to discharge  Outcome: Progressing     Problem: Anxiety  Goal: Anxiety is at manageable level  Description: Interventions:  - Assess and monitor patient's anxiety level.   - Monitor for signs and symptoms (heart palpitations, chest pain, shortness of breath, headaches, nausea, feeling jumpy, restlessness, irritable, apprehensive).   - Collaborate with interdisciplinary team and initiate plan and interventions as ordered.  - Mayer patient to unit/surroundings  - Explain treatment plan  - Encourage participation in care  - Encourage verbalization of concerns/fears  - Identify coping mechanisms  - Assist in developing anxiety-reducing skills  - Administer/offer alternative therapies  - Limit or eliminate stimulants  Outcome: Progressing     Problem: SELF HARM/SUICIDALITY  Goal: Will have no self-injury during hospital stay  Description: INTERVENTIONS:  - Q 15 MINUTES: Routine safety checks  - Q WAKING SHIFT & PRN: Assess risk to determine if routine checks are adequate to maintain patient safety  - Encourage patient to participate actively in care by formulating a plan to combat response to suicidal ideation, identify supports and resources  Outcome: Progressing

## 2024-10-23 PROBLEM — Z00.8 MEDICAL CLEARANCE FOR PSYCHIATRIC ADMISSION: Status: RESOLVED | Noted: 2023-06-18 | Resolved: 2024-10-23

## 2024-10-23 PROCEDURE — 99232 SBSQ HOSP IP/OBS MODERATE 35: CPT | Performed by: PHYSICIAN ASSISTANT

## 2024-10-23 RX ADMIN — OLANZAPINE 5 MG: 5 TABLET, FILM COATED ORAL at 21:22

## 2024-10-23 RX ADMIN — NICOTINE POLACRILEX 4 MG: 4 GUM, CHEWING BUCCAL at 15:05

## 2024-10-23 RX ADMIN — NICOTINE POLACRILEX 4 MG: 4 GUM, CHEWING BUCCAL at 08:59

## 2024-10-23 RX ADMIN — NICOTINE POLACRILEX 4 MG: 4 GUM, CHEWING BUCCAL at 21:46

## 2024-10-23 RX ADMIN — TRAZODONE HYDROCHLORIDE 50 MG: 50 TABLET ORAL at 21:22

## 2024-10-23 RX ADMIN — NICOTINE POLACRILEX 4 MG: 4 GUM, CHEWING BUCCAL at 12:22

## 2024-10-23 RX ADMIN — NICOTINE POLACRILEX 4 MG: 4 GUM, CHEWING BUCCAL at 17:38

## 2024-10-23 RX ADMIN — DIVALPROEX SODIUM 750 MG: 250 TABLET, EXTENDED RELEASE ORAL at 21:22

## 2024-10-23 NOTE — DISCHARGE SUMMARY
"Discharge Summary - Behavioral Health   Raudel Bland 34 y.o. male MRN: [id number]  Unit/Bed#: -02 Encounter: [id number]     Admission Date: 10/16/2024         Discharge Date: 10/28/24    Attending Psychiatrist: Ivy Andrea*    Assessment & Plan  Bipolar disorder (HCC)  VPA level 21 on 10/20, dose adjusted to 750mg qhs, repeat level on 10/24 was 51 and therapeutic  Zyprexa 5 mg at bedtime  Cannabis use disorder  Encouraged cessation  PTSD (post-traumatic stress disorder)  Psychotherapy       Reason for Admission/HPI:     Per HPI from admission H&P obtained by Dr. Montoya on 10/17/24:    \"Per Crisis worker on 10/16:\"34 y.o male patient presented to the ED with a history of bipolar, depression, ADHD, ODD and PTSD. Pt reported he has been off his medications for about 2 months due to not being able afford the medications. Pt stated he and his family has noticed him having more manic behaviors and becoming easily frustrated. Pt had manic behaviors during the assessment of rapid speech. Pt has been depressed due to the stressors in his life of not being able to find employment, financial instability, and marital issues. Pt denies any SI but does feel hopeless at times when he is turned down for employment and feels he is letting his family down. Pt has had previous SI in the past to overdose. Pt denies HI and AVH. Pt denies any legal issues. Pt has past substance abuse issues. Pt has past inpatient hospitalization in 2023. Pt has no outpatient Psychiatry and Therapy. Pt is willing to sign a 201 for inpatient mental health treatment. Provider is in agreement with this treatment plan. \"     This is 34-year-old male with history of depression/bipolar disorder and substance use admitted to inpatient unit on voluntary status for worsening of mood and passive death wishes in the context of psychosocial stressors and noncompliance with treatment.  Patient reports that he was not able to afford his " "medications which led to noncompliance and decompensation.  Patient endorses depressed mood, anhedonia, hopelessness, low self-esteem, lack of motivation and passive death wishes.  Denies any thoughts or intent or plan.  Sleep and appetite are poor. he also reports periods of high energy, irritability, racing thoughts and impulsivity.  Endorses mild paranoia at times.  Denies any hallucinations.  Patient appears tearful, depressed, labile and dramatic.\"      Social History       Tobacco History       Smoking Status  Every Day Current Packs/Day  0.5 packs/day Smoking Tobacco Type  Cigarettes   Pack Year History     Packs/Day From To Years    0.5   0.0      Smokeless Tobacco Use  Never              Alcohol History       Alcohol Use Status  Yes Drinks/Week  1 Cans of beer per week Amount  1.0 standard drink of alcohol/wk Comment  social drinking 1-2 beers              Drug Use       Drug Use Status  Not Currently Types  Marijuana, Methamphetamines Comment  last use july 2024              Sexual Activity       Sexually Active  Yes Partners  Female              Activities of Daily Living    Not Asked                 Additional Substance Use Detail       Questions Responses    Problems Due to Past Use of Alcohol? No    Problems Due to Past Use of Substances? Yes    Substance Use Assessment Substance use within the past 12 months    Alcohol Use Frequency Experimented    Cannabis frequency Daily    Comment:  Daily on 6/17/2023     Heroin Frequency Past abuse    Cannabis method Smoke    Comment:  Smoke on 6/17/2023     Heroin Method Snort    Cocaine frequency Never used    Comment:  Never used on 6/17/2023     Crack Cocaine Frequency Denies use in past 12 months    Methamphetamine Frequency Past abuse    Methamphetamine Method Snort    Methamphetamine Last Use & Amount 6/16/23    Narcotic Frequency Denies use in past 12 months    Benzodiazepine Frequency Denies use in past 12 months    Amphetamine frequency Denies use in " past 12 months    Barbituate Frequency Denies use use in past 12 months    Inhalant frequency Never used    Comment:  Never used on 6/17/2023     Hallucinogen frequency Never used    Comment:  Never used on 6/17/2023     Ecstasy frequency Never used    Comment:  Never used on 6/17/2023     Other drug frequency Never used    Comment:  Never used on 6/17/2023     Opiate frequency Denies use in past 12 months    Last reviewed by Ludivina Combs RN on 10/16/2024            Past Medical History:   Diagnosis Date    Bipolar 1 disorder (HCC)     Manic depression (HCC)      No past surgical history on file.    Medications:    All current active medications have been reviewed.  Medications prior to admission:    Prior to Admission Medications   Prescriptions Last Dose Informant Patient Reported? Taking?   OLANZapine (ZyPREXA) 7.5 mg tablet Not Taking  No No   Sig: Take 1 tablet (7.5 mg total) by mouth daily at bedtime   Patient not taking: Reported on 10/16/2024   citalopram (CeleXA) 10 mg tablet Not Taking  No No   Sig: Take 1 tablet (10 mg total) by mouth daily   Patient not taking: Reported on 10/16/2024      Facility-Administered Medications: None       Allergies:     Allergies   Allergen Reactions    Penicillins Anaphylaxis       Objective     Vital signs in last 24 hours:    Temp:  [97.1 °F (36.2 °C)-98.5 °F (36.9 °C)] 97.1 °F (36.2 °C)  HR:  [72-85] 73  BP: (105-136)/(71-95) 105/71  Resp:  [17-18] 17  SpO2:  [98 %-99 %] 98 %  O2 Device: None (Room air)    No intake or output data in the 24 hours ending 10/28/24 0746      Hospital Course:     Raudel was admitted to the inpatient psychiatric unit and started on Behavioral Health checks for safety monitoring. During the hospitalization he was encouraged to attend individual therapy, group therapy, milieu therapy and occupational therapy.    Psychiatric medications were adjusted over the hospital stay. To address depressive symptoms, mood instability, irritability,  racing thoughts, impulsivity, and paranoid ideation, Raudel was treated with mood stabilizer Depakote ER and antipsychotic medication Zyprexa. Medication doses were adjusted during the hospital course. Depakote ER was added and titrated to 750mg qhs . On that dose of Depakote ER, the VPA level was 51 on 10/24/24 and deemed clinically therapeutic. Zyprexa was added at the dose of 5mg qhs . Prior to beginning of treatment medications risks and benefits and possible side effects including risk of liver impairment related to treatment with Depakote and risk of parkinsonian symptoms, Tardive Dyskinesia and metabolic syndrome related to treatment with antipsychotic medications were reviewed with Raudel. He verbalized understanding and agreement for treatment. Upon admission Raudel was seen by medical service for medical clearance for inpatient treatment and medical follow up.    Raudel's symptoms gradually improved over the hospital course. Initially after admission he was still feeling depressed, irritable, and paranoid. With adjustment of medications and therapeutic milieu his symptoms gradually resolved. At the end of treatment Raudel was doing much better. His mood was significantly improved at the time of discharge. Raudel denied suicidal ideation, intent or plan at the time of discharge and denied homicidal ideation, intent or plan at the time of discharge. There was no overt psychosis at the time of discharge. Paranoid ideation was resolved. Raudel was participating appropriately in milieu at the time of discharge. Behavior was appropriate on the unit at the time of discharge. Sleep and appetite were improved. Raudel was tolerating medications and was not reporting any significant side effects at the time of discharge.    Since Raudel was doing well at the end of the hospitalization, treatment team felt that he could be safely discharged to outpatient care. We felt that at the end of the hospital stay Raudel was at  "baseline and was ready for discharge. Raudel also felt stable and ready for discharge at the end of the hospital stay.    The outpatient follow up with  [facility]  was arranged by the unit  upon discharge.    Mental Status at Time of Discharge:     Appearance: casually dressed, appears consistent with stated age, normal grooming  Motor: no psychomotor disturbances, no gait abnormalities  Behavior: pleasant, calm, cooperative, interacts with this writer appropriately  Speech: normal rate, rhythm, and volume  Mood: \"good\" no longer manic  Affect: more appropriate, reactive, no longer manic appearing, mood-congruent  Thought Process: organized, linear, and goal-oriented; intact associations  Thought Content: denies any delusional material, no preoccupation  Perception: denies any auditory or visual hallucinations, denies other perceptual disturbances  Risk Potential: denies suicidal ideation, plan, or intent. Denies homicidal ideation  Sensorium: Oriented to person, place, time, and situation  Cognition: cognitive ability appears intact but was not quantitatively tested  Consciousness: alert and awake  Attention/Concentration: shorter than expected for age  Insight: improved  Judgement: improved    Admission Diagnosis:    Principal Problem:    Bipolar disorder (HCC)  Active Problems:    Cannabis use disorder    PTSD (post-traumatic stress disorder)      Discharge Diagnosis:     Principal Problem:    Bipolar disorder (HCC)  Active Problems:    Cannabis use disorder    PTSD (post-traumatic stress disorder)  Resolved Problems:    Medical clearance for psychiatric admission      Lab Results: I have personally reviewed all pertinent laboratory/tests results.  Most Recent Labs:   Lab Results   Component Value Date    WBC 9.47 10/24/2024    RBC 4.63 10/24/2024    HGB 14.6 10/24/2024    HCT 44.3 10/24/2024     10/24/2024    RDW 12.8 10/24/2024    NEUTROABS 5.07 10/24/2024    SODIUM 140 " 10/24/2024    K 4.0 10/24/2024     10/24/2024    CO2 32 10/24/2024    BUN 25 10/24/2024    CREATININE 1.08 10/24/2024    GLUC 100 10/24/2024    GLUF 81 10/17/2024    CALCIUM 9.5 10/24/2024    AST 16 10/24/2024    ALT 15 10/24/2024    ALKPHOS 73 10/24/2024    TP 7.1 10/24/2024    ALB 4.8 10/24/2024    TBILI 0.21 10/24/2024    CHOLESTEROL 193 10/17/2024    HDL 50 10/17/2024    TRIG 77 10/17/2024    LDLCALC 128 (H) 10/17/2024    NONHDLC 143 10/17/2024    VALPROICTOT 51 10/24/2024    [id number] 2.868 10/17/2024       Discharge Medications:    See after visit summary for all reconciled discharge medications provided to patient and family.      Discharge instructions/Information to patient and family:     See after visit summary for information provided to patient and family.      Provisions for Follow-Up Care:    See after visit summary for information related to follow-up care and any pertinent home health orders.      Discharge Statement:    I spent 38 minutes discharging the patient. This time was spent on the day of discharge. I had direct contact with the patient on the day of discharge.     Additional documentation is required if more than 30 minutes were spent on discharge:    I reviewed with Raudel importance of compliance with medications and outpatient treatment after discharge.  I discussed the medication regimen and possible side effects of the medications with Raudel prior to discharge. At the time of discharge he was tolerating psychiatric medications.  I discussed outpatient follow up with Raudel.  I reviewed with Raudel crisis plan and safety plan upon discharge.  Raudel was competent to understand risks and benefits of withholding information and risks and benefits of his actions.    Discharge on Two Antipsychotic Medications : Jessica Guerrero PA-C 10/28/24      This note was constructed with the assistance of network approved dictation software. Please excuse any minor errors of syntax or  grammar as a result.

## 2024-10-23 NOTE — NURSING NOTE
Pt was calm, pleasant and cooperative. Observed pacing the halls interacting with staff and peers appropriately. Attends groups. Denied current SI/HI/AVH and thoughts of self harm. Medication compliant. Expressed feeling relieved and also stressed due to break up with girlfriend, but is looking forward discharge. Reports sleeping well and a good appetite. Denied unmet needs at this time.

## 2024-10-23 NOTE — ED TRIAGE NOTES
Attempt to triage pt  Pt on phone in waiting area and told nurse to hold on   Will re attempt to triage pt
Statement Selected

## 2024-10-23 NOTE — DISCHARGE INSTR - OTHER ORDERS
You are being provided a 30 day supply of your medications at discharge, along with Text A Cab savings cards to help with future cost.     [facility] is a confidential 7 days/week telephone support service manned by trained mental health consumers.  [facility] operates daily but is not able to accept calls between 2AM-6AM.   [facility] provides support, a listening ear and can provide information about available services. [facility] specializes in the concerns of mental health consumers, their families and friends.  However, we are also here for anyone who has a mental health concern, is confused about or just doesn't know anything about mental health or where to get information. To reach [facility], accepts calls between 6:00 AM to 10:00 AM and from 4:00 PM to 12:00 AM. Effective July 1, 2023, please call [phone].    Text CONNECT to 759-218 from anywhere in the [place], anytime, about any type of crisis.  A live, trained Crisis Counselor receives the text and lets you know that they are here to listen.  The volunteer Crisis Counselor will help you move from a hot moment to a cool moment.    [facility] - licensed telephone and mobile crisis services that provide mental health assessments to all age groups regardless of income or insurance.  Crisis Intervention operates 24-hour/7 days a week. [facility] assists consumer who are experiencing a mental health emergency and lack the resources to assist themselves.  Immediate intervention for suicidal and depressed individuals with home visits/outreach being top priority. Crisis can be contacted at [phone].     The National [place] [employer] Mental Illness (HENNA) offers various education & support groups for you & your family.  For more information visit their website at http://www.henna-lv.org/.    Dial 2-1-1 to get connected/get help.  Free, confidential information & referral available 24/7: Aging Services, Child & Youth Services,  Counseling, Education/Training, Food/Shelter/Clothing, Health Services, Parenting, Substance Abuse, Support Groups, Volunteer Opportunities, & much more.  Phone: 2-1-1 or 439-171-1059, Web: www.[employer].VNG, Email: 513@[facility]  The [facility] ([address] [place] [id number]) offers persons with a mental illness a safe, healing environment to explore their personal and vocational potential and receive support in achieving their goals. Instead of traditional therapy, the work of the house is the rehabilitation. As members contribute meaningful work to the house, they build confidence and a sense of purpose.  Be a Member - It's Free  Membership in the [facility] is open to any [facility] resident who is 18 or older and has a history of mental illness. Membership is free for life.  [address] [address]  Hours: Monday - Friday: 8 a.m. - 4 p.m.   With varying hours on holidays   [phone]    Drop-In Center  The Drop[facility]In [facility] ([address] [place] [address]) provides a stress-free atmosphere for persons 18 and older who have experienced mental health issues.  What The Drop-In Center Offers  Activities  Games  Outings  Holiday gatherings  Recovery  Employment  Education  Community Resources  Empowerment  Helps participants achieve their goals  Enhances quality of life  Encourages leadership    The Drop-In [address]  Hours: Monday through Friday: 4 p.m. - 9 p.m.  Saturday: 2 p.m. - 8 p.m.  Closed Sundays  Varying hours on holidays            Contact [phone]    Support & Referral Helpline  Support and Referral Helpline is a 24-hour, 7 days-a-week, listening and referral service provided to [facility].  Please call [phone] or tty [phone] to speak with one of our specialists.  The helpline is possible through  partnerships with the [place] Department of Human Services and [place] for Community Resources.      The 988 Suicide & Crisis Lifeline (formerly known as the National Suicide Prevention Lifeline) provides free and confidential emotional support to people in suicidal crisis or emotional distress 24 hours a day, 7 days a week, across the [place]. The Lifeline is comprised of a national network of over 200 local crisis centers, combining custom local care and resources with national standards and best practices.

## 2024-10-23 NOTE — ASSESSMENT & PLAN NOTE
VPA level 21 on 10/20, dose adjusted to 750mg qhs, repeat level on 10/24 was 51 and therapeutic  Zyprexa 5 mg at bedtime

## 2024-10-23 NOTE — NURSING NOTE
"Patient has been pleasant, calm and cooperative. Looking forward to discharging and hopeful about the future. Stated he is looking forward to getting his own apartment and having his \"own money back\". Stated he was giving his girlfriend his entire paycheck and \"didn't feel like a man\" not having any money. Denies SI / HI / AVH. Denies self harm thoughts. Denies depression, but states he is feeling a little anxiety regarding discharge. Social with peers and attending groups.   "

## 2024-10-23 NOTE — PROGRESS NOTES
Treatment Team Rounds Completed  Medical and Psychiatric Review Completed  D/C: Friday (10/25) / CM & Pt called [facility] & they have no application on file for him.  CM assisted him with completing a new application & it will be faxed today.      10/22/24 0750   Team Meeting   Meeting Type Daily Rounds   Team Members Present   Team Members Present Physician;Nurse;;Other (Discipline and Name)   Physician Team Member Dr. Sharma / Dr. Samayoa / OLIVIA Guerrero / [place] Student   Nursing Team Member Manuel / Theodore   Care Management Team Member Gianna / Mita / Julius / Harrison   Other (Discipline and Name) Rashel(group facilitator)   Patient/Family Present   Patient Present No   Patient's Family Present No

## 2024-10-23 NOTE — NURSING NOTE
Pt ambulating in hallway on approach. Calm, cooperative and pleasant during interaction. Pt reports having plan to work on becoming a  after discharge which he says keeps him motivated. Pt reports feeling better overall this afternoon; Continues to deny SI/HI/AVH. Medication compliant. Plan of care ongoing. Pt denies any unmet needs or concerns at this time.

## 2024-10-23 NOTE — PROGRESS NOTES
Progress Note - Behavioral Health     Name: Raudel Bland 34 y.o. male I MRN: [id number]   Unit/Bed#: -01 I Date of Admission: 10/16/2024   Date of Service: 10/23/2024 I Hospital Day: 7         Assessment & Plan  Bipolar disorder (HCC)  VPA level 21 on 10/20, dose adjusted to 750mg qhs, repeat level on 10/24 evening  Zyprexa 5 mg at bedtime  Medical clearance for psychiatric admission  Done  Cannabis use disorder  Encouraged cessation  PTSD (post-traumatic stress disorder)  Psychotherapy     Principal Problem:    Bipolar disorder (HCC)  Active Problems:    Medical clearance for psychiatric admission    Cannabis use disorder    PTSD (post-traumatic stress disorder)       Recommended Treatment:     Planned medication and treatment changes:    All current active medications have been reviewed  Encourage group therapy, milieu therapy and occupational therapy  Behavioral Health checks every 15 minutes  On a 201 commitment status    Continue current medications.  Updated VPA level ordered for tomorrow evening, likely discharge Friday morning.    No overt gianna, but does have some hypomanic features this week.    Current medications:  Current Facility-Administered Medications   Medication Dose Route Frequency Provider Last Rate    aluminum-magnesium hydroxide-simethicone  30 mL Oral Q4H PRN Cathy Garrison MD      haloperidol lactate  2.5 mg Intramuscular Q4H PRN Max 4/day Cathy Garrison MD      And    LORazepam  1 mg Intramuscular Q4H PRN Max 4/day Cathy Garrison MD      And    benztropine  0.5 mg Intramuscular Q4H PRN Max 4/day Cathy Garrison MD      haloperidol lactate  5 mg Intramuscular Q4H PRN Max 4/day Cathy Garrison MD      And    LORazepam  2 mg Intramuscular Q4H PRN Max 4/day Cathy Garrison MD      And    benztropine  1 mg Intramuscular Q4H PRN Max 4/day Cathy Garrison MD      benztropine  1 mg Intramuscular Q4H PRN Max 6/day Cathy Garrison MD      benztropine  1 mg Oral Q4H PRN  Max 6/day Cathy Garrison MD      bisacodyl  10 mg Rectal Daily PRN Cathy Garrison MD      hydrOXYzine HCL  50 mg Oral Q6H PRN Max 4/day Cathy Garrison MD      Or    diphenhydrAMINE  50 mg Intramuscular Q6H PRN Cathy Garrison MD      divalproex sodium  750 mg Oral HS Aram Guerrero PA-C      haloperidol  1 mg Oral Q6H PRN Cathy Garrison MD      haloperidol  2.5 mg Oral Q4H PRN Max 4/day Cathy Garrison MD      haloperidol  5 mg Oral Q4H PRN Max 4/day Cathy Garrison MD      hydrOXYzine HCL  100 mg Oral Q6H PRN Max 4/day Cathy Garrison MD      Or    LORazepam  2 mg Intramuscular Q6H PRN Cathy Garrison MD      hydrOXYzine HCL  25 mg Oral Q6H PRN Max 4/day Cathy Garrison MD      ibuprofen  400 mg Oral Q4H PRN Cathy Garrison MD      ibuprofen  600 mg Oral Q6H PRN Cathy Garrison MD      ibuprofen  800 mg Oral Q8H PRN Cathy Garrison MD      nicotine polacrilex  4 mg Oral Q2H PRN Cathy Garrison MD      OLANZapine  5 mg Oral HS Ivy Montoya MD      polyethylene glycol  17 g Oral Daily PRN Cathy Garrison MD      propranolol  10 mg Oral Q8H PRKARSON Garrison MD      senna-docusate sodium  1 tablet Oral Daily PRN Cathy Garrison MD      traZODone  50 mg Oral HS PRN Cathy Garrison MD         Behavior over the last 24 hours: slowly improving.     Raudel is a 34-year-old male with a history of bipolar disorder who presents for psychiatric follow-up.  Staff reports no behavioral issues overnight.  He is pleasant but somewhat over bright appearing and expansive upon approach.  He is hyperverbal but his speech is not rapid or pressured.  His responses are very circumstantial, requiring redirection.  He talks about bizarre dreams that he is having, a spiritual connection to his daughter, and numerous goal oriented activities for when he is discharged from the hospital.  Despite this, he offers improving insight into his psychiatric condition and feels that his  "medications are both necessary and helpful.  He plans on continuing medicines and attending follow-ups in the outpatient setting upon eventual discharge. He is sleeping well and feels rested upon waking. No SI or HI.  No AH or VH.    Sleep: normal  Appetite: normal  Medication side effects: No   ROS: no complaints, all other systems are negative    Mental Status Evaluation:    Appearance: Improved grooming, appears consistent with stated age  Motor: No psychomotor abnormalities, no gait abnormalities  Behavior: Pleasant, calm, cooperative  Speech: Hypertalkative but not rapid or pressured  Mood: \"I feel great!\"  Affect: expansive, over bright, mood congruent   Thought Process: Circumstantial requiring redirection, but mostly logical  Thought Content: denies auditory hallucinations, denies visual hallucinations, denies delusions  Risk Potential: denies suicidal ideation, plan, or intent. Denies homicidal ideation  Sensorium: Oriented to person, place, time, and situation  Cognition: cognitive ability appears intact but was not quantitatively tested  Consciousness: alert and awake  Attention: [place] than expected for age  Insight: Improving  Judgement: Fair    Vital signs in last 24 hours:    Temp:  [96.7 °F (35.9 °C)-97.9 °F (36.6 °C)] 96.7 °F (35.9 °C)  HR:  [67-79] 67  BP: (125-136)/(70-74) 125/70  Resp:  [18] 18  SpO2:  [98 %-99 %] 98 %  O2 Device: None (Room air)    Laboratory results: I have personally reviewed all pertinent laboratory/tests results    Results from the past 24 hours: No results found for this or any previous visit (from the past 24 hour(s)).  Most Recent Labs:   Lab Results   Component Value Date    WBC 7.76 10/17/2024    RBC 4.86 10/17/2024    HGB 15.4 10/17/2024    HCT 46.3 10/17/2024     10/17/2024    RDW 13.2 10/17/2024    NEUTROABS 3.67 10/17/2024    SODIUM 138 10/17/2024    K 4.0 10/17/2024     10/17/2024    CO2 28 10/17/2024    BUN 18 10/17/2024    CREATININE 1.07 10/17/2024 "    GLUC 81 10/17/2024    CALCIUM 9.6 10/17/2024    AST 13 10/17/2024    ALT 11 10/17/2024    ALKPHOS 59 10/17/2024    TP 7.1 10/17/2024    ALB 4.7 10/17/2024    TBILI 0.74 10/17/2024    CHOLESTEROL 193 10/17/2024    HDL 50 10/17/2024    TRIG 77 10/17/2024    LDLCALC 128 (H) 10/17/2024    NONHDLC 143 10/17/2024    VALPROICTOT 21 (L) 10/20/2024    [id number] 2.868 10/17/2024    SYPHILISAB Non-reactive 10/17/2024       Progress Toward Goals: progressing    Risks / Benefits of Treatment:    Risks, benefits, and possible side effects of medications explained to patient and patient verbalizes understanding and agreement for treatment.    Counseling / Coordination of Care:    Patient's progress discussed with staff in treatment team meeting.  Medications, treatment progress and treatment plan reviewed with patient.    Aram Guerrero PA-C 10/23/24    This note was constructed with the assistance of network approved dictation software. Please excuse any minor errors of syntax or grammar as a result.

## 2024-10-23 NOTE — PLAN OF CARE
Problem: Depression  Goal: Treatment Goal: Demonstrate behavioral control of depressive symptoms, verbalize feelings of improved mood/affect, and adopt new coping skills prior to discharge  Outcome: Progressing     Problem: Anxiety  Goal: Anxiety is at manageable level  Description: Interventions:  - Assess and monitor patient's anxiety level.   - Monitor for signs and symptoms (heart palpitations, chest pain, shortness of breath, headaches, nausea, feeling jumpy, restlessness, irritable, apprehensive).   - Collaborate with interdisciplinary team and initiate plan and interventions as ordered.  - [place] patient to unit/surroundings  - Explain treatment plan  - Encourage participation in care  - Encourage verbalization of concerns/fears  - Identify coping mechanisms  - Assist in developing anxiety-reducing skills  - Administer/offer alternative therapies  - Limit or eliminate stimulants  Outcome: Progressing     Problem: SELF HARM/SUICIDALITY  Goal: Will have no self-injury during hospital stay  Description: INTERVENTIONS:  - Q 15 MINUTES: Routine safety checks  - Q WAKING SHIFT & PRN: Assess risk to determine if routine checks are adequate to maintain patient safety  - Encourage patient to participate actively in care by formulating a plan to combat response to suicidal ideation, identify supports and resources  Outcome: Progressing

## 2024-10-23 NOTE — DISCHARGE INSTR - APPOINTMENTS
Behavioral Health Nurse Navigator, Sabiha or Cindy will be calling you after your discharge, on the phone number that you provided.  They will be available as an additional support, if needed.  If you wish to speak with Sabiha, you may contact her at [phone].    You are being discharged to [address] [place] [address].  You provided cell phone number [phone] as the best way to contact you.

## 2024-10-23 NOTE — BH TRANSITION RECORD
Contact Information: If you have any questions, concerns, pended studies, tests and/or procedures, or emergencies regarding your inpatient behavioral health visit. Please contact [place] behavioral [facility] [phone] and ask to speak to a , nurse or physician. A contact is available 24 hours/ 7 days a week at this number.     Summary of Procedures Performed During your Stay:  Below is a list of major procedures performed during your hospital stay and a summary of results:  - Cardiac Procedures/Studies: EKG - sinus bradycardia.    Pending Studies (From admission, onward)      None          Please follow up on the above pending studies with your PCP and/or referring provider.

## 2024-10-23 NOTE — NURSING NOTE
Pt approached nurses' station with request for PRN medication for sleep; Trazodone 50 mg PO administered as ordered. Upon follow up, pt observed resting calmly in bed; PRN medication appears effective.

## 2024-10-23 NOTE — PROGRESS NOTES
Treatment Team Rounds Completed  Medical and Psychiatric Review Completed  D/C: Friday / Pt only has Medicare A & will need [facility] funding for outpatient.  Pt will also need a 30 day supply of medication at discharge.  CM did fax Pt's MA application to [employer].  Attempts to contact social security were not successful, wait time over 140 minutes when called on Monday.      10/23/24 0750   Team Meeting   Meeting Type Daily Rounds   Team Members Present   Team Members Present Physician;Nurse;;Other (Discipline and Name)   Physician Team Member Dr. Sharma / OLIVIA Guerrero / OLIVIA Puentes / PA Student   Nursing Team Member Manuel / Theodore   Care Management Team Member Gianna / Mita / Julius   Other (Discipline and Name) Rashel(group facilitator) / Student   Patient/Family Present   Patient Present No   Patient's Family Present No

## 2024-10-24 LAB
ALBUMIN SERPL BCG-MCNC: 4.8 G/DL (ref 3.5–5)
ALP SERPL-CCNC: 73 U/L (ref 34–104)
ALT SERPL W P-5'-P-CCNC: 15 U/L (ref 7–52)
ANION GAP SERPL CALCULATED.3IONS-SCNC: 6 MMOL/L (ref 4–13)
AST SERPL W P-5'-P-CCNC: 16 U/L (ref 13–39)
BASOPHILS # BLD AUTO: 0.06 THOUSANDS/ΜL (ref 0–0.1)
BASOPHILS NFR BLD AUTO: 1 % (ref 0–1)
BILIRUB SERPL-MCNC: 0.21 MG/DL (ref 0.2–1)
BUN SERPL-MCNC: 25 MG/DL (ref 5–25)
CALCIUM SERPL-MCNC: 9.5 MG/DL (ref 8.4–10.2)
CHLORIDE SERPL-SCNC: 102 MMOL/L (ref 96–108)
CO2 SERPL-SCNC: 32 MMOL/L (ref 21–32)
CREAT SERPL-MCNC: 1.08 MG/DL (ref 0.6–1.3)
EOSINOPHIL # BLD AUTO: 0.22 THOUSAND/ΜL (ref 0–0.61)
EOSINOPHIL NFR BLD AUTO: 2 % (ref 0–6)
ERYTHROCYTE [DISTWIDTH] IN BLOOD BY AUTOMATED COUNT: 12.8 % (ref 11.6–15.1)
GFR SERPL CREATININE-BSD FRML MDRD: 89 ML/MIN/1.73SQ M
GLUCOSE SERPL-MCNC: 100 MG/DL (ref 65–140)
HCT VFR BLD AUTO: 44.3 % (ref 36.5–49.3)
HGB BLD-MCNC: 14.6 G/DL (ref 12–17)
IMM GRANULOCYTES # BLD AUTO: 0.02 THOUSAND/UL (ref 0–0.2)
IMM GRANULOCYTES NFR BLD AUTO: 0 % (ref 0–2)
LYMPHOCYTES # BLD AUTO: 3.17 THOUSANDS/ΜL (ref 0.6–4.47)
LYMPHOCYTES NFR BLD AUTO: 34 % (ref 14–44)
MCH RBC QN AUTO: 31.5 PG (ref 26.8–34.3)
MCHC RBC AUTO-ENTMCNC: 33 G/DL (ref 31.4–37.4)
MCV RBC AUTO: 96 FL (ref 82–98)
MONOCYTES # BLD AUTO: 0.93 THOUSAND/ΜL (ref 0.17–1.22)
MONOCYTES NFR BLD AUTO: 10 % (ref 4–12)
NEUTROPHILS # BLD AUTO: 5.07 THOUSANDS/ΜL (ref 1.85–7.62)
NEUTS SEG NFR BLD AUTO: 53 % (ref 43–75)
NRBC BLD AUTO-RTO: 0 /100 WBCS
PLATELET # BLD AUTO: 252 THOUSANDS/UL (ref 149–390)
PMV BLD AUTO: 11 FL (ref 8.9–12.7)
POTASSIUM SERPL-SCNC: 4 MMOL/L (ref 3.5–5.3)
PROT SERPL-MCNC: 7.1 G/DL (ref 6.4–8.4)
RBC # BLD AUTO: 4.63 MILLION/UL (ref 3.88–5.62)
SODIUM SERPL-SCNC: 140 MMOL/L (ref 135–147)
VALPROATE SERPL-MCNC: 51 UG/ML (ref 50–100)
WBC # BLD AUTO: 9.47 THOUSAND/UL (ref 4.31–10.16)

## 2024-10-24 PROCEDURE — 99232 SBSQ HOSP IP/OBS MODERATE 35: CPT | Performed by: PHYSICIAN ASSISTANT

## 2024-10-24 PROCEDURE — 80053 COMPREHEN METABOLIC PANEL: CPT | Performed by: PHYSICIAN ASSISTANT

## 2024-10-24 PROCEDURE — 85025 COMPLETE CBC W/AUTO DIFF WBC: CPT | Performed by: PHYSICIAN ASSISTANT

## 2024-10-24 PROCEDURE — 80164 ASSAY DIPROPYLACETIC ACD TOT: CPT | Performed by: PHYSICIAN ASSISTANT

## 2024-10-24 RX ORDER — OLANZAPINE 5 MG/1
5 TABLET ORAL
Qty: 30 TABLET | Refills: 0 | Status: SHIPPED | OUTPATIENT
Start: 2024-10-24 | End: 2024-10-25

## 2024-10-24 RX ORDER — DIVALPROEX SODIUM 250 MG/1
750 TABLET, FILM COATED, EXTENDED RELEASE ORAL
Qty: 90 TABLET | Refills: 0 | Status: SHIPPED | OUTPATIENT
Start: 2024-10-24 | End: 2024-10-25

## 2024-10-24 RX ADMIN — NICOTINE POLACRILEX 4 MG: 4 GUM, CHEWING BUCCAL at 11:25

## 2024-10-24 RX ADMIN — PROPRANOLOL HYDROCHLORIDE 10 MG: 10 TABLET ORAL at 11:22

## 2024-10-24 RX ADMIN — DIVALPROEX SODIUM 750 MG: 250 TABLET, EXTENDED RELEASE ORAL at 21:19

## 2024-10-24 RX ADMIN — HYDROXYZINE HYDROCHLORIDE 50 MG: 50 TABLET, FILM COATED ORAL at 08:46

## 2024-10-24 RX ADMIN — NICOTINE POLACRILEX 4 MG: 4 GUM, CHEWING BUCCAL at 19:37

## 2024-10-24 RX ADMIN — OLANZAPINE 5 MG: 5 TABLET, FILM COATED ORAL at 21:19

## 2024-10-24 RX ADMIN — NICOTINE POLACRILEX 4 MG: 4 GUM, CHEWING BUCCAL at 15:22

## 2024-10-24 RX ADMIN — TRAZODONE HYDROCHLORIDE 50 MG: 50 TABLET ORAL at 21:19

## 2024-10-24 RX ADMIN — NICOTINE POLACRILEX 4 MG: 4 GUM, CHEWING BUCCAL at 08:17

## 2024-10-24 NOTE — CASE MANAGEMENT
CM met with Pt to review tentative d/c plans for Friday.  Pt confirmed he has a friend he can stay with, temporarily.  CM reviewed she would assist Pt with calling [facility] tomorrow to complete the screening for [facility] funded outpatient.  CM inquired what part of [place] he would be staying, so that she can see which [facility] funded providers are nearby; Pt reported around [place].  CM provided Pt with the phone number for ROSAR[facility] Barnes ([phone]) if he wanted to stop his girlfriend from accessing his MyChart.  CM also provided Pt with the phone number to the [facility] so that he can call & confirm his application was received ([phone]).  Pt reported that he would probably follow-up in-person after d/c.  Pt reported he plans to go to Social Security in-person as well.  CM reviewed she had requested a 30 day supply of Pt's medication as well.  Pt said that he may need transportation on Friday, but he would let CM know for sure tomorrow, as he planned to make more phone calls tonight.  Pt has no questions about d/c plans & expressed appreciation.

## 2024-10-24 NOTE — NURSING NOTE
Patient is visible on the unit.  He has been anxious throughout most of the day and has received PRN medications twice.  These were effective upon reassessment.  He is noted to be walking the halls and interacting well with his peers.  Patient attended 1 group this shift.  Medication and meal complaint.  Safety precautions maintained.

## 2024-10-24 NOTE — NURSING NOTE
Patient requested medication to help facilitate sleep. Patient was administered PRN Trazodone 50mg tablet PO for insomnia at 2122.    Patient was later observed on unit cooperative and voiced no additional complaints. Patient settled in bedroom in preparation for sleep/rest.

## 2024-10-24 NOTE — PROGRESS NOTES
Progress Note - Behavioral Health     Name: Raudel Bland 34 y.o. male I MRN: [id number]   Unit/Bed#: -02 I Date of Admission: 10/16/2024   Date of Service: 10/24/2024 I Hospital Day: 8         Assessment & Plan  Bipolar disorder (HCC)  VPA level 21 on 10/20, dose adjusted to 750mg qhs, repeat level on 10/24 evening  Zyprexa 5 mg at bedtime  Cannabis use disorder  Encouraged cessation  PTSD (post-traumatic stress disorder)  Psychotherapy     Principal Problem:    Bipolar disorder (HCC)  Active Problems:    Cannabis use disorder    PTSD (post-traumatic stress disorder)       Recommended Treatment:     Planned medication and treatment changes:    All current active medications have been reviewed  Encourage group therapy, milieu therapy and occupational therapy  Behavioral Health checks every 15 minutes  On a 201 commitment status    Continue current medications.  VPA level ordered for this evening, will adjust accordingly.  Patient no longer feeling safe for discharge tomorrow due to lack of sufficient housing.    Current medications:  Current Facility-Administered Medications   Medication Dose Route Frequency Provider Last Rate    aluminum-magnesium hydroxide-simethicone  30 mL Oral Q4H PRN Cathy Garrison MD      haloperidol lactate  2.5 mg Intramuscular Q4H PRN Max 4/day Cathy Garrison MD      And    LORazepam  1 mg Intramuscular Q4H PRN Max 4/day Cathy Garrison MD      And    benztropine  0.5 mg Intramuscular Q4H PRN Max 4/day Cathy Garrison MD      haloperidol lactate  5 mg Intramuscular Q4H PRN Max 4/day Cathy Grarison MD      And    LORazepam  2 mg Intramuscular Q4H PRN Max 4/day Cathy Garrison MD      And    benztropine  1 mg Intramuscular Q4H PRN Max 4/day Cathy Garrison MD      benztropine  1 mg Intramuscular Q4H PRN Max 6/day Cathy Garrison MD      benztropine  1 mg Oral Q4H PRN Max 6/day Cathy Garrison MD      bisacodyl  10 mg Rectal Daily PRN Cathy Garrison MD       hydrOXYzine HCL  50 mg Oral Q6H PRN Max 4/day Cathy Garrison MD      Or    diphenhydrAMINE  50 mg Intramuscular Q6H PRN Cathy Garrison MD      divalproex sodium  750 mg Oral HS Aram Guerrero PA-C      haloperidol  1 mg Oral Q6H PRN Cathy Garrison MD      haloperidol  2.5 mg Oral Q4H PRN Max 4/day Cathy Garrison MD      haloperidol  5 mg Oral Q4H PRN Max 4/day Cathy Garrison MD      hydrOXYzine HCL  100 mg Oral Q6H PRN Max 4/day Cathy Garrison MD      Or    LORazepam  2 mg Intramuscular Q6H PRN Cathy Garrison MD      hydrOXYzine HCL  25 mg Oral Q6H PRN Max 4/day Cathy Garrison MD      ibuprofen  400 mg Oral Q4H PRN Cathy Garrison MD      ibuprofen  600 mg Oral Q6H PRN Cathy Garrison MD      ibuprofen  800 mg Oral Q8H PRN Cathy Garrison MD      nicotine polacrilex  4 mg Oral Q2H PRN Cathy Garrison MD      OLANZapine  5 mg Oral HS Ivy Montoya MD      polyethylene glycol  17 g Oral Daily PRN Cathy Garrison MD      propranolol  10 mg Oral Q8H PRN Cathy Garrison MD      senna-docusate sodium  1 tablet Oral Daily PRN Cathy Garrison MD      traZODone  50 mg Oral HS PRN Cathy Garrison MD         Behavior over the last 24 hours: unchanged.     Raudel is a 34-year-old male with a history of bipolar disorder who presents for psychiatric follow-up.  Staff reports the patient was labile in the evening and anxious this morning requiring PRNs.  He states that he had a panic attack because his plan for housing fell through and he does not wish to be discharged to the streets.  He does remain expansive, at times irritable and is endorsing racing thoughts.  Speech is still hyperverbal but not rapid or pressured.  He reports that he is sleeping well.  He is tolerating medications.  He feels as though his Depakote may need to be adjusted depending on his VPA level this evening.  Denies any SI or HI but would not feel safe if discharged tomorrow.  Denies any AH or  "VH.    Sleep: normal  Appetite: normal  Medication side effects: No   ROS: no complaints, all other systems are negative    Mental Status Evaluation:    Appearance: Normal grooming, appears consistent with stated age  Motor: No psychomotor abnormalities, no gait abnormalities  Behavior: calm, cooperative  Speech: Hypertalkative but not rapid or pressured  Mood: \"I am upset\" anxious, dysphoric  Affect: expansive   Thought Process: Circumstantial requiring redirection, but mostly logical  Thought Content: denies auditory hallucinations, denies visual hallucinations, denies delusions  Risk Potential: denies suicidal ideation, plan, or intent but would not feel safe if discharged. Denies homicidal ideation  Sensorium: Oriented to person, place, time, and situation  Cognition: cognitive ability appears intact but was not quantitatively tested  Consciousness: alert and awake  Attention: [place] than expected for age  Insight: Fair  Judgement: Fair    Vital signs in last 24 hours:    Temp:  [98.3 °F (36.8 °C)] 98.3 °F (36.8 °C)  HR:  [78-98] 78  BP: (110-145)/(62-84) 110/62  Resp:  [18-20] 20  O2 Device: None (Room air)    Laboratory results: I have personally reviewed all pertinent laboratory/tests results    Results from the past 24 hours: No results found for this or any previous visit (from the past 24 hour(s)).  Most Recent Labs:   Lab Results   Component Value Date    WBC 7.76 10/17/2024    RBC 4.86 10/17/2024    HGB 15.4 10/17/2024    HCT 46.3 10/17/2024     10/17/2024    RDW 13.2 10/17/2024    NEUTROABS 3.67 10/17/2024    SODIUM 138 10/17/2024    K 4.0 10/17/2024     10/17/2024    CO2 28 10/17/2024    BUN 18 10/17/2024    CREATININE 1.07 10/17/2024    GLUC 81 10/17/2024    CALCIUM 9.6 10/17/2024    AST 13 10/17/2024    ALT 11 10/17/2024    ALKPHOS 59 10/17/2024    TP 7.1 10/17/2024    ALB 4.7 10/17/2024    TBILI 0.74 10/17/2024    CHOLESTEROL 193 10/17/2024    HDL 50 10/17/2024    TRIG 77 10/17/2024    " LDLCALC 128 (H) 10/17/2024    NONHDLC 143 10/17/2024    VALPROICTOT 21 (L) 10/20/2024    [id number] 2.868 10/17/2024    SYPHILISAB Non-reactive 10/17/2024       Progress Toward Goals: slightly regressed    Risks / Benefits of Treatment:    Risks, benefits, and possible side effects of medications explained to patient and patient verbalizes understanding and agreement for treatment.    Counseling / Coordination of Care:    Patient's progress discussed with staff in treatment team meeting.  Medications, treatment progress and treatment plan reviewed with patient.    Aram Guerrero PA-C 10/24/24    This note was constructed with the assistance of network approved dictation software. Please excuse any minor errors of syntax or grammar as a result.

## 2024-10-24 NOTE — NURSING NOTE
Patient reports that the PRN inderal he was given was effective.  He reports feeling less restless and can concentrate better.

## 2024-10-24 NOTE — NURSING NOTE
"Patient approached this writer with complaints of feeling anxious.  He reports he is fearful of being discharged because of being homeless and not having any support system.  Patient states \"I feel like I'm crawling out of my skin\".  HAS 20, 50 mg PO atarax given.  Will reassess in 1 hour for effectiveness.  "

## 2024-10-24 NOTE — CASE MANAGEMENT
CM notified by advanced practitioner that d/c is postponed until Mon, for further medication changes.      CM printed information on the following shelters/programs that accept individuals on [place]:  [facility] ([facility]), [facility]([place]), [facility]([place]), [facility]([place]), & [facility]([place]).   MERCEDES met with Pt & gave him this printed information.  Pt reported plans to continue to make phone calls & also look over these programs as well.      MERCEDES contacted Leeann [employer] @ [phone] & used the future to receive a return call.  MERCEDES spoke with  Ky, whom CM & Pt had spoken with together on Monday.  He was able to confirm Pt's application was received on 10/22.  If there are any further questions, Pt will need to call back himself.

## 2024-10-24 NOTE — PROGRESS NOTES
Treatment Team Rounds Completed  Medical and Psychiatric Review Completed  D/C: Friday (10/25)? / Pt reported last evening that his plans to stay with a friend in [place] fell threw.  Pt somewhat hyperverbal & treatment team discussed possible further medication adjustments & postponing discharge.       10/24/24 0840   Team Meeting   Meeting Type Daily Rounds   Team Members Present   Team Members Present Physician;Nurse;;Other (Discipline and Name)   Physician Team Member Dr. Sharma / OLIVIA Guerrero / OLIVIA Puentes / PA Student   Nursing Team Member Renetta / Theodore   Care Management Team Member Gianna / Mita / Julius   Other (Discipline and Name) Rashel(group facilitator)   Patient/Family Present   Patient Present No   Patient's Family Present No

## 2024-10-24 NOTE — NURSING NOTE
"Pt pleasant during assessment. Pt reports ongoing depression and anxiety. Pt reports improvement since admission. \"I'm feeling a lot better.\" Pt denies SI/HI/AVH. Pt is visible on the unit and attends most groups. Pt is social with peers. Denies unmet needs at this time.   "

## 2024-10-24 NOTE — PLAN OF CARE
Problem: Depression  Goal: Treatment Goal: Demonstrate behavioral control of depressive symptoms, verbalize feelings of improved mood/affect, and adopt new coping skills prior to discharge  Outcome: Progressing     Problem: Anxiety  Goal: Anxiety is at manageable level  Description: Interventions:  - Assess and monitor patient's anxiety level.   - Monitor for signs and symptoms (heart palpitations, chest pain, shortness of breath, headaches, nausea, feeling jumpy, restlessness, irritable, apprehensive).   - Collaborate with interdisciplinary team and initiate plan and interventions as ordered.  - [place] patient to unit/surroundings  - Explain treatment plan  - Encourage participation in care  - Encourage verbalization of concerns/fears  - Identify coping mechanisms  - Assist in developing anxiety-reducing skills  - Administer/offer alternative therapies  - Limit or eliminate stimulants  Outcome: Progressing     Problem: SELF HARM/SUICIDALITY  Goal: Will have no self-injury during hospital stay  Description: INTERVENTIONS:  - Q 15 MINUTES: Routine safety checks  - Q WAKING SHIFT & PRN: Assess risk to determine if routine checks are adequate to maintain patient safety  - Encourage patient to participate actively in care by formulating a plan to combat response to suicidal ideation, identify supports and resources  Outcome: Progressing

## 2024-10-24 NOTE — PLAN OF CARE
Pt regularly attends groups and other unit activities    Problem: Ineffective Coping  Goal: Participates in unit activities  Description: Interventions:  - Provide therapeutic environment   - Provide required programming   - Redirect inappropriate behaviors   Outcome: Progressing      none

## 2024-10-24 NOTE — NURSING NOTE
Patient reports that he feels better after having the atarax.  He reports that if he had to rate it and 0 was no anxiety and 4 is the worst. He would rate it a 1

## 2024-10-24 NOTE — NURSING NOTE
Patient approached this writer asking if he can have the atarax again for anxiety.  This writer did explain that the order is for every 6 hours and it has not been 6 hrs yet.  Patient does report feeling restless therefore, propanolol 10 mg PO given. /62 and HR 78.  Will reassess for effectiveness in 1 hour.

## 2024-10-25 PROCEDURE — 99232 SBSQ HOSP IP/OBS MODERATE 35: CPT | Performed by: PHYSICIAN ASSISTANT

## 2024-10-25 RX ORDER — OLANZAPINE 5 MG/1
5 TABLET ORAL
Qty: 30 TABLET | Refills: 0 | Status: SHIPPED | OUTPATIENT
Start: 2024-10-25 | End: 2024-11-24

## 2024-10-25 RX ORDER — DIVALPROEX SODIUM 250 MG/1
750 TABLET, FILM COATED, EXTENDED RELEASE ORAL
Qty: 90 TABLET | Refills: 0 | Status: SHIPPED | OUTPATIENT
Start: 2024-10-25 | End: 2024-11-24

## 2024-10-25 RX ADMIN — HYDROXYZINE HYDROCHLORIDE 50 MG: 50 TABLET, FILM COATED ORAL at 11:31

## 2024-10-25 RX ADMIN — DIVALPROEX SODIUM 750 MG: 250 TABLET, EXTENDED RELEASE ORAL at 21:44

## 2024-10-25 RX ADMIN — HYDROXYZINE HYDROCHLORIDE 50 MG: 50 TABLET, FILM COATED ORAL at 17:54

## 2024-10-25 RX ADMIN — NICOTINE POLACRILEX 4 MG: 4 GUM, CHEWING BUCCAL at 16:54

## 2024-10-25 RX ADMIN — TRAZODONE HYDROCHLORIDE 50 MG: 50 TABLET ORAL at 21:47

## 2024-10-25 RX ADMIN — NICOTINE POLACRILEX 4 MG: 4 GUM, CHEWING BUCCAL at 08:41

## 2024-10-25 RX ADMIN — OLANZAPINE 5 MG: 5 TABLET, FILM COATED ORAL at 21:44

## 2024-10-25 RX ADMIN — NICOTINE POLACRILEX 4 MG: 4 GUM, CHEWING BUCCAL at 12:53

## 2024-10-25 NOTE — NURSING NOTE
"Patient approached nursing station and requested and received Atarax 50 mg PO at 1754. Barton anxiety scale score was 21. Patient stated he was feeling anxious and said he was feeling upset because \"it's trick or treat night and I miss my 9 year old daughter\". He became tearful and said he's worried about his future - \"there are so many doors and I don't know which one to choose.\" Patient spoke about his relationship that recently ended, but at the end of the conversation said he is hopeful for the future. Denies SI / HI / AVH. Upon follow up, patient is walking and talking with peers in the hallway, and appears to be feeling better.   "

## 2024-10-25 NOTE — ASSESSMENT & PLAN NOTE
VPA level 21 on 10/20, dose adjusted to 750mg qhs, repeat level 51 on 10/24 and therapeutic  Zyprexa 5 mg at bedtime

## 2024-10-25 NOTE — NURSING NOTE
@ 1852 pt requested and received trazodone 50 mg PO for sleep. Upon follow up PRN was not effective. Pt is awake and walking the halls.

## 2024-10-25 NOTE — PROGRESS NOTES
Progress Note - Behavioral Health     Name: Raudel Bland 34 y.o. male I MRN: [id number]   Unit/Bed#: -02 I Date of Admission: 10/16/2024   Date of Service: 10/25/2024 I Hospital Day: 9         Assessment & Plan  Bipolar disorder (HCC)  VPA level 21 on 10/20, dose adjusted to 750mg qhs, repeat level 51 on 10/24 and therapeutic  Zyprexa 5 mg at bedtime  Cannabis use disorder  Encouraged cessation  PTSD (post-traumatic stress disorder)  Psychotherapy     Principal Problem:    Bipolar disorder (HCC)  Active Problems:    Cannabis use disorder    PTSD (post-traumatic stress disorder)       Recommended Treatment:     Planned medication and treatment changes:    All current active medications have been reviewed  Encourage group therapy, milieu therapy and occupational therapy  Behavioral Health checks every 15 minutes  On a 201 commitment status    Continue current medications. No changes anticipated this weekend. VPA level was 51 on Depakote ER 750mg qhs and dose appears therapeutic.    Discharge planning for Monday.    Current medications:  Current Facility-Administered Medications   Medication Dose Route Frequency Provider Last Rate    aluminum-magnesium hydroxide-simethicone  30 mL Oral Q4H PRN Cathy Garrison MD      haloperidol lactate  2.5 mg Intramuscular Q4H PRN Max 4/day Cathy Garrison MD      And    LORazepam  1 mg Intramuscular Q4H PRN Max 4/day Cathy Garrison MD      And    benztropine  0.5 mg Intramuscular Q4H PRN Max 4/day Cathy Garrison MD      haloperidol lactate  5 mg Intramuscular Q4H PRN Max 4/day Cathy Garrison MD      And    LORazepam  2 mg Intramuscular Q4H PRN Max 4/day Cathy Garrison MD      And    benztropine  1 mg Intramuscular Q4H PRN Max 4/day Cathy Garrison MD      benztropine  1 mg Intramuscular Q4H PRN Max 6/day Cathy Garrison MD      benztropine  1 mg Oral Q4H PRN Max 6/day Cathy Garrison MD      bisacodyl  10 mg Rectal Daily PRN Cathy Garrison MD       "hydrOXYzine HCL  50 mg Oral Q6H PRN Max 4/day Cathy Garrison MD      Or    diphenhydrAMINE  50 mg Intramuscular Q6H PRN Cathy Garrison MD      divalproex sodium  750 mg Oral HS Aram Guerrero PA-C      haloperidol  1 mg Oral Q6H PRN Catyh Garrison MD      haloperidol  2.5 mg Oral Q4H PRN Max 4/day Cathy Garrison MD      haloperidol  5 mg Oral Q4H PRN Max 4/day Cathy Garrison MD      hydrOXYzine HCL  100 mg Oral Q6H PRN Max 4/day Cathy Garrison MD      Or    LORazepam  2 mg Intramuscular Q6H PRN Cathy Garrison MD      hydrOXYzine HCL  25 mg Oral Q6H PRN Max 4/day Cathy Garrison MD      ibuprofen  400 mg Oral Q4H PRN Cathy Garrison MD      ibuprofen  600 mg Oral Q6H PRN Cathy Garrison MD      ibuprofen  800 mg Oral Q8H PRN Cathy Garrison MD      nicotine polacrilex  4 mg Oral Q2H PRN Cathy Garrison MD      OLANZapine  5 mg Oral HS Ivy Montoya MD      polyethylene glycol  17 g Oral Daily PRN Cathy Garrison MD      propranolol  10 mg Oral Q8H PRN Cathy Garrison MD      senna-docusate sodium  1 tablet Oral Daily PRN Cathy Garrison MD      traZODone  50 mg Oral HS PRN Cathy Garrison MD         Behavior over the last 24 hours: slowly improving.     Raudel is a 34-year-old male with a history of bipolar disorder who presents for psychiatric follow-up.  Staff reports no behavioral issues overnight.  His VPA level was 51 last evening, CBC and CMP unremarkable.  He is pleasant, calm and cooperative upon approach and has been bright, visible and social in the milieu with active participation in inpatient programming.  He states, \"I feel normal,\" and reports tolerating medications well. Feels improved overall and no longer appears hypomanic. Plans on continuing meds and attending follow ups in the outpatient setting. No SI or HI.  No AH or VH.    Sleep: normal  Appetite: normal  Medication side effects: No   ROS: no complaints, all other systems are " "negative    Mental Status Evaluation:    Appearance: casually dressed, appears consistent with stated age, normal grooming  Motor: no psychomotor disturbances, no gait abnormalities  Behavior: calm, cooperative, interacts with this writer appropriately  Speech: normal rate, rhythm, and volume  Mood: \"good\"  Affect: appropriate, normal range and intensity, mood-congruent  Thought Process: organized, linear, and goal-oriented; intact associations  Thought Content: denies any delusional material, no preoccupation  Perception: denies any auditory or visual hallucinations, denies other perceptual disturbances  Risk Potential: denies suicidal ideation, plan, or intent. Denies homicidal ideation  Sensorium: Oriented to person, place, time, and situation  Cognition: cognitive ability appears intact but was not quantitatively tested  Consciousness: alert and awake  Attention/Concentration: able to focus without difficulty, attention and concentration are age appropriate  Insight: improved  Judgement: improved    Vital signs in last 24 hours:    Temp:  [97.9 °F (36.6 °C)-98.4 °F (36.9 °C)] 97.9 °F (36.6 °C)  HR:  [69-72] 69  BP: (112-118)/(62-67) 118/67  Resp:  [16-18] 16  SpO2:  [97 %-99 %] 99 %  O2 Device: None (Room air)    Laboratory results: I have personally reviewed all pertinent laboratory/tests results    Results from the past 24 hours:   Recent Results (from the past 24 hour(s))   CBC and differential    Collection Time: 10/24/24  6:53 PM   Result Value Ref Range    WBC 9.47 4.31 - 10.16 Thousand/uL    RBC 4.63 3.88 - 5.62 Million/uL    Hemoglobin 14.6 12.0 - 17.0 g/dL    Hematocrit 44.3 36.5 - 49.3 %    MCV 96 82 - 98 fL    MCH 31.5 26.8 - 34.3 pg    MCHC 33.0 31.4 - 37.4 g/dL    RDW 12.8 11.6 - 15.1 %    MPV 11.0 8.9 - 12.7 fL    Platelets 252 149 - 390 Thousands/uL    nRBC 0 /100 WBCs    Segmented % 53 43 - 75 %    Immature Grans % 0 0 - 2 %    Lymphocytes % 34 14 - 44 %    Monocytes % 10 4 - 12 %    Eosinophils " Relative 2 0 - 6 %    Basophils Relative 1 0 - 1 %    Absolute Neutrophils 5.07 1.85 - 7.62 Thousands/µL    Absolute Immature Grans 0.02 0.00 - 0.20 Thousand/uL    Absolute Lymphocytes 3.17 0.60 - 4.47 Thousands/µL    Absolute Monocytes 0.93 0.17 - 1.22 Thousand/µL    Eosinophils Absolute 0.22 0.00 - 0.61 Thousand/µL    Basophils Absolute 0.06 0.00 - 0.10 Thousands/µL   Comprehensive metabolic panel    Collection Time: 10/24/24  6:53 PM   Result Value Ref Range    Sodium 140 135 - 147 mmol/L    Potassium 4.0 3.5 - 5.3 mmol/L    Chloride 102 96 - 108 mmol/L    CO2 32 21 - 32 mmol/L    ANION GAP 6 4 - 13 mmol/L    BUN 25 5 - 25 mg/dL    Creatinine 1.08 0.60 - 1.30 mg/dL    Glucose 100 65 - 140 mg/dL    Calcium 9.5 8.4 - 10.2 mg/dL    AST 16 13 - 39 U/L    ALT 15 7 - 52 U/L    Alkaline Phosphatase 73 34 - 104 U/L    Total Protein 7.1 6.4 - 8.4 g/dL    Albumin 4.8 3.5 - 5.0 g/dL    Total Bilirubin 0.21 0.20 - 1.00 mg/dL    eGFR 89 ml/min/1.73sq m   Valproic acid level, total    Collection Time: 10/24/24  6:53 PM   Result Value Ref Range    Valproic Acid, Total 51 50 - 100 ug/mL     Most Recent Labs:   Lab Results   Component Value Date    WBC 9.47 10/24/2024    RBC 4.63 10/24/2024    HGB 14.6 10/24/2024    HCT 44.3 10/24/2024     10/24/2024    RDW 12.8 10/24/2024    NEUTROABS 5.07 10/24/2024    SODIUM 140 10/24/2024    K 4.0 10/24/2024     10/24/2024    CO2 32 10/24/2024    BUN 25 10/24/2024    CREATININE 1.08 10/24/2024    GLUC 100 10/24/2024    CALCIUM 9.5 10/24/2024    AST 16 10/24/2024    ALT 15 10/24/2024    ALKPHOS 73 10/24/2024    TP 7.1 10/24/2024    ALB 4.8 10/24/2024    TBILI 0.21 10/24/2024    CHOLESTEROL 193 10/17/2024    HDL 50 10/17/2024    TRIG 77 10/17/2024    LDLCALC 128 (H) 10/17/2024    NONHDLC 143 10/17/2024    VALPROICTOT 51 10/24/2024    [id number] 2.868 10/17/2024    SYPHILISAB Non-reactive 10/17/2024       Progress Toward Goals: progressing, working on coping skills, discharge  planning    Risks / Benefits of Treatment:    Risks, benefits, and possible side effects of medications explained to patient and patient verbalizes understanding and agreement for treatment.    Counseling / Coordination of Care:    Patient's progress discussed with staff in treatment team meeting.  Medications, treatment progress and treatment plan reviewed with patient.    Aram Guerrero PA-C 10/25/24    This note was constructed with the assistance of network approved dictation software. Please excuse any minor errors of syntax or grammar as a result.

## 2024-10-25 NOTE — PROGRESS NOTES
Treatment Team Rounds Completed  Medical and Psychiatric Review Completed  D/C: 10/28 Monday (tentative) / Pt denied any SI/HI but still has come anxiety & restlessness.  Pt is working to identify a place he wants discharged to.  Pt was given information on shelters/programs that accept individuals [place] & he reported he would call friends but also look at the information provided.      10/25/24 0750   Team Meeting   Meeting Type Daily Rounds   Team Members Present   Team Members Present Physician;Nurse;   Physician Team Member Dr. Sharma / OLIVIA Guerrero / OLIVIA Puentes / PA Student   Nursing Team Member Renetta / Theodore   Care Management Team Member Gianna / Mita   Patient/Family Present   Patient Present No   Patient's Family Present No

## 2024-10-25 NOTE — NURSING NOTE
"Patient approached RN station c/o due to \"fear of the future and unknown\". @11:31 RN administered Atarax 50 mg PO for moderate anxiety (Barton= 21). Upon follow-up, endorses medication effectiveness.  "

## 2024-10-25 NOTE — NURSING NOTE
Pt is calm and cooperative on approach, resting in bed just prior to afternoon group.  Reports receiving Atarax which was helpful for anxiety and intrusive thoughts.  Pt states mood is improved overall with the help of medications and participation in treatment.  Denies SI/HI/AVH.  Pt discussed an unhealthy relationship which recently ended and largely contributed to decline in mental health.  Pt expresses hopefulness for the future.  Pt OOB at end of discussion to attend group.  Observed social with peers.

## 2024-10-25 NOTE — PLAN OF CARE
Problem: Anxiety  Goal: Anxiety is at manageable level  Description: Interventions:  - Assess and monitor patient's anxiety level.   - Monitor for signs and symptoms (heart palpitations, chest pain, shortness of breath, headaches, nausea, feeling jumpy, restlessness, irritable, apprehensive).   - Collaborate with interdisciplinary team and initiate plan and interventions as ordered.  - [place] patient to unit/surroundings  - Explain treatment plan  - Encourage participation in care  - Encourage verbalization of concerns/fears  - Identify coping mechanisms  - Assist in developing anxiety-reducing skills  - Administer/offer alternative therapies  - Limit or eliminate stimulants  Outcome: Progressing     Problem: Ineffective Coping  Goal: Participates in unit activities  Description: Interventions:  - Provide therapeutic environment   - Provide required programming   - Redirect inappropriate behaviors   Outcome: Progressing     Problem: SELF HARM/SUICIDALITY  Goal: Will have no self-injury during hospital stay  Description: INTERVENTIONS:  - Q 15 MINUTES: Routine safety checks  - Q WAKING SHIFT & PRN: Assess risk to determine if routine checks are adequate to maintain patient safety  - Encourage patient to participate actively in care by formulating a plan to combat response to suicidal ideation, identify supports and resources  Outcome: Progressing     Problem: DISCHARGE PLANNING  Goal: Discharge to home or other facility with appropriate resources  Description: INTERVENTIONS:  - Identify barriers to discharge w/patient and caregiver  - Arrange for needed discharge resources and transportation as appropriate  - Identify discharge learning needs (meds, wound care, etc.)  - Arrange for interpretive services to assist at discharge as needed  - Refer to Case Management Department for coordinating discharge planning if the patient needs post-hospital services based on physician/advanced practitioner order or complex needs  related to functional status, cognitive ability, or social support system  Outcome: Progressing

## 2024-10-26 RX ADMIN — NICOTINE POLACRILEX 4 MG: 4 GUM, CHEWING BUCCAL at 14:18

## 2024-10-26 RX ADMIN — OLANZAPINE 5 MG: 5 TABLET, FILM COATED ORAL at 21:17

## 2024-10-26 RX ADMIN — HYDROXYZINE HYDROCHLORIDE 50 MG: 50 TABLET, FILM COATED ORAL at 17:55

## 2024-10-26 RX ADMIN — NICOTINE POLACRILEX 4 MG: 4 GUM, CHEWING BUCCAL at 17:08

## 2024-10-26 RX ADMIN — TRAZODONE HYDROCHLORIDE 50 MG: 50 TABLET ORAL at 21:17

## 2024-10-26 RX ADMIN — NICOTINE POLACRILEX 4 MG: 4 GUM, CHEWING BUCCAL at 09:37

## 2024-10-26 RX ADMIN — DIVALPROEX SODIUM 750 MG: 250 TABLET, EXTENDED RELEASE ORAL at 21:17

## 2024-10-26 RX ADMIN — NICOTINE POLACRILEX 4 MG: 4 GUM, CHEWING BUCCAL at 21:18

## 2024-10-26 RX ADMIN — HYDROXYZINE HYDROCHLORIDE 50 MG: 50 TABLET, FILM COATED ORAL at 11:52

## 2024-10-26 NOTE — PLAN OF CARE
Problem: Depression  Goal: Treatment Goal: Demonstrate behavioral control of depressive symptoms, verbalize feelings of improved mood/affect, and adopt new coping skills prior to discharge  Outcome: Progressing     Problem: Anxiety  Goal: Anxiety is at manageable level  Description: Interventions:  - Assess and monitor patient's anxiety level.   - Monitor for signs and symptoms (heart palpitations, chest pain, shortness of breath, headaches, nausea, feeling jumpy, restlessness, irritable, apprehensive).   - Collaborate with interdisciplinary team and initiate plan and interventions as ordered.  - [place] patient to unit/surroundings  - Explain treatment plan  - Encourage participation in care  - Encourage verbalization of concerns/fears  - Identify coping mechanisms  - Assist in developing anxiety-reducing skills  - Administer/offer alternative therapies  - Limit or eliminate stimulants  Outcome: Progressing     Problem: SELF HARM/SUICIDALITY  Goal: Will have no self-injury during hospital stay  Description: INTERVENTIONS:  - Q 15 MINUTES: Routine safety checks  - Q WAKING SHIFT & PRN: Assess risk to determine if routine checks are adequate to maintain patient safety  - Encourage patient to participate actively in care by formulating a plan to combat response to suicidal ideation, identify supports and resources  Outcome: Progressing

## 2024-10-26 NOTE — NURSING NOTE
Patient reported elevated anxiety and received Atarax 50mg PO at 1152. Upon reassessment patient reported medication was effective.

## 2024-10-26 NOTE — NURSING NOTE
"Patient pleasant and cooperative. Patient denies anxiety or depression, stating \"I'm good.\" Denies SI/HI/AVH. Patient observed socializing with peers this afternoon. Denies any unmet needs at this time.   "

## 2024-10-26 NOTE — NURSING NOTE
Patient reported elevated anxiety this afternoon. Patient could not identify a specific trigger. Patient received Atarax 50mg PO at 1755. Patient reported medication was effective.

## 2024-10-26 NOTE — PROGRESS NOTES
Progress Note - Behavioral Health     Name: Raudel Bland 34 y.o. male I MRN: [id number]   Unit/Bed#: -02 I Date of Admission: 10/16/2024   Date of Service: 10/26/2024 I Hospital Day: 10         Assessment & Plan  Bipolar disorder (HCC)  VPA level 21 on 10/20, dose adjusted to 750mg qhs, repeat level 51 on 10/24 and therapeutic  Zyprexa 5 mg at bedtime  Cannabis use disorder  Encouraged cessation  PTSD (post-traumatic stress disorder)  Psychotherapy     Principal Problem:    Bipolar disorder (HCC)  Active Problems:    Cannabis use disorder    PTSD (post-traumatic stress disorder)       Recommended Treatment:     Planned medication and treatment changes:    All current active medications have been reviewed  Encourage group therapy, milieu therapy and occupational therapy  Behavioral Health checks every 15 minutes  On a 201 commitment status    Continue current medications.  Discharge planning for Monday.  Appears to be at psychiatric baseline.    Current medications:  Current Facility-Administered Medications   Medication Dose Route Frequency Provider Last Rate    aluminum-magnesium hydroxide-simethicone  30 mL Oral Q4H PRN Cathy Garrison MD      haloperidol lactate  2.5 mg Intramuscular Q4H PRN Max 4/day Cathy Garrison MD      And    LORazepam  1 mg Intramuscular Q4H PRN Max 4/day Cathy Garrison MD      And    benztropine  0.5 mg Intramuscular Q4H PRN Max 4/day Cathy Garrison MD      haloperidol lactate  5 mg Intramuscular Q4H PRN Max 4/day Cathy Garrison MD      And    LORazepam  2 mg Intramuscular Q4H PRN Max 4/day Cathy Garrison MD      And    benztropine  1 mg Intramuscular Q4H PRN Max 4/day Cathy Garrison MD      benztropine  1 mg Intramuscular Q4H PRN Max 6/day Cathy Garrison MD      benztropine  1 mg Oral Q4H PRN Max 6/day Cathy Garrison MD      bisacodyl  10 mg Rectal Daily PRN Cathy Garrison MD      hydrOXYzine HCL  50 mg Oral Q6H PRN Max 4/day Cathy Garrison MD    "   Or    diphenhydrAMINE  50 mg Intramuscular Q6H PRN Cathy Garrison MD      divalproex sodium  750 mg Oral HS Aram Guerrero PA-C      haloperidol  1 mg Oral Q6H PRN Cathy Garrison MD      haloperidol  2.5 mg Oral Q4H PRN Max 4/day Cathy Garrison MD      haloperidol  5 mg Oral Q4H PRN Max 4/day Cathy Garrison MD      hydrOXYzine HCL  100 mg Oral Q6H PRN Max 4/day Cathy Garrison MD      Or    LORazepam  2 mg Intramuscular Q6H PRN Cathy Garrison MD      hydrOXYzine HCL  25 mg Oral Q6H PRN Max 4/day Cathy Garrison MD      ibuprofen  400 mg Oral Q4H PRN Cathy Garrison MD      ibuprofen  600 mg Oral Q6H PRN Cathy Garrison MD      ibuprofen  800 mg Oral Q8H PRN Cathy Garrison MD      nicotine polacrilex  4 mg Oral Q2H PRN Cathy Garrison MD      OLANZapine  5 mg Oral HS Ivy Montoya MD      polyethylene glycol  17 g Oral Daily PRN Cathy Garrison MD      propranolol  10 mg Oral Q8H PRN Cathy Garrison MD      senna-docusate sodium  1 tablet Oral Daily PRN Cathy Garrison MD      traZODone  50 mg Oral HS PRN Cathy Garrison MD         Behavior over the last 24 hours: unchanged.     Raudel is a 34-year-old male with a history of bipolar disorder who presents for psychiatric follow-up.  Staff reports he had an episode of anxiety last night where he became tearful and was given PRNs of Atarax which was effective.  He states that he was missing his daughter during trick-or-treating.  He is pleasant, calm and cooperative upon approach today.  Remains hypertalkative but easily redirectable and no longer rapid or pressured in his speech.  He describes his mood today as \"excited,\" and reports feeling stable.  He offers improving insight into his psychiatric condition and plans on continuing medications and attending follow-ups in the outpatient setting.  He would like to quit smoking THC as well.  Says that he needs to be a better father for his daughter, stop using " "drugs and get a job.  Patient overall very pleasant today.    Sleep: normal  Appetite: normal  Medication side effects: No   ROS: no complaints, all other systems are negative    Mental Status Evaluation:    Appearance: casually dressed, appears consistent with stated age, normal grooming, tattooed  Motor: no psychomotor disturbances, no gait abnormalities  Behavior: Pleasant, calm, cooperative, interacts with this writer appropriately  Speech: Hypertalkative but not rapid or pressured  Mood: \"Excited, feeling stable\"  Affect: Bright, mood-congruent  Thought Process: Circumstantial at times but mostly organized and linear  Thought Content: denies any delusional material, no preoccupation  Perception: denies any auditory or visual hallucinations, denies other perceptual disturbances  Risk Potential: denies suicidal ideation, plan, or intent. Denies homicidal ideation  Sensorium: Oriented to person, place, time, and situation  Cognition: cognitive ability appears intact but was not quantitatively tested  Consciousness: alert and awake  Attention/Concentration: [place] than expected for age  Insight: improved  Judgement: improved    Vital signs in last 24 hours:    Temp:  [97.5 °F (36.4 °C)-98.3 °F (36.8 °C)] 97.5 °F (36.4 °C)  HR:  [73-75] 75  BP: (127-135)/(72-77) 135/72  Resp:  [16] 16  SpO2:  [98 %] 98 %  O2 Device: None (Room air)    Laboratory results: I have personally reviewed all pertinent laboratory/tests results    Results from the past 24 hours: No results found for this or any previous visit (from the past 24 hour(s)).  Most Recent Labs:   Lab Results   Component Value Date    WBC 9.47 10/24/2024    RBC 4.63 10/24/2024    HGB 14.6 10/24/2024    HCT 44.3 10/24/2024     10/24/2024    RDW 12.8 10/24/2024    NEUTROABS 5.07 10/24/2024    SODIUM 140 10/24/2024    K 4.0 10/24/2024     10/24/2024    CO2 32 10/24/2024    BUN 25 10/24/2024    CREATININE 1.08 10/24/2024    GLUC 100 10/24/2024    CALCIUM " 9.5 10/24/2024    AST 16 10/24/2024    ALT 15 10/24/2024    ALKPHOS 73 10/24/2024    TP 7.1 10/24/2024    ALB 4.8 10/24/2024    TBILI 0.21 10/24/2024    CHOLESTEROL 193 10/17/2024    HDL 50 10/17/2024    TRIG 77 10/17/2024    LDLCALC 128 (H) 10/17/2024    NONHDLC 143 10/17/2024    VALPROICTOT 51 10/24/2024    [id number] 2.868 10/17/2024    SYPHILISAB Non-reactive 10/17/2024       Progress Toward Goals: progressing, working on coping skills, discharge planning    Risks / Benefits of Treatment:    Risks, benefits, and possible side effects of medications explained to patient and patient verbalizes understanding and agreement for treatment.    Counseling / Coordination of Care:    Patient's progress discussed with staff in treatment team meeting.  Medications, treatment progress and treatment plan reviewed with patient.    Aram Guerrero PA-C 10/26/24    This note was constructed with the assistance of network approved dictation software. Please excuse any minor errors of syntax or grammar as a result.

## 2024-10-26 NOTE — NURSING NOTE
"Patient pleasant and cooperative. States he is doing \"great\" today. Denies SI/HI/AVH. Patient using drawing as a coping skill today. Denies any unmet needs at this time.   "

## 2024-10-27 RX ADMIN — OLANZAPINE 5 MG: 5 TABLET, FILM COATED ORAL at 21:01

## 2024-10-27 RX ADMIN — HYDROXYZINE HYDROCHLORIDE 50 MG: 50 TABLET, FILM COATED ORAL at 10:05

## 2024-10-27 RX ADMIN — NICOTINE POLACRILEX 4 MG: 4 GUM, CHEWING BUCCAL at 14:27

## 2024-10-27 RX ADMIN — NICOTINE POLACRILEX 4 MG: 4 GUM, CHEWING BUCCAL at 12:27

## 2024-10-27 RX ADMIN — NICOTINE POLACRILEX 4 MG: 4 GUM, CHEWING BUCCAL at 17:50

## 2024-10-27 RX ADMIN — NICOTINE POLACRILEX 4 MG: 4 GUM, CHEWING BUCCAL at 09:06

## 2024-10-27 RX ADMIN — TRAZODONE HYDROCHLORIDE 50 MG: 50 TABLET ORAL at 21:01

## 2024-10-27 RX ADMIN — NICOTINE POLACRILEX 4 MG: 4 GUM, CHEWING BUCCAL at 20:09

## 2024-10-27 RX ADMIN — DIVALPROEX SODIUM 750 MG: 250 TABLET, EXTENDED RELEASE ORAL at 21:01

## 2024-10-27 NOTE — NURSING NOTE
Patient reported elevated anxiety related to anticipation of discharge and racing thoughts. Patient received Atarax 50mg PO at 1005, upon reassessment patient reported medication was effective. Patient denies SI/HI/AVH. Remains bright and social with staff and peers. Denies any additional needs at this time.

## 2024-10-27 NOTE — NURSING NOTE
Patient pleasant and cooperative. Patient denies SI/HI/AVH. Patient socializing with peers throughout the afternoon. Denies any unmet needs at this time.

## 2024-10-27 NOTE — PROGRESS NOTES
Progress Note - Behavioral Health     Name: Raudel Bland 34 y.o. male I MRN: [id number]   Unit/Bed#: -02 I Date of Admission: 10/16/2024   Date of Service: 10/27/2024 I Hospital Day: 11         Assessment & Plan  Bipolar disorder (HCC)  VPA level 21 on 10/20, dose adjusted to 750mg qhs, repeat level 51 on 10/24 and therapeutic  Zyprexa 5 mg at bedtime  Cannabis use disorder  Encouraged cessation  PTSD (post-traumatic stress disorder)  Psychotherapy     Principal Problem:    Bipolar disorder (HCC)  Active Problems:    Cannabis use disorder    PTSD (post-traumatic stress disorder)       Recommended Treatment:     Planned medication and treatment changes:    All current active medications have been reviewed  Encourage group therapy, milieu therapy and occupational therapy  Behavioral Health checks every 15 minutes  On a 201 commitment status    Continue current medications.  Discharge planning for tomorrow.    Current medications:  Current Facility-Administered Medications   Medication Dose Route Frequency Provider Last Rate    aluminum-magnesium hydroxide-simethicone  30 mL Oral Q4H PRN Cathy Garrison MD      haloperidol lactate  2.5 mg Intramuscular Q4H PRN Max 4/day Cathy Garrison MD      And    LORazepam  1 mg Intramuscular Q4H PRN Max 4/day Cathy Garrison MD      And    benztropine  0.5 mg Intramuscular Q4H PRN Max 4/day Cathy Garrison MD      haloperidol lactate  5 mg Intramuscular Q4H PRN Max 4/day Cathy Garrison MD      And    LORazepam  2 mg Intramuscular Q4H PRN Max 4/day Cathy Garrison MD      And    benztropine  1 mg Intramuscular Q4H PRN Max 4/day Cathy Garrison MD      benztropine  1 mg Intramuscular Q4H PRN Max 6/day Cathy Garrison MD      benztropine  1 mg Oral Q4H PRN Max 6/day Cathy Garrison MD      bisacodyl  10 mg Rectal Daily PRN Cathy Garrison MD      hydrOXYzine HCL  50 mg Oral Q6H PRN Max 4/day Cathy Garrison MD      Or    diphenhydrAMINE  50 mg  Intramuscular Q6H PRN Cathy Garrison MD      divalproex sodium  750 mg Oral HS Aram Guerrero PA-C      haloperidol  1 mg Oral Q6H PRN Cathy Garrison MD      haloperidol  2.5 mg Oral Q4H PRN Max 4/day Cathy Garrison MD      haloperidol  5 mg Oral Q4H PRN Max 4/day Cathy Garrison MD      hydrOXYzine HCL  100 mg Oral Q6H PRN Max 4/day Cathy Garrison MD      Or    LORazepam  2 mg Intramuscular Q6H PRN Cathy Garrison MD      hydrOXYzine HCL  25 mg Oral Q6H PRN Max 4/day Cathy Garrison MD      ibuprofen  400 mg Oral Q4H PRN Cathy Garrison MD      ibuprofen  600 mg Oral Q6H PRN Cathy Garrison MD      ibuprofen  800 mg Oral Q8H PRN Cathy Garrison MD      nicotine polacrilex  4 mg Oral Q2H PRN Cathy Garrison MD      OLANZapine  5 mg Oral HS Ivy Montoya MD      polyethylene glycol  17 g Oral Daily PRN Cathy Garrison MD      propranolol  10 mg Oral Q8H PRN Cathy Garrison MD      senna-docusate sodium  1 tablet Oral Daily PRN Cathy Garrison MD      traZODone  50 mg Oral HS PRN Cathy Garrison MD         Behavior over the last 24 hours: unchanged.     Raudel is a 34-year-old male with a history of bipolar disorder who presents for psychiatric follow-up.  Staff reports no behavioral issues overnight.  He is pleasant, calm and cooperative upon approach.  Continues to be bright, visible and social in the milieu with active participation in inpatient programming.  Offers no acute concerns.  Feels he is improved and back to his psychiatric baseline.  He is forward thinking, goal oriented and optimistic about the future.  Feels ready for discharge tomorrow.  No SI or HI.  No AH or VH.    Sleep: normal  Appetite: normal  Medication side effects: No   ROS: no complaints, all other systems are negative    Mental Status Evaluation:    Appearance: casually dressed, appears consistent with stated age, normal grooming  Motor: no psychomotor disturbances, no gait  "abnormalities  Behavior: calm, cooperative, interacts with this writer appropriately  Speech: normal rate, rhythm, and volume  Mood: \"good\"  Affect: appropriate, normal range and intensity, mood-congruent  Thought Process: organized, linear, and goal-oriented; intact associations  Thought Content: denies any delusional material, no preoccupation  Perception: denies any auditory or visual hallucinations, denies other perceptual disturbances  Risk Potential: denies suicidal ideation, plan, or intent. Denies homicidal ideation  Sensorium: Oriented to person, place, time, and situation  Cognition: cognitive ability appears intact but was not quantitatively tested  Consciousness: alert and awake  Attention/Concentration: able to focus without difficulty, attention and concentration are age appropriate  Insight: improved  Judgement: improved    Vital signs in last 24 hours:    Temp:  [98.2 °F (36.8 °C)-98.5 °F (36.9 °C)] 98.5 °F (36.9 °C)  HR:  [] 72  BP: (136-145)/(75-95) 136/95  Resp:  [18-20] 18  SpO2:  [98 %-99 %] 99 %  O2 Device: None (Room air)    Laboratory results: I have personally reviewed all pertinent laboratory/tests results    Results from the past 24 hours: No results found for this or any previous visit (from the past 24 hour(s)).  Most Recent Labs:   Lab Results   Component Value Date    WBC 9.47 10/24/2024    RBC 4.63 10/24/2024    HGB 14.6 10/24/2024    HCT 44.3 10/24/2024     10/24/2024    RDW 12.8 10/24/2024    NEUTROABS 5.07 10/24/2024    SODIUM 140 10/24/2024    K 4.0 10/24/2024     10/24/2024    CO2 32 10/24/2024    BUN 25 10/24/2024    CREATININE 1.08 10/24/2024    GLUC 100 10/24/2024    CALCIUM 9.5 10/24/2024    AST 16 10/24/2024    ALT 15 10/24/2024    ALKPHOS 73 10/24/2024    TP 7.1 10/24/2024    ALB 4.8 10/24/2024    TBILI 0.21 10/24/2024    CHOLESTEROL 193 10/17/2024    HDL 50 10/17/2024    TRIG 77 10/17/2024    LDLCALC 128 (H) 10/17/2024    NONHDLC 143 10/17/2024    " VALPROICTOT 51 10/24/2024    WHL1DCMCSXMV 2.868 10/17/2024    SYPHILISAB Non-reactive 10/17/2024       Progress Toward Goals: progressing, working on coping skills, discharge planning    Risks / Benefits of Treatment:    Risks, benefits, and possible side effects of medications explained to patient and patient verbalizes understanding and agreement for treatment.    Counseling / Coordination of Care:    Patient's progress discussed with staff in treatment team meeting.  Medications, treatment progress and treatment plan reviewed with patient.    Aram Guerrero PA-C 10/27/24    This note was constructed with the assistance of network approved dictation software. Please excuse any minor errors of syntax or grammar as a result.

## 2024-10-27 NOTE — TREATMENT TEAM
10/27/24 0900   Team Meeting   Meeting Type Daily Rounds   Team Members Present   Team Members Present Physician;Nurse   Physician Team Member Dianelys Shaymore   Nursing Team Member Maria Antonia   Patient/Family Present   Patient Present No   Patient's Family Present No     Daily Rounds: Pt is pleasant, cooperative.  Utilizes Atarax PRN for anxiety.    Plan for discharge tomorrow and will order Atarax.

## 2024-10-27 NOTE — NURSING NOTE
Belongings dropped off by patient's friend, Yonatan. RN performed search and inventoried items.     At Bedside   1 pair of Black Socks  1 pair of Black/Gray Sneakers   1 Chap stick    In Storage  11 pairs of Black Socks

## 2024-10-28 ENCOUNTER — PATIENT OUTREACH (OUTPATIENT)
Dept: CASE MANAGEMENT | Facility: HOSPITAL | Age: 34
End: 2024-10-28

## 2024-10-28 VITALS
BODY MASS INDEX: 21.13 KG/M2 | OXYGEN SATURATION: 98 % | TEMPERATURE: 97.1 F | SYSTOLIC BLOOD PRESSURE: 105 MMHG | HEART RATE: 73 BPM | DIASTOLIC BLOOD PRESSURE: 71 MMHG | HEIGHT: 68 IN | WEIGHT: 139.4 LBS | RESPIRATION RATE: 17 BRPM

## 2024-10-28 PROCEDURE — 99239 HOSP IP/OBS DSCHRG MGMT >30: CPT | Performed by: PHYSICIAN ASSISTANT

## 2024-10-28 RX ADMIN — NICOTINE POLACRILEX 4 MG: 4 GUM, CHEWING BUCCAL at 07:57

## 2024-10-28 NOTE — PROGRESS NOTES
Treatment Team Rounds Completed  Medical and Psychiatric Review Completed  D/C: Today - via [facility]AR at noon / Pt will be given a 30 day supply of his medications & referred to [facility] for [facility] funded outpatient.      10/28/24 0750   Team Meeting   Meeting Type Daily Rounds   Team Members Present   Team Members Present Physician;Nurse;;Other (Discipline and Name)   Physician Team Member Dr. Sharma / OLIVIA Guerrero / OLIVIA Puentes / PA Student   Nursing Team Member Manuel   Care Management Team Member Gianna / Mita / Julius / Harrison   Other (Discipline and Name) Rashel(group facilitator)   Patient/Family Present   Patient Present No   Patient's Family Present No

## 2024-10-28 NOTE — PLAN OF CARE
Problem: Depression  Goal: Treatment Goal: Demonstrate behavioral control of depressive symptoms, verbalize feelings of improved mood/affect, and adopt new coping skills prior to discharge  Outcome: Progressing     Problem: Anxiety  Goal: Anxiety is at manageable level  Description: Interventions:  - Assess and monitor patient's anxiety level.   - Monitor for signs and symptoms (heart palpitations, chest pain, shortness of breath, headaches, nausea, feeling jumpy, restlessness, irritable, apprehensive).   - Collaborate with interdisciplinary team and initiate plan and interventions as ordered.  - Orange patient to unit/surroundings  - Explain treatment plan  - Encourage participation in care  - Encourage verbalization of concerns/fears  - Identify coping mechanisms  - Assist in developing anxiety-reducing skills  - Administer/offer alternative therapies  - Limit or eliminate stimulants  Outcome: Progressing     Problem: SELF HARM/SUICIDALITY  Goal: Will have no self-injury during hospital stay  Description: INTERVENTIONS:  - Q 15 MINUTES: Routine safety checks  - Q WAKING SHIFT & PRN: Assess risk to determine if routine checks are adequate to maintain patient safety  - Encourage patient to participate actively in care by formulating a plan to combat response to suicidal ideation, identify supports and resources  Outcome: Progressing

## 2024-10-28 NOTE — PLAN OF CARE
Patient attends select groups and other unit activities.     Problem: Ineffective Coping  Goal: Participates in unit activities  Description: Interventions:  - Provide therapeutic environment   - Provide required programming   - Redirect inappropriate behaviors   Outcome: Progressing

## 2024-10-28 NOTE — NURSING NOTE
Raudel reports readiness for discharge. Denies SI/HI/AVH. He reports vivid dreams last night that were not bothersome. Encouraged to come to staff with questions or concerns.

## 2024-10-28 NOTE — PROGRESS NOTES
AUDIT: 0, PAWSS: 0, BAT: 0, UDS: + THC    Pt reported he has been smoking marijuana every once in awhile.  Pt had a medical card in the past & but it .  Pt also has a history of sniffing/smoking meth.  Pt first began use when he was 22 y/o but hasn't used in the past year.  His longest period of clean time was 4 yrs.  Pt smokes about .5 to 1 pack/day of cigarettes.      Pt referred to [facility] for [facility].

## 2024-10-28 NOTE — CASE MANAGEMENT
CM met with Pt & assisted him with calling [facility] @ [phone] & spoke with Iliana to completed the screening/assessment.  Pt agreed to be referred to [facility] & Iliana said she would send the referral.    Pt reviewed & signed his IMM.  CM & Pt discussed next steps & Pt requested to be transported to [address] [place] [address]. Pt said that he needs to get a new phone & he plans to go to the [employer] & social security.  CM reviewed that there was a copy of his SS card scanned into media & offered to print it for him.  Pt appreciative & hopeful he can use it to get a job.  Pt reported feeling much better since restarting medication and he has plans to look for a job & get himself back on his feet, while he figures out his relationship with Queenie.  Pt had no questions about his d/c plans.    CM able to obtain a 30 day supply of Pt's medications (Depakote & Zyprexa).      MERCED[facility] electronically sent transportation request for today: ETA 12:00 PM via [facility].

## 2024-10-28 NOTE — NURSING NOTE
Reassessment post sleep aid medication administration. Trazodone 50 mg given at 2101. Patient observed sleeping at this time. Unlabored breathing observed. No signs or symptoms of distressed noted. Medication appears to be effective.

## 2024-10-28 NOTE — PROGRESS NOTES
Pt's ex-girlfriend Queenie called navigator and stated that this pt is using her phone number and her young daughter's phone number as his contact numbers and she does not want him using any of their phone numbers. Queenie states they are not in a relationship and she wants nothing to do with pt's business or issues. Queenie's phone number is [phone] and daughter is [phone] - do NOT use these numbers as per Queenie.

## 2024-11-20 ENCOUNTER — HOSPITAL ENCOUNTER (INPATIENT)
Facility: HOSPITAL | Age: 34
LOS: 13 days | Discharge: HOME | DRG: 885 | End: 2024-12-04
Attending: EMERGENCY MEDICINE | Admitting: PSYCHIATRY & NEUROLOGY
Payer: MEDICARE

## 2024-11-20 DIAGNOSIS — Z00.8 MEDICAL CLEARANCE FOR PSYCHIATRIC ADMISSION: ICD-10-CM

## 2024-11-20 DIAGNOSIS — E53.8 FOLIC ACID DEFICIENCY: ICD-10-CM

## 2024-11-20 DIAGNOSIS — F39 UNSPECIFIED MOOD (AFFECTIVE) DISORDER (HCC): ICD-10-CM

## 2024-11-20 DIAGNOSIS — F32.A DEPRESSION, UNSPECIFIED DEPRESSION TYPE: Primary | ICD-10-CM

## 2024-11-20 DIAGNOSIS — E55.9 VITAMIN D DEFICIENCY: ICD-10-CM

## 2024-11-20 LAB
AMPHETAMINES SERPL QL SCN: POSITIVE
BARBITURATES UR QL: NEGATIVE
BENZODIAZ UR QL: NEGATIVE
COCAINE UR QL: NEGATIVE
ETHANOL EXG-MCNC: 0 MG/DL
FENTANYL UR QL SCN: NEGATIVE
HYDROCODONE UR QL SCN: NEGATIVE
METHADONE UR QL: NEGATIVE
OPIATES UR QL SCN: NEGATIVE
OXYCODONE+OXYMORPHONE UR QL SCN: NEGATIVE
PCP UR QL: NEGATIVE
THC UR QL: POSITIVE

## 2024-11-20 PROCEDURE — 99283 EMERGENCY DEPT VISIT LOW MDM: CPT

## 2024-11-20 PROCEDURE — 99285 EMERGENCY DEPT VISIT HI MDM: CPT | Performed by: EMERGENCY MEDICINE

## 2024-11-20 PROCEDURE — 82075 ASSAY OF BREATH ETHANOL: CPT | Performed by: EMERGENCY MEDICINE

## 2024-11-20 PROCEDURE — 80307 DRUG TEST PRSMV CHEM ANLYZR: CPT | Performed by: EMERGENCY MEDICINE

## 2024-11-20 RX ORDER — OLANZAPINE 5 MG/1
5 TABLET ORAL
Status: DISCONTINUED | OUTPATIENT
Start: 2024-11-20 | End: 2024-11-21

## 2024-11-20 RX ADMIN — OLANZAPINE 5 MG: 5 TABLET, FILM COATED ORAL at 22:08

## 2024-11-20 NOTE — ED PROVIDER NOTES
"Time reflects when diagnosis was documented in both MDM as applicable and the Disposition within this note       Time User Action Codes Description Comment    11/20/2024  9:25 PM John Melo Add [F32.A] Depression, unspecified depression type           ED Disposition       ED Disposition   Transfer to Behavioral Health    [facility]   --    Date/Time   Wed Nov 20, 2024  9:25 PM    Comment   Raudel Bland should be transferred out to  and has been medically cleared.                Assessment & Plan       Medical Decision Making  Patient evaluated by crisis and signed a 201 homelessness bipolar depression mood swings noncompliant with medications patient is medically cleared   Drug Screen was positive for marijuana and methamphetamine    Amount and/or Complexity of Data Reviewed  Labs: ordered.    Risk  Prescription drug management.  Decision regarding hospitalization.             Medications   OLANZapine (ZyPREXA) tablet 5 mg (has no administration in time range)       ED Risk Strat Scores                                        Patient medically cleared for behavioral health evaluation.       History of Present Illness       Chief Complaint   Patient presents with    Psychiatric Evaluation     Got \"kicked out of my home cus of my mood swings\"  from his wife pt \"going through it\" pt tearful explaining that he's supposed to be on meds and is not taking them pt is hopeful for in-patient treatment       Past Medical History:   Diagnosis Date    Bipolar 1 disorder (HCC)     Manic depression (HCC)       History reviewed. No pertinent surgical history.   History reviewed. No pertinent family history.   Social History     Tobacco Use    Smoking status: Every Day     Current packs/day: 0.50     Types: Cigarettes    Smokeless tobacco: Never   Vaping Use    Vaping status: Former    Substances: THC   Substance Use Topics    Alcohol use: Yes     Alcohol/week: 1.0 standard drink of alcohol     Types: 1 Cans of beer " per week     Comment: social drinking 1-2 beers    Drug use: Not Currently     Types: Methamphetamines, Marijuana     Comment: last use july 2024      E-Cigarette/Vaping    E-Cigarette Use Former User       E-Cigarette/Vaping Substances    Nicotine No     THC Yes     CBD No     Flavoring No     Other No     Unknown No       I have reviewed and agree with the history as documented.     Patient with history of bipolar disorder ADHD OCD PTSD who got out of [facility] couple weeks ago says he lost his medications he is having a lot of mood swings his wife felt to be the best thing for him to come in to get some help he says he wants his family back together again denies suicidal homicidal fever chest pain shortness of breath vomiting diarrhea no hallucination      Psychiatric Evaluation  Presenting symptoms: no hallucinations and no suicidal thoughts    Associated symptoms: no abdominal pain and no chest pain        Review of Systems   Constitutional:  Negative for chills and fever.   Eyes:  Negative for visual disturbance.   Respiratory:  Negative for shortness of breath.    Cardiovascular:  Negative for chest pain.   Gastrointestinal:  Negative for abdominal pain and vomiting.   Musculoskeletal:  Negative for arthralgias and back pain.   Skin:  Negative for color change and rash.   Neurological:  Negative for seizures and syncope.   Psychiatric/Behavioral:  Negative for confusion, hallucinations and suicidal ideas.    All other systems reviewed and are negative.          Objective       ED Triage Vitals [11/20/24 1752]   Temperature Pulse Blood Pressure Respirations SpO2 Patient Position - Orthostatic VS   99.2 °F (37.3 °C) (!) 116 141/85 18 97 % Sitting      Temp Source Heart Rate Source BP Location FiO2 (%) Pain Score    Oral Monitor Left arm -- --      Vitals      Date and Time Temp Pulse SpO2 Resp BP Pain Score FACES Pain Rating User   11/20/24 1752 99.2 °F (37.3 °C) 116 97 % 18 141/85 -- -- JW             Physical Exam  Vitals and nursing note reviewed.   Constitutional:       General: He is not in acute distress.     Appearance: He is well-developed. He is not ill-appearing, toxic-appearing or diaphoretic.   HENT:      Head: Normocephalic and atraumatic.   Eyes:      Extraocular Movements: Extraocular movements intact.      Conjunctiva/sclera: Conjunctivae normal.      Pupils: Pupils are equal, round, and reactive to light.   Cardiovascular:      Rate and Rhythm: Normal rate and regular rhythm.      Heart sounds: No murmur heard.  Pulmonary:      Effort: Pulmonary effort is normal. No respiratory distress.      Breath sounds: Normal breath sounds.   Abdominal:      Palpations: Abdomen is soft.      Tenderness: There is no abdominal tenderness.   Musculoskeletal:         General: No swelling.      Cervical back: Normal range of motion and neck supple.   Skin:     General: Skin is warm and dry.      Capillary Refill: Capillary refill takes less than 2 seconds.      Coloration: Skin is not jaundiced.   Neurological:      General: No focal deficit present.      Mental Status: He is alert.      Cranial Nerves: No cranial nerve deficit.      Motor: No weakness.      Comments: Oriented   Psychiatric:         Mood and Affect: Mood normal.         Thought Content: Thought content normal.      Comments: Patient is tearful, nml  thoughts         Results Reviewed       Procedure Component Value Units Date/Time    Rapid drug screen, urine [[id number]]  (Abnormal) Collected: 11/20/24 1814    Lab Status: Final result Specimen: Urine, Clean Catch Updated: 11/20/24 1840     Amph/Meth UR Positive     Barbiturate Ur Negative     Benzodiazepine Urine Negative     Cocaine Urine Negative     Methadone Urine Negative     Opiate Urine Negative     PCP Ur Negative     THC Urine Positive     Oxycodone Urine Negative     Fentanyl Urine Negative     HYDROCODONE URINE Negative    Narrative:      Presumptive report. If requested, specimen  will be sent to reference lab for confirmation.  FOR MEDICAL PURPOSES ONLY.   IF CONFIRMATION NEEDED PLEASE CONTACT THE LAB WITHIN 5 DAYS.    Drug Screen Cutoff Levels:  AMPHETAMINE/METHAMPHETAMINES  1000 ng/mL  BARBITURATES     200 ng/mL  BENZODIAZEPINES     200 ng/mL  COCAINE      300 ng/mL  METHADONE      300 ng/mL  OPIATES      300 ng/mL  PHENCYCLIDINE     25 ng/mL  THC       50 ng/mL  OXYCODONE      100 ng/mL  FENTANYL      5 ng/mL  HYDROCODONE     300 ng/mL    POCT alcohol breath test [[id number]]  (Normal) Resulted: 11/20/24 1812    Lab Status: Final result Updated: 11/20/24 1812     EXTBreath Alcohol 0.00            No orders to display       Procedures    ED Medication and Procedure Management   Prior to Admission Medications   Prescriptions Last Dose Informant Patient Reported? Taking?   OLANZapine (ZyPREXA) 5 mg tablet   No No   Sig: Take 1 tablet (5 mg total) by mouth daily at bedtime   divalproex sodium (DEPAKOTE ER) 250 mg 24 hr tablet   No No   Sig: Take 3 tablets (750 mg total) by mouth daily at bedtime      Facility-Administered Medications: None     Patient's Medications   Discharge Prescriptions    No medications on file     No discharge procedures on file.  ED SEPSIS DOCUMENTATION   Time reflects when diagnosis was documented in both MDM as applicable and the Disposition within this note       Time User Action Codes Description Comment    11/20/2024  9:25 PM John Melo Add [F32.A] Depression, unspecified depression type                  John Melo MD  11/20/24 2125       John Melo MD  11/20/24 2127       John Melo MD  11/20/24 2127       John Melo MD  11/20/24 2154

## 2024-11-21 PROBLEM — F39 UNSPECIFIED MOOD (AFFECTIVE) DISORDER (HCC): Status: ACTIVE | Noted: 2023-06-18

## 2024-11-21 PROBLEM — F41.1 GENERALIZED ANXIETY DISORDER: Status: ACTIVE | Noted: 2023-06-18

## 2024-11-21 PROBLEM — Z91.199 NONCOMPLIANCE: Status: ACTIVE | Noted: 2024-11-21

## 2024-11-21 PROBLEM — F33.2 SEVERE EPISODE OF RECURRENT MAJOR DEPRESSIVE DISORDER, WITHOUT PSYCHOTIC FEATURES (HCC): Status: ACTIVE | Noted: 2023-06-18

## 2024-11-21 PROBLEM — Z59.819 HOUSING SITUATION UNSTABLE: Status: ACTIVE | Noted: 2024-11-21

## 2024-11-21 PROBLEM — F31.9 BIPOLAR DISORDER (HCC): Chronic | Status: RESOLVED | Noted: 2023-06-18 | Resolved: 2024-11-21

## 2024-11-21 PROCEDURE — 99253 IP/OBS CNSLTJ NEW/EST LOW 45: CPT | Performed by: NURSE PRACTITIONER

## 2024-11-21 PROCEDURE — 99223 1ST HOSP IP/OBS HIGH 75: CPT | Performed by: PSYCHIATRY & NEUROLOGY

## 2024-11-21 RX ORDER — BENZTROPINE MESYLATE 1 MG/1
1 TABLET ORAL
Status: DISCONTINUED | OUTPATIENT
Start: 2024-11-21 | End: 2024-12-04 | Stop reason: HOSPADM

## 2024-11-21 RX ORDER — TRAZODONE HYDROCHLORIDE 50 MG/1
50 TABLET, FILM COATED ORAL
Status: DISCONTINUED | OUTPATIENT
Start: 2024-11-21 | End: 2024-12-03

## 2024-11-21 RX ORDER — OLANZAPINE 10 MG/1
10 TABLET ORAL
Status: DISCONTINUED | OUTPATIENT
Start: 2024-11-21 | End: 2024-12-04 | Stop reason: HOSPADM

## 2024-11-21 RX ORDER — LORAZEPAM 2 MG/ML
2 INJECTION INTRAMUSCULAR EVERY 6 HOURS PRN
Status: DISCONTINUED | OUTPATIENT
Start: 2024-11-21 | End: 2024-12-04 | Stop reason: HOSPADM

## 2024-11-21 RX ORDER — OLANZAPINE 7.5 MG/1
7.5 TABLET, FILM COATED ORAL
COMMUNITY
End: 2024-12-04

## 2024-11-21 RX ORDER — HYDROXYZINE HYDROCHLORIDE 25 MG/1
25 TABLET, FILM COATED ORAL
Status: DISCONTINUED | OUTPATIENT
Start: 2024-11-21 | End: 2024-12-04 | Stop reason: HOSPADM

## 2024-11-21 RX ORDER — HYDROXYZINE HYDROCHLORIDE 50 MG/1
50 TABLET, FILM COATED ORAL
Status: DISCONTINUED | OUTPATIENT
Start: 2024-11-21 | End: 2024-12-04 | Stop reason: HOSPADM

## 2024-11-21 RX ORDER — POLYETHYLENE GLYCOL 3350 17 G/17G
17 POWDER, FOR SOLUTION ORAL DAILY PRN
Status: DISCONTINUED | OUTPATIENT
Start: 2024-11-21 | End: 2024-12-04 | Stop reason: HOSPADM

## 2024-11-21 RX ORDER — DIVALPROEX SODIUM 250 MG/1
250 TABLET, FILM COATED, EXTENDED RELEASE ORAL DAILY
COMMUNITY
End: 2024-12-04

## 2024-11-21 RX ORDER — PROPRANOLOL HYDROCHLORIDE 10 MG/1
10 TABLET ORAL EVERY 8 HOURS PRN
Status: DISCONTINUED | OUTPATIENT
Start: 2024-11-21 | End: 2024-12-04 | Stop reason: HOSPADM

## 2024-11-21 RX ORDER — NICOTINE 21 MG/24HR
1 PATCH, TRANSDERMAL 24 HOURS TRANSDERMAL DAILY
Status: DISCONTINUED | OUTPATIENT
Start: 2024-11-21 | End: 2024-12-04 | Stop reason: HOSPADM

## 2024-11-21 RX ORDER — OLANZAPINE 2.5 MG/1
2.5 TABLET, FILM COATED ORAL
Status: DISCONTINUED | OUTPATIENT
Start: 2024-11-21 | End: 2024-12-04 | Stop reason: HOSPADM

## 2024-11-21 RX ORDER — MAGNESIUM HYDROXIDE/ALUMINUM HYDROXICE/SIMETHICONE 120; 1200; 1200 MG/30ML; MG/30ML; MG/30ML
30 SUSPENSION ORAL EVERY 4 HOURS PRN
Status: DISCONTINUED | OUTPATIENT
Start: 2024-11-21 | End: 2024-12-04 | Stop reason: HOSPADM

## 2024-11-21 RX ORDER — BENZTROPINE MESYLATE 1 MG/ML
1 INJECTION, SOLUTION INTRAMUSCULAR; INTRAVENOUS
Status: DISCONTINUED | OUTPATIENT
Start: 2024-11-21 | End: 2024-12-04 | Stop reason: HOSPADM

## 2024-11-21 RX ORDER — IBUPROFEN 400 MG/1
400 TABLET, FILM COATED ORAL EVERY 6 HOURS PRN
Status: DISCONTINUED | OUTPATIENT
Start: 2024-11-21 | End: 2024-12-04 | Stop reason: HOSPADM

## 2024-11-21 RX ORDER — POLYETHYLENE GLYCOL 3350 17 G/17G
17 POWDER, FOR SOLUTION ORAL DAILY PRN
Status: DISCONTINUED | OUTPATIENT
Start: 2024-11-21 | End: 2024-11-21

## 2024-11-21 RX ORDER — AMOXICILLIN 250 MG
1 CAPSULE ORAL DAILY PRN
Status: DISCONTINUED | OUTPATIENT
Start: 2024-11-21 | End: 2024-12-04 | Stop reason: HOSPADM

## 2024-11-21 RX ORDER — CITALOPRAM HYDROBROMIDE 10 MG/1
10 TABLET ORAL DAILY
COMMUNITY
End: 2024-12-04

## 2024-11-21 RX ORDER — DIPHENHYDRAMINE HYDROCHLORIDE 50 MG/ML
50 INJECTION INTRAMUSCULAR; INTRAVENOUS EVERY 6 HOURS PRN
Status: DISCONTINUED | OUTPATIENT
Start: 2024-11-21 | End: 2024-12-04 | Stop reason: HOSPADM

## 2024-11-21 RX ORDER — OLANZAPINE 5 MG/1
5 TABLET ORAL
COMMUNITY
End: 2024-12-04

## 2024-11-21 RX ORDER — OLANZAPINE 5 MG/1
5 TABLET ORAL
Status: DISCONTINUED | OUTPATIENT
Start: 2024-11-21 | End: 2024-12-04 | Stop reason: HOSPADM

## 2024-11-21 RX ORDER — ACETAMINOPHEN 325 MG/1
650 TABLET ORAL EVERY 6 HOURS PRN
Status: DISCONTINUED | OUTPATIENT
Start: 2024-11-21 | End: 2024-12-04 | Stop reason: HOSPADM

## 2024-11-21 RX ORDER — IBUPROFEN 600 MG/1
600 TABLET, FILM COATED ORAL EVERY 8 HOURS PRN
Status: DISCONTINUED | OUTPATIENT
Start: 2024-11-21 | End: 2024-12-04 | Stop reason: HOSPADM

## 2024-11-21 RX ORDER — HYDROXYZINE HYDROCHLORIDE 50 MG/1
100 TABLET, FILM COATED ORAL
Status: DISCONTINUED | OUTPATIENT
Start: 2024-11-21 | End: 2024-12-04 | Stop reason: HOSPADM

## 2024-11-21 RX ORDER — OLANZAPINE 10 MG/2ML
5 INJECTION, POWDER, FOR SOLUTION INTRAMUSCULAR
Status: DISCONTINUED | OUTPATIENT
Start: 2024-11-21 | End: 2024-12-04 | Stop reason: HOSPADM

## 2024-11-21 RX ORDER — OLANZAPINE 10 MG/2ML
10 INJECTION, POWDER, FOR SOLUTION INTRAMUSCULAR
Status: DISCONTINUED | OUTPATIENT
Start: 2024-11-21 | End: 2024-12-04 | Stop reason: HOSPADM

## 2024-11-21 RX ORDER — GABAPENTIN 300 MG/1
300 CAPSULE ORAL 3 TIMES DAILY
Status: DISCONTINUED | OUTPATIENT
Start: 2024-11-21 | End: 2024-11-25

## 2024-11-21 RX ADMIN — SERTRALINE HYDROCHLORIDE 50 MG: 50 TABLET ORAL at 14:53

## 2024-11-21 RX ADMIN — DIVALPROEX SODIUM 750 MG: 500 TABLET, EXTENDED RELEASE ORAL at 21:49

## 2024-11-21 RX ADMIN — TRAZODONE HYDROCHLORIDE 50 MG: 50 TABLET ORAL at 21:51

## 2024-11-21 RX ADMIN — GABAPENTIN 300 MG: 300 CAPSULE ORAL at 21:49

## 2024-11-21 RX ADMIN — NICOTINE POLACRILEX 2 MG: 2 GUM, CHEWING BUCCAL at 20:57

## 2024-11-21 RX ADMIN — NICOTINE POLACRILEX 2 MG: 2 GUM, CHEWING BUCCAL at 11:54

## 2024-11-21 RX ADMIN — GABAPENTIN 300 MG: 300 CAPSULE ORAL at 17:23

## 2024-11-21 RX ADMIN — MELATONIN TAB 3 MG 3 MG: 3 TAB at 21:49

## 2024-11-21 RX ADMIN — Medication 1 PATCH: at 14:53

## 2024-11-21 NOTE — ASSESSMENT & PLAN NOTE
Patient currently has no housing and is homeless  Housing options are limited secondary to [place]ord[place]'s law  The psych care manager will be the primary point person with this issue

## 2024-11-21 NOTE — ED NOTES
201 emailed to Intake. Pt would prefer in network options, but may need to discuss other bed search options if nothing is available by tomorrow.

## 2024-11-21 NOTE — ASSESSMENT & PLAN NOTE
Vital signs stable afebrile patient seen and examined by myself all labs are still pending  Patient medically stable for admit  Please reach out to [facility] with any medical questions or concerns

## 2024-11-21 NOTE — ED NOTES
"Pt presented to the ED as a self-referral due to worsening mood swings and noncompliance with medications. Pt was recently admitted for 12 days at [facility], and was discharged with outpatient follow up and 30-days of medications. Pt reports that since his discharge he has not had any outpatient appointments, and began running low on his medications before ultimately losing them in the process of a move. He was kicked out of his wife's home and moved in with a family member in [place], which he reports is not a supportive environment for him to be in. Pt reports he has not taken his medication in at least a week and was sporatic with compliance prior to that. he feels his mood swings have become too unpredictable and difficult to manage. Pt denies any SI/HI/AH/VH currently, but with his current homelessness/unstable housing, does not appear that he will properly follow up with a PHP referral at this time. He reports since being kicked out of his wife's home his thoughts have become \"dark\" when he is out on the street. he has prior suicide attempts, most recently being \"years\" ago when he put a belt around his neck. He reports no difficulty with sleep and appetite, but does report constant pacing. He has difficulty communicating with others and feel that he is without emotion. He was beginnig to notice an improvement while on medication when he was hospitalized. he reports using meth, but is vague with his usage history, but feels that he should be referred to a D&A program upon discharge in order to address substance abuse. He denies current legal involvement, but is on [employer] [place], which limits his housing shelter options. Based on EMR review, it also appears that his wife wanted to initially have minimal/no contact upon his previous discharge, but Pt does not present the situation as such. Pt denies access to firearms and is currently unemployed. Pt is willing to sign a 201 for medication stabilization. ED " provider in agreement with treatment plan. Pt is not currently presenting with 302 grounds.

## 2024-11-21 NOTE — NURSING NOTE
"Pt is a 201 from hospitals ED, pt self referred due to \"worsening mood-swings and non-compliance with medications. \" Pt has not been compliant with medication since his recent admission at [facility]. Pt currently is calm and cooperative and cooperative with admission process. Denies depression and anxiety at this time but is stating \"they want to do the right thing and finish treatment successfully.\" Pt has a hx of drug abuse, tested positive for marijuana and meth. Pt denied victimization but is currently on [employer] Law. Denies SI, HI, AH, and VH. CSSR- lifetime moderate/ CSSR- contact low risk. Cooperative with admission process, denies any needs at this time.  "

## 2024-11-21 NOTE — ASSESSMENT & PLAN NOTE
Patient has been noncompliant with his medication for his psychological needs  Please educate the patient on the importance of medication adherence as well as medical and psychological follow-up

## 2024-11-21 NOTE — PLAN OF CARE
Problem: DISCHARGE PLANNING  Goal: Discharge to home or other facility with appropriate resources  Description: INTERVENTIONS:  - Identify barriers to discharge w/patient and caregiver  - Arrange for needed discharge resources and transportation as appropriate  - Identify discharge learning needs (meds, wound care, etc.)  - Arrange for interpretive services to assist at discharge as needed  - Refer to Case Management Department for coordinating discharge planning if the patient needs post-hospital services based on physician/advanced practitioner order or complex needs related to functional status, cognitive ability, or social support system  Outcome: Not Progressing     Problem: SELF HARM/SUICIDALITY  Goal: Will have no self-injury during hospital stay  Description: INTERVENTIONS:  - Q 15 MINUTES: Routine safety checks  - Q WAKING SHIFT & PRN: Assess risk to determine if routine checks are adequate to maintain patient safety  - Encourage patient to participate actively in care by formulating a plan to combat response to suicidal ideation, identify supports and resources  Outcome: Not Progressing     Problem: DEPRESSION  Goal: Will be euthymic at discharge  Description: INTERVENTIONS:  - Administer medication as ordered  - Provide emotional support via 1:1 interaction with staff  - Encourage involvement in milieu/groups/activities  - Monitor for social isolation  Outcome: Not Progressing     Problem: SUBSTANCE USE/ABUSE  Goal: Will have no detox symptoms and will verbalize plan for changing substance-related behavior  Description: INTERVENTIONS:  - Monitor physical status and assess for symptoms of withdrawal  - Administer medication as ordered  - Provide emotional support with 1 on 1 interaction with staff  - Encourage recovery focused program/ addiction education  - Assess for verbalization of changing behaviors related to substance abuse  - Initiate consults and referrals as appropriate (Case Management,  Spiritual Care, etc.)  Outcome: Not Progressing  Goal: By discharge, will develop insight into their chemical dependency and sustain motivation to continue in recovery  Description: INTERVENTIONS:  - Attends all daily group sessions and scheduled AA groups  - Actively practices coping skills through participation in the therapeutic community and adherence to program rules  - Reviews and completes assignments from individual treatment plan  - Assist patient development of understanding of their personal cycle of addiction and relapse triggers  Outcome: Not Progressing  Goal: By discharge, patient will have ongoing treatment plan addressing chemical dependency  Description: INTERVENTIONS:  - Assist patient with resources and/or appointments for ongoing recovery based living  Outcome: Not Progressing

## 2024-11-21 NOTE — ASSESSMENT & PLAN NOTE
Patient is a long-term active tobacco abuser greater than 1 year  Patient will have a nicotine patch and nicotine gum available to him for nicotine replacement therapy  Smoking cessation education

## 2024-11-21 NOTE — ASSESSMENT & PLAN NOTE
Differentials at this time: major depressive disorder versus substance-induced mood disorder versus bipolar disorder versus intermittent explosive disorder - patient screens positive for depression, however, it is unclear if the mood fluctuations he describes are consistent with gianna/hypomania as there was often co-occurring substance use and symptomatology described vaguely. There does appear to be difficulty controlling anger with outbursts that are often out of proportion to the situation at hand, consistent with IED    Begin the following medication regimen:   Start Depakote  mg at bed time for mood stabilization and irritability  Depakote level, CBC with diff, and CMP ordered for 11/24/2024   Start Zoloft 50 mg daily for depression and anxiety   Start Gabapentin 300 mg three times daily for anxiety and agitation   Start Melatonin 3 mg at bedtime for insomnia

## 2024-11-21 NOTE — ED NOTES
No male beds available in network at this time. AM crisis to follow up with Intake regarding bed availability before initiating bed search.

## 2024-11-21 NOTE — CONSULTS
Consultation - Hospitalist   Name: Raudel Bland 34 y.o. male I MRN: [id number]  Unit/Bed#: -01 I Date of Admission: 11/20/2024   Date of Service: 11/21/2024 I Hospital Day: 0   Inpatient consult for Medical Clearance for  patient  Consult performed by: JAM Copeland  Consult ordered by: Amy Orta DO        Physician Requesting Evaluation: Raman Loyola MD   Reason for Evaluation / Principal Problem: Medical clearance for psychiatric admission    Assessment & Plan  Medical clearance for psychiatric admission  Vital signs stable afebrile patient seen and examined by myself all labs are still pending  Patient medically stable for admit  Please reach out to  IM with any medical questions or concerns  Methamphetamine use (HCC)  U tox positive for methamphetamines  Drug cessation education  Cannabis use disorder  U tox positive for THC  THC cessation education  Tobacco abuse  Patient is a long-term active tobacco abuser greater than 1 year  Patient will have a nicotine patch and nicotine gum available to him for nicotine replacement therapy  Smoking cessation education  Housing situation unstable  Patient currently has no housing and is homeless  Housing options are limited secondary to [employer] law  The psych care manager will be the primary point person with this issue  Noncompliance  Patient has been noncompliant with his medication for his psychological needs  Please educate the patient on the importance of medication adherence as well as medical and psychological follow-up        Collaboration of Care: Were Recommendations Directly Discussed with Primary Treatment Team? - No     History of Present Illness   Raudel Bland is a 34 y.o. male who is originally admitted to the psychiatry service due to worsening depressions, mood swings and noncompliance with psych meds. We are consulted for medical clearance for admission to Behavioral Health Unit and treatment of underlying  psychiatric illness.      Patient self presents yesterday to [facility] endorsing worsening mood swings increasing depression and noncompliance with his psych medication.  Patient was recently inpatient at [facility] from 10/16/2024 through 10/28/2024.  For the same issues.  Patient has a longstanding history of bipolar disorder, ADHD, ODD and PTSD.  Patient has had a prior [facility] admission back in June 2023.  Patient has had a prior suicide attempt with a belt around his neck.  Patient endorses decreased sleep and decreased appetite.  Patient has recently been left homeless and is living on the streets and with some friends.  His U tox was positive for methamphetamines as well as THC.  He does not have any significant past medical history.  He is now admitted to the [facility] for stabilization of his mental health issues.    Review of Systems   Constitutional:  Positive for appetite change. Negative for chills and fever.   HENT:  Negative for ear pain and sore throat.    Eyes:  Negative for pain and visual disturbance.   Respiratory:  Negative for cough and shortness of breath.    Cardiovascular:  Negative for chest pain and palpitations.   Gastrointestinal:  Negative for abdominal pain and vomiting.   Genitourinary:  Negative for dysuria and hematuria.   Musculoskeletal:  Negative for arthralgias and back pain.   Skin:  Negative for color change and rash.   Neurological:  Negative for seizures and syncope.   Psychiatric/Behavioral:  Positive for decreased concentration, dysphoric mood and sleep disturbance.    All other systems reviewed and are negative.      Historical Information   Past Medical History:   Diagnosis Date    Bipolar 1 disorder (HCC)     Manic depression (HCC)      History reviewed. No pertinent surgical history.  Social History     Tobacco Use    Smoking status: Every Day     Current packs/day: 0.50     Types: Cigarettes    Smokeless tobacco: Never   Vaping Use    Vaping status:  "Former    Substances: THC   Substance and Sexual Activity    Alcohol use: Yes     Alcohol/week: 1.0 standard drink of alcohol     Types: 1 Cans of beer per week     Comment: social drinking 1-2 beers    Drug use: Not Currently     Types: Methamphetamines, Marijuana     Comment: last use july 2024    Sexual activity: Yes     Partners: Female     E-Cigarette/Vaping    E-Cigarette Use Former User      E-Cigarette/Vaping Substances    Nicotine No     THC Yes     CBD No     Flavoring No     Other No     Unknown No        Marital Status: Single    Meds/Allergies   I have reviewed home medications using recent Epic encounter.  Prior to Admission medications    Medication Sig Start Date End Date Taking? Authorizing Provider   citalopram (CeleXA) 10 mg tablet Take 10 mg by mouth daily  Patient not taking: Reported on 11/21/2024    Historical Provider, MD   divalproex sodium (DEPAKOTE ER) 250 mg 24 hr tablet Take 250 mg by mouth daily    Historical Provider, MD   OLANZapine (ZyPREXA) 5 mg tablet Take 5 mg by mouth daily at bedtime    Historical Provider, MD   OLANZapine (ZyPREXA) 7.5 mg tablet Take 7.5 mg by mouth daily at bedtime  Patient not taking: Reported on 11/21/2024    Historical Provider, MD   divalproex sodium (DEPAKOTE ER) 250 mg 24 hr tablet Take 3 tablets (750 mg total) by mouth daily at bedtime 10/25/24 11/21/24  Aram Guerrero PA-C   OLANZapine (ZyPREXA) 5 mg tablet Take 1 tablet (5 mg total) by mouth daily at bedtime  Patient not taking: Reported on 11/21/2024 10/25/24 11/21/24  Aram Guerrero PA-C     Allergies   Allergen Reactions    Penicillins Anaphylaxis    Pineapple Extract - Food Allergy Hives       Objective :  Temp:  [98.1 °F (36.7 °C)-99.2 °F (37.3 °C)] 98.1 °F (36.7 °C)  HR:  [] 69  BP: ()/(59-85) 109/59  Resp:  [16-18] 16  SpO2:  [97 %-100 %] 99 %  O2 Device: None (Room air)    Height: 5' 8\" (172.7 cm) (11/21/24 1100)  Weight - Scale: 60.4 kg (133 lb 3.2 oz) (11/21/24 " "1100)  Physical Exam  Vitals and nursing note reviewed.   Constitutional:       Appearance: Normal appearance. He is normal weight.   HENT:      Head: Normocephalic and atraumatic.      Right Ear: Tympanic membrane normal.      Left Ear: Tympanic membrane normal.      Nose: Nose normal.      Mouth/Throat:      Mouth: Mucous membranes are dry.   Eyes:      Extraocular Movements: Extraocular movements intact.      Pupils: Pupils are equal, round, and reactive to light.   Cardiovascular:      Rate and Rhythm: Normal rate and regular rhythm.      Pulses: Normal pulses.      Heart sounds: Normal heart sounds.   Pulmonary:      Effort: Pulmonary effort is normal.      Breath sounds: Normal breath sounds.   Abdominal:      General: Abdomen is flat. Bowel sounds are normal.      Palpations: Abdomen is soft.   Musculoskeletal:         General: Normal range of motion.      Cervical back: Normal range of motion and neck supple.   Skin:     General: Skin is warm and dry.      Capillary Refill: Capillary refill takes 2 to 3 seconds.   Neurological:      Mental Status: He is alert and oriented to person, place, and time.   Psychiatric:         Behavior: Behavior normal.      Comments: Patient withdrawn, cooperative, very sad           Lab Results: I have reviewed the following results:                  No results found for: \"HGBA1C\"        Imaging Results Review: No pertinent imaging studies reviewed.      Administrative Statements   I have spent a total time of 45 minutes in caring for this patient on the day of the visit/encounter including Diagnostic results, Prognosis, Risks and benefits of tx options, Instructions for management, Patient and family education, Importance of tx compliance, Risk factor reductions, Impressions, Counseling / Coordination of care, Documenting in the medical record, Reviewing / ordering tests, medicine, procedures  , Obtaining or reviewing history  , and Communicating with other healthcare " professionals .  ** Please Note: This note has been constructed using a voice recognition system. **

## 2024-11-21 NOTE — ED NOTES
Eligibility Verification System checked - ([phone]).  Online system / automated system indicates: active Medicare Part A Only and [facility] (ID# [id number]).

## 2024-11-21 NOTE — ED NOTES
Called [facility] to add the unreleased Trichomonas testing to patients sample. This RN released order. Per [facility] they are able to still run the sample.  This RN called to notify patient that we would call with results.    ED Crisis introduced self to patient. Explained that we will not know bed status for several hours but will keep him informed.
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
## 2024-11-21 NOTE — NURSING NOTE
Reconciled PTA medication with [facility] pharmacy in [place] and [facility] pharmacy in [place]. Dr. Loyola notified.

## 2024-11-21 NOTE — ED NOTES
Patient was informed he was accepted to [facility] 3B. He stated he wanted to wait for SLQ. ED Crisis explained that placement process is based on a number of variables. Explained we are not permitted to hold in the ED when there is an appropriate bed available, as the determined level of treatment must be provided in a timely manner. When asked what his concerns were, he stated he wanted to engage in programs in the [place] area. Assured the patient that whatever was explored during his SLQ admission can be referenced by his [facility] treatment team. Patient appeared satisfied with explanation.

## 2024-11-21 NOTE — ED NOTES
Patient is accepted at [address].  Patient is accepted by Raman Loyola MD.     Patient may go to the floor at [facility].      Nurse report is to be called at [phone] to [phone] prior to patient transfer.

## 2024-11-21 NOTE — TREATMENT PLAN
TREATMENT PLAN REVIEW - Behavioral Health Raudel Bland 34 y.o. 1990 male MRN: [id number]    [facility] 3B BEHAVIORAL [facility] Room / Bed: [facility] 351/[facility] 351-01 Encounter: [id number]        Admit Date/Time:  11/20/2024  5:47 PM    Treatment Team:   MD Nkechi Garza DO Dabriel L Ramos, RN    Diagnosis: Principal Problem:    Mood disorder r/o MDD vs substance-induced mood disorder vs bipolar disorder vs intermittent explosive disorder  Active Problems:    Generalized anxiety disorder    Methamphetamine use (HCC)    Cannabis use disorder    Tobacco abuse      Patient Strengths/Assets: ability for insight, average or above intelligence, capable of independent living, communication skills, family ties, good past treatment response, good physical health, motivation for treatment/growth, negotiates basic needs, patient is on a voluntary commitment, patient is willing to work on problems      Patient Barriers/Limitations: chronic mental illness, family conflict, financial instability, lack of financial means, lack of stable employment, legal problems, marital conflict, relationship issues, no/few hobbies or interests, self-care deficit, substance abuse, unstable housing situation    Short Term Goals: decrease in depressive symptoms, decrease in anxiety symptoms, decrease in level of agitation, decrease in frequency of aggressive outbursts, ability to stay safe on the unit, ability to stay free from restraints, improvement in ability to express basic needs, improvement in insight, improvement in reality testing, improvement in reasoning ability, improvement in self care, improvement in impulse control, sleep improvement, improvement in appetite, tolerate medications, mood stabilization, increase in group attendance, increase in group participation, increase in socialization with peers on the unit, acceptance of need for psychiatric treatment, acceptance of psychiatric  medications    Long Term Goals: improvement in depression, improvement in anxiety, free of suicidal thoughts, free of homicidal thoughts, no self abusive behavior, improved impulse control, improved insight, no agitation on the unit, no aggressive behavior on the unit, able to express basic needs, acceptance of need for psychiatric medications, acceptance of need for psychiatric treatment, acceptance of need for psychiatric follow up after discharge, acceptance of psychiatric diagnosis, adequate self care, adequate sleep, adequate appetite, adequate oral intake, appropriate interaction with peers, appropriate interaction with family, stable living arrangements upon discharge, establishment of family support upon discharge    Progress Towards Goals: restarting psychiatric medications as prescribed    Recommended Treatment: medication management, patient medication education, group therapy, milieu therapy, continued Behavioral Health psychiatric evaluation/assessment process     Treatment Frequency: daily medication monitoring, group and milieu therapy daily, monitoring through interdisciplinary rounds, monitoring through weekly patient care conferences    Expected Discharge Date: 10 days - 12/1/2024    Discharge Plan: referrals as indicated, disposition to be determined     Treatment Plan Created/Updated By: Amy Orta DO

## 2024-11-22 LAB
25(OH)D3 SERPL-MCNC: 12.6 NG/ML (ref 30–100)
ALBUMIN SERPL BCG-MCNC: 4 G/DL (ref 3.5–5)
ALP SERPL-CCNC: 64 U/L (ref 34–104)
ALT SERPL W P-5'-P-CCNC: 11 U/L (ref 7–52)
ANION GAP SERPL CALCULATED.3IONS-SCNC: 5 MMOL/L (ref 4–13)
AST SERPL W P-5'-P-CCNC: 11 U/L (ref 13–39)
ATRIAL RATE: 67 BPM
ATRIAL RATE: 80 BPM
BACTERIA UR QL AUTO: ABNORMAL /HPF
BASOPHILS # BLD AUTO: 0.05 THOUSANDS/ÂΜL (ref 0–0.1)
BASOPHILS NFR BLD AUTO: 1 % (ref 0–1)
BILIRUB SERPL-MCNC: 0.27 MG/DL (ref 0.2–1)
BILIRUB UR QL STRIP: NEGATIVE
BUN SERPL-MCNC: 17 MG/DL (ref 5–25)
CALCIUM SERPL-MCNC: 9 MG/DL (ref 8.4–10.2)
CAOX CRY URNS QL MICRO: ABNORMAL /HPF
CHLORIDE SERPL-SCNC: 106 MMOL/L (ref 96–108)
CHOLEST SERPL-MCNC: 106 MG/DL (ref ?–200)
CLARITY UR: CLEAR
CO2 SERPL-SCNC: 32 MMOL/L (ref 21–32)
COLOR UR: ABNORMAL
CREAT SERPL-MCNC: 1.02 MG/DL (ref 0.6–1.3)
EOSINOPHIL # BLD AUTO: 0.25 THOUSAND/ÂΜL (ref 0–0.61)
EOSINOPHIL NFR BLD AUTO: 3 % (ref 0–6)
ERYTHROCYTE [DISTWIDTH] IN BLOOD BY AUTOMATED COUNT: 12.7 % (ref 11.6–15.1)
EST. AVERAGE GLUCOSE BLD GHB EST-MCNC: 105 MG/DL
FOLATE SERPL-MCNC: 7.3 NG/ML
GFR SERPL CREATININE-BSD FRML MDRD: 96 ML/MIN/1.73SQ M
GLUCOSE P FAST SERPL-MCNC: 92 MG/DL (ref 65–99)
GLUCOSE SERPL-MCNC: 92 MG/DL (ref 65–140)
GLUCOSE UR STRIP-MCNC: NEGATIVE MG/DL
HBA1C MFR BLD: 5.3 %
HCT VFR BLD AUTO: 40.2 % (ref 36.5–49.3)
HDLC SERPL-MCNC: 45 MG/DL
HGB BLD-MCNC: 13.3 G/DL (ref 12–17)
HGB UR QL STRIP.AUTO: NEGATIVE
IMM GRANULOCYTES # BLD AUTO: 0.04 THOUSAND/UL (ref 0–0.2)
IMM GRANULOCYTES NFR BLD AUTO: 0 % (ref 0–2)
KETONES UR STRIP-MCNC: NEGATIVE MG/DL
LDLC SERPL CALC-MCNC: 45 MG/DL (ref 0–100)
LEUKOCYTE ESTERASE UR QL STRIP: 25
LYMPHOCYTES # BLD AUTO: 2.93 THOUSANDS/ÂΜL (ref 0.6–4.47)
LYMPHOCYTES NFR BLD AUTO: 29 % (ref 14–44)
MAGNESIUM SERPL-MCNC: 2.1 MG/DL (ref 1.9–2.7)
MCH RBC QN AUTO: 32 PG (ref 26.8–34.3)
MCHC RBC AUTO-ENTMCNC: 33.1 G/DL (ref 31.4–37.4)
MCV RBC AUTO: 97 FL (ref 82–98)
MONOCYTES # BLD AUTO: 0.95 THOUSAND/ÂΜL (ref 0.17–1.22)
MONOCYTES NFR BLD AUTO: 10 % (ref 4–12)
MUCOUS THREADS UR QL AUTO: ABNORMAL
NEUTROPHILS # BLD AUTO: 5.82 THOUSANDS/ÂΜL (ref 1.85–7.62)
NEUTS SEG NFR BLD AUTO: 57 % (ref 43–75)
NITRITE UR QL STRIP: NEGATIVE
NON-SQ EPI CELLS URNS QL MICRO: ABNORMAL /HPF
NONHDLC SERPL-MCNC: 61 MG/DL
NRBC BLD AUTO-RTO: 0 /100 WBCS
P AXIS: 68 DEGREES
P AXIS: 79 DEGREES
PH UR STRIP.AUTO: 6 [PH]
PHOSPHATE SERPL-MCNC: 3.9 MG/DL (ref 2.7–4.5)
PLATELET # BLD AUTO: 207 THOUSANDS/UL (ref 149–390)
PMV BLD AUTO: 10.6 FL (ref 8.9–12.7)
POTASSIUM SERPL-SCNC: 4.1 MMOL/L (ref 3.5–5.3)
PR INTERVAL: 124 MS
PR INTERVAL: 126 MS
PROT SERPL-MCNC: 6.4 G/DL (ref 6.4–8.4)
PROT UR STRIP-MCNC: ABNORMAL MG/DL
QRS AXIS: 81 DEGREES
QRS AXIS: 82 DEGREES
QRSD INTERVAL: 80 MS
QRSD INTERVAL: 96 MS
QT INTERVAL: 378 MS
QT INTERVAL: 382 MS
QTC INTERVAL: 404 MS
QTC INTERVAL: 436 MS
RBC # BLD AUTO: 4.15 MILLION/UL (ref 3.88–5.62)
RBC #/AREA URNS AUTO: ABNORMAL /HPF
SODIUM SERPL-SCNC: 143 MMOL/L (ref 135–147)
SP GR UR STRIP.AUTO: 1.02 (ref 1–1.04)
T WAVE AXIS: 62 DEGREES
T WAVE AXIS: 73 DEGREES
TREPONEMA PALLIDUM IGG+IGM AB [PRESENCE] IN SERUM OR PLASMA BY IMMUNOASSAY: NORMAL
TRIGL SERPL-MCNC: 78 MG/DL (ref ?–150)
TSH SERPL DL<=0.05 MIU/L-ACNC: 1 UIU/ML (ref 0.45–4.5)
UROBILINOGEN UA: NEGATIVE MG/DL
VENTRICULAR RATE: 67 BPM
VENTRICULAR RATE: 80 BPM
VIT B12 SERPL-MCNC: 185 PG/ML (ref 180–914)
WBC # BLD AUTO: 10.04 THOUSAND/UL (ref 4.31–10.16)
WBC #/AREA URNS AUTO: ABNORMAL /HPF

## 2024-11-22 PROCEDURE — 84443 ASSAY THYROID STIM HORMONE: CPT

## 2024-11-22 PROCEDURE — 85025 COMPLETE CBC W/AUTO DIFF WBC: CPT

## 2024-11-22 PROCEDURE — 83036 HEMOGLOBIN GLYCOSYLATED A1C: CPT

## 2024-11-22 PROCEDURE — 80061 LIPID PANEL: CPT

## 2024-11-22 PROCEDURE — 86780 TREPONEMA PALLIDUM: CPT

## 2024-11-22 PROCEDURE — 93005 ELECTROCARDIOGRAM TRACING: CPT

## 2024-11-22 PROCEDURE — 82746 ASSAY OF FOLIC ACID SERUM: CPT

## 2024-11-22 PROCEDURE — 83735 ASSAY OF MAGNESIUM: CPT

## 2024-11-22 PROCEDURE — 81001 URINALYSIS AUTO W/SCOPE: CPT

## 2024-11-22 PROCEDURE — 82306 VITAMIN D 25 HYDROXY: CPT

## 2024-11-22 PROCEDURE — 82607 VITAMIN B-12: CPT

## 2024-11-22 PROCEDURE — 93010 ELECTROCARDIOGRAM REPORT: CPT | Performed by: INTERNAL MEDICINE

## 2024-11-22 PROCEDURE — 80053 COMPREHEN METABOLIC PANEL: CPT

## 2024-11-22 PROCEDURE — 84100 ASSAY OF PHOSPHORUS: CPT

## 2024-11-22 PROCEDURE — 99232 SBSQ HOSP IP/OBS MODERATE 35: CPT | Performed by: PSYCHIATRY & NEUROLOGY

## 2024-11-22 RX ORDER — CYANOCOBALAMIN 1000 UG/ML
1000 INJECTION, SOLUTION INTRAMUSCULAR; SUBCUTANEOUS ONCE
Status: DISCONTINUED | OUTPATIENT
Start: 2024-11-23 | End: 2024-12-04 | Stop reason: HOSPADM

## 2024-11-22 RX ORDER — FOLIC ACID 1 MG/1
1 TABLET ORAL DAILY
Status: DISCONTINUED | OUTPATIENT
Start: 2024-11-23 | End: 2024-12-04 | Stop reason: HOSPADM

## 2024-11-22 RX ORDER — ERGOCALCIFEROL 1.25 MG/1
50000 CAPSULE, LIQUID FILLED ORAL WEEKLY
Status: DISCONTINUED | OUTPATIENT
Start: 2024-11-23 | End: 2024-12-04 | Stop reason: HOSPADM

## 2024-11-22 RX ADMIN — SERTRALINE HYDROCHLORIDE 50 MG: 50 TABLET ORAL at 08:23

## 2024-11-22 RX ADMIN — GABAPENTIN 300 MG: 300 CAPSULE ORAL at 08:23

## 2024-11-22 RX ADMIN — Medication 1 PATCH: at 08:24

## 2024-11-22 RX ADMIN — GABAPENTIN 300 MG: 300 CAPSULE ORAL at 21:26

## 2024-11-22 RX ADMIN — GABAPENTIN 300 MG: 300 CAPSULE ORAL at 17:49

## 2024-11-22 RX ADMIN — HYDROXYZINE HYDROCHLORIDE 100 MG: 50 TABLET, FILM COATED ORAL at 11:26

## 2024-11-22 RX ADMIN — DIVALPROEX SODIUM 750 MG: 500 TABLET, EXTENDED RELEASE ORAL at 21:27

## 2024-11-22 RX ADMIN — NICOTINE POLACRILEX 2 MG: 2 GUM, CHEWING BUCCAL at 08:53

## 2024-11-22 RX ADMIN — TRAZODONE HYDROCHLORIDE 50 MG: 50 TABLET ORAL at 21:32

## 2024-11-22 RX ADMIN — NICOTINE POLACRILEX 2 MG: 2 GUM, CHEWING BUCCAL at 10:45

## 2024-11-22 RX ADMIN — MELATONIN TAB 3 MG 3 MG: 3 TAB at 21:27

## 2024-11-22 RX ADMIN — NICOTINE POLACRILEX 2 MG: 2 GUM, CHEWING BUCCAL at 17:51

## 2024-11-22 NOTE — NURSING NOTE
Patient came to nurses station and reported severe anxiety.  Patient given 100 mg PO PRN of Atarax.

## 2024-11-22 NOTE — NURSING NOTE
Patient has been visible on the unit talking on the phone with his significant other.  He was overheard cursing at her and becoming agitated.  Patient was redirected regarding his language.  Patient was med/meal compliant and denies SI/HI and A/V hallucinations.
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
## 2024-11-22 NOTE — PLAN OF CARE
Problem: SELF HARM/SUICIDALITY  Goal: Will have no self-injury during hospital stay  Description: INTERVENTIONS:  - Q 15 MINUTES: Routine safety checks  - Q WAKING SHIFT & PRN: Assess risk to determine if routine checks are adequate to maintain patient safety  - Encourage patient to participate actively in care by formulating a plan to combat response to suicidal ideation, identify supports and resources  Outcome: Progressing     Problem: DEPRESSION  Goal: Will be euthymic at discharge  Description: INTERVENTIONS:  - Administer medication as ordered  - Provide emotional support via 1:1 interaction with staff  - Encourage involvement in milieu/groups/activities  - Monitor for social isolation  Outcome: Progressing     Problem: SUBSTANCE USE/ABUSE  Goal: Will have no detox symptoms and will verbalize plan for changing substance-related behavior  Description: INTERVENTIONS:  - Monitor physical status and assess for symptoms of withdrawal  - Administer medication as ordered  - Provide emotional support with 1 on 1 interaction with staff  - Encourage recovery focused program/ addiction education  - Assess for verbalization of changing behaviors related to substance abuse  - Initiate consults and referrals as appropriate (Case Management, Spiritual Care, etc.)  Outcome: Progressing  Goal: By discharge, will develop insight into their chemical dependency and sustain motivation to continue in recovery  Description: INTERVENTIONS:  - Attends all daily group sessions and scheduled AA groups  - Actively practices coping skills through participation in the therapeutic community and adherence to program rules  - Reviews and completes assignments from individual treatment plan  - Assist patient development of understanding of their personal cycle of addiction and relapse triggers  Outcome: Progressing  Goal: By discharge, patient will have ongoing treatment plan addressing chemical dependency  Description: INTERVENTIONS:  -  Assist patient with resources and/or appointments for ongoing recovery based living  Outcome: Progressing
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
## 2024-11-22 NOTE — PLAN OF CARE
Problem: Ineffective Coping  Goal: Cooperates with admission process  Description: Interventions:   - Complete admission process  Outcome: Not Progressing  Goal: Identifies ineffective coping skills  Outcome: Not Progressing  Goal: Identifies healthy coping skills  Outcome: Not Progressing  Goal: Demonstrates healthy coping skills  Outcome: Not Progressing  Goal: Participates in unit activities  Description: Interventions:  - Provide therapeutic environment   - Provide required programming   - Redirect inappropriate behaviors   Outcome: Not Progressing

## 2024-11-22 NOTE — ASSESSMENT & PLAN NOTE
Differentials at this time: major depressive disorder versus substance-induced mood disorder versus bipolar disorder versus intermittent explosive disorder - patient screens positive for depression, however, it is unclear if the mood fluctuations he describes are consistent with gianna/hypomania as there was often co-occurring substance use and symptomatology described vaguely. There does appear to be difficulty controlling anger with outbursts that are often out of proportion to the situation at hand, consistent with IED    Raudel remains calm and cooperative, visible in the milieu, social with staff and peers. Compliant with medications and meals. He is visible talking to his significant other on phone and overheard cursing and becoming agitated. He was redirected regarding his language.      Medication regimen as follow, no changes at this time:  Continue Depakote  mg at bed time for mood stabilization and irritability  Depakote level, CBC with diff, and CMP ordered for 11/24/2024   Continue Zoloft 50 mg daily for depression and anxiety   Continue Gabapentin 300 mg three times daily for anxiety and agitation   Continue Melatonin 3 mg at bedtime for insomnia  No new medication changes as of 11/22/24.

## 2024-11-22 NOTE — NURSING NOTE
"Pt is calm and cooperative. Visible in the milieu, social with staff and peers. Pt reports feeling \"a little depressed\". Denies SI, HI, AH, VH, and pain. Compliant with medication and snack.     2151: PRN trazodone 50mg administered upon request for insomnia.     2251: Pt observed resting with no outward signs of distress.      Pt denied unmet needs before going to bed.   "

## 2024-11-22 NOTE — QUICK NOTE
Admission labs were reviewed by myself the following are abnormal B12 185 started on B12 supplementation.  Folate 7.3 started on folic acid.  Vitamin D 12.6 started on ergocalciferol.

## 2024-11-23 PROCEDURE — 99232 SBSQ HOSP IP/OBS MODERATE 35: CPT | Performed by: PSYCHIATRY & NEUROLOGY

## 2024-11-23 RX ADMIN — FOLIC ACID 1 MG: 1 TABLET ORAL at 09:32

## 2024-11-23 RX ADMIN — GABAPENTIN 300 MG: 300 CAPSULE ORAL at 16:56

## 2024-11-23 RX ADMIN — NICOTINE POLACRILEX 2 MG: 2 GUM, CHEWING BUCCAL at 11:03

## 2024-11-23 RX ADMIN — SERTRALINE HYDROCHLORIDE 50 MG: 50 TABLET ORAL at 09:32

## 2024-11-23 RX ADMIN — GABAPENTIN 300 MG: 300 CAPSULE ORAL at 09:32

## 2024-11-23 RX ADMIN — DIVALPROEX SODIUM 750 MG: 500 TABLET, EXTENDED RELEASE ORAL at 21:03

## 2024-11-23 RX ADMIN — NICOTINE POLACRILEX 2 MG: 2 GUM, CHEWING BUCCAL at 14:21

## 2024-11-23 RX ADMIN — ERGOCALCIFEROL 50000 UNITS: 1.25 CAPSULE ORAL at 09:32

## 2024-11-23 RX ADMIN — HYDROXYZINE HYDROCHLORIDE 50 MG: 50 TABLET, FILM COATED ORAL at 16:56

## 2024-11-23 RX ADMIN — MELATONIN TAB 3 MG 3 MG: 3 TAB at 21:03

## 2024-11-23 RX ADMIN — TRAZODONE HYDROCHLORIDE 50 MG: 50 TABLET ORAL at 21:03

## 2024-11-23 RX ADMIN — GABAPENTIN 300 MG: 300 CAPSULE ORAL at 21:03

## 2024-11-23 RX ADMIN — Medication 1 PATCH: at 09:32

## 2024-11-23 RX ADMIN — NICOTINE POLACRILEX 2 MG: 2 GUM, CHEWING BUCCAL at 19:48

## 2024-11-23 NOTE — ASSESSMENT & PLAN NOTE
Encourage cessation   Supportive care on the unit     No associated orders from this encounter found during lookback period of 72 hours.

## 2024-11-23 NOTE — PROGRESS NOTES
Psychiatric Progress Note - Department of Behavioral Health   Raudel Bland 34 y.o. male MRN: [id number]  Unit/Bed#: U 351-01 Encounter: [id number]    ASSESSMENT & PLAN     Assessment & Plan  Mood disorder r/o MDD vs substance-induced mood disorder vs bipolar disorder vs intermittent explosive disorder  Differentials at this time: major depressive disorder versus substance-induced mood disorder versus bipolar disorder versus intermittent explosive disorder - patient screens positive for depression, however, it is unclear if the mood fluctuations he describes are consistent with gianna/hypomania as there was often co-occurring substance use and symptomatology described vaguely. There does appear to be difficulty controlling anger with outbursts that are often out of proportion to the situation at hand, consistent with IED    Raudel remains calm and cooperative, visible in the milieu, social with staff and peers. Compliant with medications and meals. He is visible talking to his significant other on phone and overheard cursing and becoming agitated. He was redirected regarding his language.      Medication regimen as follow, no changes at this time:  Continue Depakote  mg at bed time for mood stabilization and irritability  Depakote level, CBC with diff, and CMP ordered for 11/24/2024   Continue Zoloft 50 mg daily for depression and anxiety   Continue Gabapentin 300 mg three times daily for anxiety and agitation   Continue Melatonin 3 mg at bedtime for insomnia  No new medication changes as of 11/22/24.    No associated orders from this encounter found during lookback period of 72 hours.  Generalized anxiety disorder  See principal problem    No associated orders from this encounter found during lookback period of 72 hours.  Methamphetamine use (HCC)  Encourage cessation   Supportive care on the unit     No associated orders from this encounter found during lookback period of 72 hours.  Cannabis use  disorder  Encourage cessation   Supportive care on the unit     No associated orders from this encounter found during lookback period of 72 hours.  Tobacco abuse  Encourage cessation   Nicotine patch 14 mg patch daily   Nicotine gum prn    No associated orders from this encounter found during lookback period of 72 hours.  Medical clearance for psychiatric admission    Orders from past 72 hours:    Inpatient consult for Medical Clearance for  patient; Standing    Housing situation unstable    No associated orders from this encounter found during lookback period of 72 hours.  Noncompliance    No associated orders from this encounter found during lookback period of 72 hours.    Treatment Recommendations/Precautions:  Continue to promote patient participation in therapeutic milieu.  Continue medical management per medicine.  Continue previously prescribed psychotropic medication regimen; see below.  Continue behavioral health checks q.15 minutes.   Continue vitals per behavioral health unit protocol.  Discharge date per primary team; 201 commitment status.    SUBJECTIVE     Patient evaluated this a.m. for continuity of care. Patient was discussed at length with nursing and treatment team. Per nursing, patient remains predominately calm and cooperative, present in the milieu, adherent to his medications without any acute adverse effects. No acute distress is noted throughout evaluation. Raudel Bland denies suicidal/homicidal ideation in addition to thoughts of self-injury, receptive to crisis planning provided by this writer, contacting for safety in the inpatient setting, admitting to an ability to appropriately confide in staff including this writer.  Patient appears scant and sparse in interaction involving this writer, superficial, denying any/all psychiatric complaints/concerns despite previous complaints pertaining to depression and anxiety, perseverating on discharge disposition, appearing somewhat receptive  to psychoeducation and supportive therapy provided by this writer, agreeable to talk to primary psychiatrist on Monday    PSYCHIATRIC REVIEW OF SYSTEMS     Behavior over the last 24 hours: Unchanged  Sleep: Adequate  Appetite: Adequate  Medication side effects: None    REVIEW OF SYSTEMS   Review of systems: No complaints    OBJECTIVE     Vital Signs in Past 24 Hours:  Temp:  [96.9 °F (36.1 °C)-98 °F (36.7 °C)] 98 °F (36.7 °C)  HR:  [72-78] 77  BP: (126-139)/(58-80) 136/80  Resp:  [16-17] 16  SpO2:  [97 %-100 %] 100 %  O2 Device: None (Room air)    Intake/Output in Past 24 hours:  No intake/output data recorded.  No intake/output data recorded.        Laboratory Results:  I have personally reviewed all pertinent laboratory/tests results.  Most Recent Labs:   Lab Results   Component Value Date    WBC 10.04 11/22/2024    RBC 4.15 11/22/2024    HGB 13.3 11/22/2024    HCT 40.2 11/22/2024     11/22/2024    RDW 12.7 11/22/2024    NEUTROABS 5.82 11/22/2024    SODIUM 143 11/22/2024    K 4.1 11/22/2024     11/22/2024    CO2 32 11/22/2024    BUN 17 11/22/2024    CREATININE 1.02 11/22/2024    GLUC 92 11/22/2024    GLUF 92 11/22/2024    CALCIUM 9.0 11/22/2024    AST 11 (L) 11/22/2024    ALT 11 11/22/2024    ALKPHOS 64 11/22/2024    TP 6.4 11/22/2024    ALB 4.0 11/22/2024    TBILI 0.27 11/22/2024    CHOLESTEROL 106 11/22/2024    HDL 45 11/22/2024    TRIG 78 11/22/2024    LDLCALC 45 11/22/2024    NONHDLC 61 11/22/2024    VALPROICTOT 51 10/24/2024    [id number] 1.000 11/22/2024    HGBA1C 5.3 11/22/2024     11/22/2024       Behavioral Health Medications: all current active meds have been reviewed and current meds:   Current Facility-Administered Medications:     acetaminophen (TYLENOL) tablet 650 mg, Q6H PRN    aluminum-magnesium hydroxide-simethicone (MAALOX) oral suspension 30 mL, Q4H PRN    benztropine (COGENTIN) injection 1 mg, Q4H PRN Max 6/day    benztropine (COGENTIN) tablet 1 mg, Q4H PRN Max 6/day     [START ON 11/24/2024] cyanocobalamin (VITAMIN B-12) tablet 1,000 mcg, Daily    cyanocobalamin injection 1,000 mcg, Once    hydrOXYzine HCL (ATARAX) tablet 50 mg, Q6H PRN Max 4/day **OR** diphenhydrAMINE (BENADRYL) injection 50 mg, Q6H PRN    divalproex sodium (DEPAKOTE ER) 24 hr tablet 750 mg, HS    ergocalciferol (VITAMIN D2) capsule 50,000 Units, Weekly    folic acid (FOLVITE) tablet 1 mg, Daily    gabapentin (NEURONTIN) capsule 300 mg, TID    hydrOXYzine HCL (ATARAX) tablet 100 mg, Q6H PRN Max 4/day **OR** LORazepam (ATIVAN) injection 2 mg, Q6H PRN    hydrOXYzine HCL (ATARAX) tablet 25 mg, Q6H PRN Max 4/day    ibuprofen (MOTRIN) tablet 400 mg, Q6H PRN    ibuprofen (MOTRIN) tablet 600 mg, Q8H PRN    melatonin tablet 3 mg, HS    nicotine (NICODERM CQ) 14 mg/24hr TD 24 hr patch 1 patch, Daily    nicotine polacrilex (NICORETTE) gum 2 mg, Q2H PRN    OLANZapine (ZyPREXA) tablet 10 mg, Q3H PRN Max 3/day **OR** OLANZapine (ZyPREXA) IM injection 10 mg, Q3H PRN Max 3/day    OLANZapine (ZyPREXA) tablet 5 mg, Q3H PRN Max 6/day **OR** OLANZapine (ZyPREXA) IM injection 5 mg, Q3H PRN Max 6/day    OLANZapine (ZyPREXA) tablet 2.5 mg, Q3H PRN Max 8/day    polyethylene glycol (MIRALAX) packet 17 g, Daily PRN    propranolol (INDERAL) tablet 10 mg, Q8H PRN    senna-docusate sodium (SENOKOT S) 8.6-50 mg per tablet 1 tablet, Daily PRN    sertraline (ZOLOFT) tablet 50 mg, Daily    traZODone (DESYREL) tablet 50 mg, HS PRN.    Risks, benefits and possible side effects of Medications:   Risks, benefits, and possible side effects of medications explained to patient and patient verbalizes understanding.      Mental Status Evaluation:  Appearance:  age appropriate, casually dressed, and somewhat disheveled   Behavior:  psychomotor retardation, superficial with intermittent eye contact   Speech:  normal pitch and normal volume   Mood:  euthymic   Affect:  constricted and mood-incongruent   Language sparse   Thought Process:  goal directed    Thought Content:  no overt obsessions or delusions, negative thinking at times   Perceptual Disturbances: None   Risk Potential: Suicidal Ideations none, Homicidal Ideations none, and Potential for Aggression No   Sensorium:  person, place, and time/date   Cognition:  recent and remote memory grossly intact   Consciousness:  alert and awake    Attention: attention span appeared shorter than expected for age   Insight:  limited   Judgment: limited   Intellect Not assessed   Gait/Station: normal gait/station and normal balance   Motor Activity: no abnormal movements     Memory: Short and long term memory fair     Progress Toward Goals: Unchanged, as evidenced by their participation (or lack thereof) in individual, social and therapeutic milieu in addition to adherence to their medication regimen.  Recommended Treatment:   See above for assessment and plan.    Inpatient Psychiatric Certification: Based upon physical, mental and social evaluations, I certify that inpatient psychiatric services are medically necessary for this patient for a duration of greater than 2 midnights for the treatment of Unspecified mood (affective) disorder (HCC) including psychotropic medication management, participation in the therapeutic milieu and referrals as indicated. Available alternative community resources do not meet the patient's mental health care needs. I further attest that an established written individualized plan of care has been implemented and is outlined in the patient's medical records.    Counseling/Coordination of Care    I have expended greater than 15 minutes in which more than 50% of this time was expended in counseling/coordination of patient care relating to diagnostic results, prognosis, potential risks and benefits of management options, instructions for appropriate management, patient and/or collateral education, importance of adherence to management and/or risk factor reductions. Patient's rights,  confidentiality, exceptions to confidentiality, use of electronic medical record including appropriate staff access to medical record regarding behavioral health services and consent to treatment were reviewed.    Note Share:     This note was not shared with the patient due to reasonable likelihood of causing patient harm     This note has been constructed using a voice recognition system. There may be translation, syntax,  or grammatical errors. If you have any questions, please contact the dictating provider.    Jordan Christopher Holter, DO 11/23/24

## 2024-11-23 NOTE — PLAN OF CARE
Problem: DISCHARGE PLANNING  Goal: Discharge to home or other facility with appropriate resources  Description: INTERVENTIONS:  - Identify barriers to discharge w/patient and caregiver  - Arrange for needed discharge resources and transportation as appropriate  - Identify discharge learning needs (meds, wound care, etc.)  - Arrange for interpretive services to assist at discharge as needed  - Refer to Case Management Department for coordinating discharge planning if the patient needs post-hospital services based on physician/advanced practitioner order or complex needs related to functional status, cognitive ability, or social support system  Outcome: Progressing     Problem: SELF HARM/SUICIDALITY  Goal: Will have no self-injury during hospital stay  Description: INTERVENTIONS:  - Q 15 MINUTES: Routine safety checks  - Q WAKING SHIFT & PRN: Assess risk to determine if routine checks are adequate to maintain patient safety  - Encourage patient to participate actively in care by formulating a plan to combat response to suicidal ideation, identify supports and resources  Outcome: Progressing     Problem: DEPRESSION  Goal: Will be euthymic at discharge  Description: INTERVENTIONS:  - Administer medication as ordered  - Provide emotional support via 1:1 interaction with staff  - Encourage involvement in milieu/groups/activities  - Monitor for social isolation  Outcome: Progressing     Problem: SUBSTANCE USE/ABUSE  Goal: Will have no detox symptoms and will verbalize plan for changing substance-related behavior  Description: INTERVENTIONS:  - Monitor physical status and assess for symptoms of withdrawal  - Administer medication as ordered  - Provide emotional support with 1 on 1 interaction with staff  - Encourage recovery focused program/ addiction education  - Assess for verbalization of changing behaviors related to substance abuse  - Initiate consults and referrals as appropriate (Case Management, Spiritual Care,  etc.)  Outcome: Progressing  Goal: By discharge, will develop insight into their chemical dependency and sustain motivation to continue in recovery  Description: INTERVENTIONS:  - Attends all daily group sessions and scheduled AA groups  - Actively practices coping skills through participation in the therapeutic community and adherence to program rules  - Reviews and completes assignments from individual treatment plan  - Assist patient development of understanding of their personal cycle of addiction and relapse triggers  Outcome: Progressing  Goal: By discharge, patient will have ongoing treatment plan addressing chemical dependency  Description: INTERVENTIONS:  - Assist patient with resources and/or appointments for ongoing recovery based living  Outcome: Progressing     Problem: Ineffective Coping  Goal: Cooperates with admission process  Description: Interventions:   - Complete admission process  Outcome: Progressing  Goal: Identifies ineffective coping skills  Outcome: Progressing  Goal: Identifies healthy coping skills  Outcome: Progressing  Goal: Demonstrates healthy coping skills  Outcome: Progressing     Problem: Ineffective Coping  Goal: Participates in unit activities  Description: Interventions:  - Provide therapeutic environment   - Provide required programming   - Redirect inappropriate behaviors   Outcome: Not Progressing

## 2024-11-23 NOTE — ASSESSMENT & PLAN NOTE
Encourage cessation   Nicotine patch 14 mg patch daily   Nicotine gum prn    No associated orders from this encounter found during lookback period of 72 hours.

## 2024-11-23 NOTE — ASSESSMENT & PLAN NOTE
See principal problem    No associated orders from this encounter found during lookback period of 72 hours.

## 2024-11-23 NOTE — ASSESSMENT & PLAN NOTE
Differentials at this time: major depressive disorder versus substance-induced mood disorder versus bipolar disorder versus intermittent explosive disorder - patient screens positive for depression, however, it is unclear if the mood fluctuations he describes are consistent with gianna/hypomania as there was often co-occurring substance use and symptomatology described vaguely. There does appear to be difficulty controlling anger with outbursts that are often out of proportion to the situation at hand, consistent with IED    Raudel remains calm and cooperative, visible in the milieu, social with staff and peers. Compliant with medications and meals. He is visible talking to his significant other on phone and overheard cursing and becoming agitated. He was redirected regarding his language.      Medication regimen as follow, no changes at this time:  Continue Depakote  mg at bed time for mood stabilization and irritability  Depakote level, CBC with diff, and CMP ordered for 11/24/2024   Continue Zoloft 50 mg daily for depression and anxiety   Continue Gabapentin 300 mg three times daily for anxiety and agitation   Continue Melatonin 3 mg at bedtime for insomnia  No new medication changes as of 11/22/24.    No associated orders from this encounter found during lookback period of 72 hours.

## 2024-11-23 NOTE — NURSING NOTE
Patient has been out on the unit and med/meal compliant.  Patient attended group and was appropriate.  Patient is social.  He denies SI/HI and A/V hallucinations.

## 2024-11-23 NOTE — NURSING NOTE
Patient has been in the milieu all evening, socializing with other male peers, using the telephone and following along with unit routine. He was cooperative with HS medications and requested and received Trazadone 50mgs po prn insomnia at 2132.

## 2024-11-24 LAB
ALBUMIN SERPL BCG-MCNC: 4.3 G/DL (ref 3.5–5)
ALP SERPL-CCNC: 75 U/L (ref 34–104)
ALT SERPL W P-5'-P-CCNC: 13 U/L (ref 7–52)
ANION GAP SERPL CALCULATED.3IONS-SCNC: 9 MMOL/L (ref 4–13)
AST SERPL W P-5'-P-CCNC: 11 U/L (ref 13–39)
BASOPHILS # BLD AUTO: 0.05 THOUSANDS/ΜL (ref 0–0.1)
BASOPHILS NFR BLD AUTO: 1 % (ref 0–1)
BILIRUB SERPL-MCNC: 0.21 MG/DL (ref 0.2–1)
BUN SERPL-MCNC: 21 MG/DL (ref 5–25)
CALCIUM SERPL-MCNC: 8.9 MG/DL (ref 8.4–10.2)
CHLORIDE SERPL-SCNC: 105 MMOL/L (ref 96–108)
CO2 SERPL-SCNC: 28 MMOL/L (ref 21–32)
CREAT SERPL-MCNC: 1 MG/DL (ref 0.6–1.3)
EOSINOPHIL # BLD AUTO: 0.26 THOUSAND/ΜL (ref 0–0.61)
EOSINOPHIL NFR BLD AUTO: 3 % (ref 0–6)
ERYTHROCYTE [DISTWIDTH] IN BLOOD BY AUTOMATED COUNT: 12.5 % (ref 11.6–15.1)
GFR SERPL CREATININE-BSD FRML MDRD: 98 ML/MIN/1.73SQ M
GLUCOSE SERPL-MCNC: 114 MG/DL (ref 65–140)
HCT VFR BLD AUTO: 39.1 % (ref 36.5–49.3)
HGB BLD-MCNC: 13.3 G/DL (ref 12–17)
IMM GRANULOCYTES # BLD AUTO: 0.03 THOUSAND/UL (ref 0–0.2)
IMM GRANULOCYTES NFR BLD AUTO: 0 % (ref 0–2)
LYMPHOCYTES # BLD AUTO: 2.7 THOUSANDS/ΜL (ref 0.6–4.47)
LYMPHOCYTES NFR BLD AUTO: 30 % (ref 14–44)
MCH RBC QN AUTO: 32.6 PG (ref 26.8–34.3)
MCHC RBC AUTO-ENTMCNC: 34 G/DL (ref 31.4–37.4)
MCV RBC AUTO: 96 FL (ref 82–98)
MONOCYTES # BLD AUTO: 0.76 THOUSAND/ΜL (ref 0.17–1.22)
MONOCYTES NFR BLD AUTO: 8 % (ref 4–12)
NEUTROPHILS # BLD AUTO: 5.23 THOUSANDS/ΜL (ref 1.85–7.62)
NEUTS SEG NFR BLD AUTO: 58 % (ref 43–75)
NRBC BLD AUTO-RTO: 0 /100 WBCS
PLATELET # BLD AUTO: 203 THOUSANDS/UL (ref 149–390)
PMV BLD AUTO: 10.7 FL (ref 8.9–12.7)
POTASSIUM SERPL-SCNC: 3.9 MMOL/L (ref 3.5–5.3)
PROT SERPL-MCNC: 6.7 G/DL (ref 6.4–8.4)
RBC # BLD AUTO: 4.08 MILLION/UL (ref 3.88–5.62)
SODIUM SERPL-SCNC: 142 MMOL/L (ref 135–147)
VALPROATE SERPL-MCNC: 51 UG/ML (ref 50–100)
WBC # BLD AUTO: 9.03 THOUSAND/UL (ref 4.31–10.16)

## 2024-11-24 PROCEDURE — 99232 SBSQ HOSP IP/OBS MODERATE 35: CPT | Performed by: PSYCHIATRY & NEUROLOGY

## 2024-11-24 PROCEDURE — 80164 ASSAY DIPROPYLACETIC ACD TOT: CPT

## 2024-11-24 PROCEDURE — 80053 COMPREHEN METABOLIC PANEL: CPT

## 2024-11-24 PROCEDURE — 85025 COMPLETE CBC W/AUTO DIFF WBC: CPT

## 2024-11-24 RX ADMIN — GABAPENTIN 300 MG: 300 CAPSULE ORAL at 21:07

## 2024-11-24 RX ADMIN — NICOTINE POLACRILEX 2 MG: 2 GUM, CHEWING BUCCAL at 15:40

## 2024-11-24 RX ADMIN — MELATONIN TAB 3 MG 3 MG: 3 TAB at 21:07

## 2024-11-24 RX ADMIN — CYANOCOBALAMIN TAB 1000 MCG 1000 MCG: 1000 TAB at 08:14

## 2024-11-24 RX ADMIN — HYDROXYZINE HYDROCHLORIDE 100 MG: 50 TABLET, FILM COATED ORAL at 11:43

## 2024-11-24 RX ADMIN — DIVALPROEX SODIUM 750 MG: 500 TABLET, EXTENDED RELEASE ORAL at 21:07

## 2024-11-24 RX ADMIN — NICOTINE POLACRILEX 2 MG: 2 GUM, CHEWING BUCCAL at 08:43

## 2024-11-24 RX ADMIN — GABAPENTIN 300 MG: 300 CAPSULE ORAL at 16:02

## 2024-11-24 RX ADMIN — NICOTINE POLACRILEX 2 MG: 2 GUM, CHEWING BUCCAL at 11:45

## 2024-11-24 RX ADMIN — FOLIC ACID 1 MG: 1 TABLET ORAL at 08:14

## 2024-11-24 RX ADMIN — GABAPENTIN 300 MG: 300 CAPSULE ORAL at 08:14

## 2024-11-24 RX ADMIN — TRAZODONE HYDROCHLORIDE 50 MG: 50 TABLET ORAL at 21:07

## 2024-11-24 RX ADMIN — HYDROXYZINE HYDROCHLORIDE 100 MG: 50 TABLET, FILM COATED ORAL at 19:52

## 2024-11-24 RX ADMIN — SERTRALINE HYDROCHLORIDE 50 MG: 50 TABLET ORAL at 08:14

## 2024-11-24 RX ADMIN — Medication 1 PATCH: at 08:14

## 2024-11-24 RX ADMIN — NICOTINE POLACRILEX 2 MG: 2 GUM, CHEWING BUCCAL at 17:33

## 2024-11-24 NOTE — NURSING NOTE
Patient is very visible in the milieu, social with peers, sharing life experiences, giving advice to peers. He was cooperative with  scheduled medications and also requested and received Trazadone 50mgs po prn insomnia at 2103.  He has bloodwork scheduled for 2000 11/24.  He denies S.I.H.I.A/H V/H

## 2024-11-24 NOTE — PROGRESS NOTES
Psychiatric Progress Note - Department of Behavioral Health   Raudel Bland 34 y.o. male MRN: [id number]  Unit/Bed#: U 351-01 Encounter: [id number]    ASSESSMENT & PLAN     Assessment & Plan  Mood disorder r/o MDD vs substance-induced mood disorder vs bipolar disorder vs intermittent explosive disorder  Differentials at this time: major depressive disorder versus substance-induced mood disorder versus bipolar disorder versus intermittent explosive disorder - patient screens positive for depression, however, it is unclear if the mood fluctuations he describes are consistent with gianna/hypomania as there was often co-occurring substance use and symptomatology described vaguely. There does appear to be difficulty controlling anger with outbursts that are often out of proportion to the situation at hand, consistent with IED    Raudel remains calm and cooperative, visible in the milieu, social with staff and peers. Compliant with medications and meals. He is visible talking to his significant other on phone and overheard cursing and becoming agitated. He was redirected regarding his language.      Medication regimen as follow, no changes at this time:  Continue Depakote  mg at bed time for mood stabilization and irritability  Depakote level, CBC with diff, and CMP ordered for 11/24/2024   Continue Zoloft 50 mg daily for depression and anxiety   Continue Gabapentin 300 mg three times daily for anxiety and agitation   Continue Melatonin 3 mg at bedtime for insomnia  No new medication changes as of 11/22/24.    No associated orders from this encounter found during lookback period of 72 hours.  Generalized anxiety disorder  See principal problem    No associated orders from this encounter found during lookback period of 72 hours.  Methamphetamine use (HCC)  Encourage cessation   Supportive care on the unit     No associated orders from this encounter found during lookback period of 72 hours.  Cannabis use  disorder  Encourage cessation   Supportive care on the unit     No associated orders from this encounter found during lookback period of 72 hours.  Tobacco abuse  Encourage cessation   Nicotine patch 14 mg patch daily   Nicotine gum prn    No associated orders from this encounter found during lookback period of 72 hours.  Medical clearance for psychiatric admission    Orders from past 72 hours:    Inpatient consult for Medical Clearance for  patient; Standing    Housing situation unstable    No associated orders from this encounter found during lookback period of 72 hours.  Noncompliance    No associated orders from this encounter found during lookback period of 72 hours.    Treatment Recommendations/Precautions:  Continue to promote patient participation in therapeutic milieu.  Continue medical management per medicine.  Continue previously prescribed psychotropic medication regimen; see below.  Continue behavioral health checks q.15 minutes.   Continue vitals per behavioral health unit protocol.  Discharge date per primary team; 201 commitment status.    SUBJECTIVE     Patient evaluated this a.m. for continuity of care. Patient was discussed at length with nursing and treatment team. Per nursing, patient remains calm, cooperative, present in the milieu, adherent to his medications less any acute adverse effects. No acute distress is noted throughout evaluation. Raudel Bland denies suicidal/homicidal ideation in addition to thoughts of self-injury, receptive to crisis planning provided by this writer, contacting for safety in the inpatient setting, admitting to an ability to appropriately confide in staff including this writer.  Patient denies any/all psychiatric complaints/concerns despite previous complaints pertaining to dysphoric mood that includes depression and anxiety, remaining somewhat perseverative on discharge disposition, agreeable to talk to primary psychiatrist in the a.m.    PSYCHIATRIC REVIEW OF  SYSTEMS     Behavior over the last 24 hours: Unchanged  Sleep: Adequate  Appetite: Adequate  Medication side effects: None    REVIEW OF SYSTEMS   Review of systems: No complaints    OBJECTIVE     Vital Signs in Past 24 Hours:  Temp:  [97 °F (36.1 °C)-97.5 °F (36.4 °C)] 97 °F (36.1 °C)  HR:  [65-75] 65  BP: (111-148)/(57-76) 111/57  Resp:  [16] 16  SpO2:  [99 %-100 %] 99 %  O2 Device: None (Room air)    Intake/Output in Past 24 hours:  No intake/output data recorded.  No intake/output data recorded.        Laboratory Results:  I have personally reviewed all pertinent laboratory/tests results.    Behavioral Health Medications: all current active meds have been reviewed and current meds:   Current Facility-Administered Medications:     acetaminophen (TYLENOL) tablet 650 mg, Q6H PRN    aluminum-magnesium hydroxide-simethicone (MAALOX) oral suspension 30 mL, Q4H PRN    benztropine (COGENTIN) injection 1 mg, Q4H PRN Max 6/day    benztropine (COGENTIN) tablet 1 mg, Q4H PRN Max 6/day    cyanocobalamin (VITAMIN B-12) tablet 1,000 mcg, Daily    cyanocobalamin injection 1,000 mcg, Once    hydrOXYzine HCL (ATARAX) tablet 50 mg, Q6H PRN Max 4/day **OR** diphenhydrAMINE (BENADRYL) injection 50 mg, Q6H PRN    divalproex sodium (DEPAKOTE ER) 24 hr tablet 750 mg, HS    ergocalciferol (VITAMIN D2) capsule 50,000 Units, Weekly    folic acid (FOLVITE) tablet 1 mg, Daily    gabapentin (NEURONTIN) capsule 300 mg, TID    hydrOXYzine HCL (ATARAX) tablet 100 mg, Q6H PRN Max 4/day **OR** LORazepam (ATIVAN) injection 2 mg, Q6H PRN    hydrOXYzine HCL (ATARAX) tablet 25 mg, Q6H PRN Max 4/day    ibuprofen (MOTRIN) tablet 400 mg, Q6H PRN    ibuprofen (MOTRIN) tablet 600 mg, Q8H PRN    melatonin tablet 3 mg, HS    nicotine (NICODERM CQ) 14 mg/24hr TD 24 hr patch 1 patch, Daily    nicotine polacrilex (NICORETTE) gum 2 mg, Q2H PRN    OLANZapine (ZyPREXA) tablet 10 mg, Q3H PRN Max 3/day **OR** OLANZapine (ZyPREXA) IM injection 10 mg, Q3H PRN Max  3/day    OLANZapine (ZyPREXA) tablet 5 mg, Q3H PRN Max 6/day **OR** OLANZapine (ZyPREXA) IM injection 5 mg, Q3H PRN Max 6/day    OLANZapine (ZyPREXA) tablet 2.5 mg, Q3H PRN Max 8/day    polyethylene glycol (MIRALAX) packet 17 g, Daily PRN    propranolol (INDERAL) tablet 10 mg, Q8H PRN    senna-docusate sodium (SENOKOT S) 8.6-50 mg per tablet 1 tablet, Daily PRN    sertraline (ZOLOFT) tablet 50 mg, Daily    traZODone (DESYREL) tablet 50 mg, HS PRN.    Risks, benefits and possible side effects of Medications:   Risks, benefits, and possible side effects of medications explained to patient and patient verbalizes understanding.        Mental Status Evaluation:  Appearance:  age appropriate, casually dressed, and somewhat disheveled   Behavior:  psychomotor retardation   Speech:  normal pitch and normal volume   Mood:  euthymic   Affect:  constricted   Language sparse   Thought Process:  perserverative   Thought Content:  No overt obsessions or delusions   Perceptual Disturbances: None   Risk Potential: Suicidal Ideations none, Homicidal Ideations none, and Potential for Aggression No   Sensorium:  person, place, and time/date   Cognition:  recent and remote memory grossly intact   Consciousness:  alert and awake    Attention: attention span appeared shorter than expected for age   Insight:  limited   Judgment: limited   Intellect Not assessed   Gait/Station: normal gait/station and normal balance   Motor Activity: no abnormal movements     Memory: Short and long term memory fair     Progress Toward Goals: Unchanged, as evidenced by their participation (or lack thereof) in individual, social and therapeutic milieu in addition to adherence to their medication regimen.  Recommended Treatment:   See above for assessment and plan.    Inpatient Psychiatric Certification: Based upon physical, mental and social evaluations, I certify that inpatient psychiatric services are medically necessary for this patient for a duration of  greater than 2 midnights for the treatment of Unspecified mood (affective) disorder (HCC) including psychotropic medication management, participation in the therapeutic milieu and referrals as indicated. Available alternative community resources do not meet the patient's mental health care needs. I further attest that an established written individualized plan of care has been implemented and is outlined in the patient's medical records.    Counseling/Coordination of Care    I have expended greater than 15 minutes in which more than 50% of this time was expended in counseling/coordination of patient care relating to diagnostic results, prognosis, potential risks and benefits of management options, instructions for appropriate management, patient and/or collateral education, importance of adherence to management and/or risk factor reductions. Patient's rights, confidentiality, exceptions to confidentiality, use of electronic medical record including appropriate staff access to medical record regarding behavioral health services and consent to treatment were reviewed.    Note Share:     This note was not shared with the patient due to reasonable likelihood of causing patient harm     This note has been constructed using a voice recognition system. There may be translation, syntax,  or grammatical errors. If you have any questions, please contact the dictating provider.    Jordan Christopher Holter, DO 11/24/24

## 2024-11-24 NOTE — NURSING NOTE
Patient reported severe anxiety after speaking on the phone. HAMS score 26. PRN Atarax 100 mg PO for severe anxiety given

## 2024-11-24 NOTE — PLAN OF CARE
Problem: SELF HARM/SUICIDALITY  Goal: Will have no self-injury during hospital stay  Description: INTERVENTIONS:  - Q 15 MINUTES: Routine safety checks  - Q WAKING SHIFT & PRN: Assess risk to determine if routine checks are adequate to maintain patient safety  - Encourage patient to participate actively in care by formulating a plan to combat response to suicidal ideation, identify supports and resources  Outcome: Progressing     Problem: DEPRESSION  Goal: Will be euthymic at discharge  Description: INTERVENTIONS:  - Administer medication as ordered  - Provide emotional support via 1:1 interaction with staff  - Encourage involvement in milieu/groups/activities  - Monitor for social isolation  Outcome: Progressing     Problem: SUBSTANCE USE/ABUSE  Goal: Will have no detox symptoms and will verbalize plan for changing substance-related behavior  Description: INTERVENTIONS:  - Monitor physical status and assess for symptoms of withdrawal  - Administer medication as ordered  - Provide emotional support with 1 on 1 interaction with staff  - Encourage recovery focused program/ addiction education  - Assess for verbalization of changing behaviors related to substance abuse  - Initiate consults and referrals as appropriate (Case Management, Spiritual Care, etc.)  Outcome: Progressing  Goal: By discharge, will develop insight into their chemical dependency and sustain motivation to continue in recovery  Description: INTERVENTIONS:  - Attends all daily group sessions and scheduled AA groups  - Actively practices coping skills through participation in the therapeutic community and adherence to program rules  - Reviews and completes assignments from individual treatment plan  - Assist patient development of understanding of their personal cycle of addiction and relapse triggers  Outcome: Progressing  Goal: By discharge, patient will have ongoing treatment plan addressing chemical dependency  Description: INTERVENTIONS:  -  Assist patient with resources and/or appointments for ongoing recovery based living  Outcome: Progressing     Problem: Ineffective Coping  Goal: Cooperates with admission process  Description: Interventions:   - Complete admission process  Outcome: Progressing  Goal: Identifies ineffective coping skills  Outcome: Progressing  Goal: Identifies healthy coping skills  Outcome: Progressing  Goal: Demonstrates healthy coping skills  Outcome: Progressing  Goal: Participates in unit activities  Description: Interventions:  - Provide therapeutic environment   - Provide required programming   - Redirect inappropriate behaviors   Outcome: Progressing

## 2024-11-24 NOTE — NURSING NOTE
Patient is in bed currently and appears to be asleep - rythmic, even breathing, no restlessness. Trazadone 50mgs po prn effective.

## 2024-11-24 NOTE — NURSING NOTE
Patient is visible in patient lounge socializing with peers. No signs of anxiety or distress. PRN Atarax 100 mg PO for severe anxiety, effective at this time.

## 2024-11-24 NOTE — NURSING NOTE
Patient is calm and cooperative upon approach. Patient is visible on unit, socializing with peers. Patient denies SI/HI/AH/VH. Patient is compliant with meds and meals.

## 2024-11-25 PROCEDURE — 99232 SBSQ HOSP IP/OBS MODERATE 35: CPT | Performed by: PSYCHIATRY & NEUROLOGY

## 2024-11-25 RX ORDER — GABAPENTIN 400 MG/1
400 CAPSULE ORAL 3 TIMES DAILY
Status: DISCONTINUED | OUTPATIENT
Start: 2024-11-25 | End: 2024-12-04 | Stop reason: HOSPADM

## 2024-11-25 RX ADMIN — MELATONIN TAB 3 MG 3 MG: 3 TAB at 21:20

## 2024-11-25 RX ADMIN — CYANOCOBALAMIN TAB 1000 MCG 1000 MCG: 1000 TAB at 09:07

## 2024-11-25 RX ADMIN — GABAPENTIN 400 MG: 400 CAPSULE ORAL at 21:20

## 2024-11-25 RX ADMIN — GABAPENTIN 300 MG: 300 CAPSULE ORAL at 09:07

## 2024-11-25 RX ADMIN — NICOTINE POLACRILEX 2 MG: 2 GUM, CHEWING BUCCAL at 08:39

## 2024-11-25 RX ADMIN — OLANZAPINE 10 MG: 10 TABLET, FILM COATED ORAL at 15:32

## 2024-11-25 RX ADMIN — NICOTINE POLACRILEX 2 MG: 2 GUM, CHEWING BUCCAL at 14:35

## 2024-11-25 RX ADMIN — SERTRALINE HYDROCHLORIDE 50 MG: 50 TABLET ORAL at 09:07

## 2024-11-25 RX ADMIN — GABAPENTIN 400 MG: 400 CAPSULE ORAL at 16:09

## 2024-11-25 RX ADMIN — Medication 1 PATCH: at 09:08

## 2024-11-25 RX ADMIN — DIVALPROEX SODIUM 750 MG: 500 TABLET, EXTENDED RELEASE ORAL at 21:20

## 2024-11-25 RX ADMIN — FOLIC ACID 1 MG: 1 TABLET ORAL at 09:07

## 2024-11-25 NOTE — NURSING NOTE
"Patient approached nursing asking for \"something\" \"my head isn't right.\" Patient was offered and accepted Atarax 100mgs po prn   "

## 2024-11-25 NOTE — ASSESSMENT & PLAN NOTE
Differentials at this time: major depressive disorder versus substance-induced mood disorder versus bipolar disorder versus intermittent explosive disorder - patient screens positive for depression, however, it is unclear if the mood fluctuations he describes are consistent with gianna/hypomania as there was often co-occurring substance use and symptomatology described vaguely. There does appear to be difficulty controlling anger with outbursts that are often out of proportion to the situation at hand, consistent with IED    Raudel remains calm and cooperative, visible in the milieu, social with staff and peers, sharing life experiences and giving advice to peers. Compliant with medications and meals. He is able to make his needs known and requests PRN Atarax for anxiety and PRN trazodone for insomnia.     Medication regimen as follows:  Continue Depakote  mg at bed time for mood stabilization and irritability  Depakote level, CBC with diff, and CMP ordered for 11/24/2024 11/24/24 Depakote level 51.   Continue Zoloft 50 mg daily for depression and anxiety   Increase Gabapentin 300 mg three times daily to 400 mg three times daily to better address anxiety and agitation symptomatology  Continue Melatonin 3 mg at bedtime for insomnia    No associated orders from this encounter found during lookback period of 72 hours.

## 2024-11-25 NOTE — NURSING NOTE
Patient visible in patient lounge, watching TV. No signs of agitation or distress. PRN Zyprexa 10 mg PO for sever agitation, effective.

## 2024-11-25 NOTE — NURSING NOTE
Patient reported severe agitation. Broset score 3. PRN Zyprexa 10 mg PO for severe agitation given.
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
## 2024-11-25 NOTE — PLAN OF CARE
Problem: SELF HARM/SUICIDALITY  Goal: Will have no self-injury during hospital stay  Description: INTERVENTIONS:  - Q 15 MINUTES: Routine safety checks  - Q WAKING SHIFT & PRN: Assess risk to determine if routine checks are adequate to maintain patient safety  - Encourage patient to participate actively in care by formulating a plan to combat response to suicidal ideation, identify supports and resources  11/25/2024 0422 by Vivienne Harden RN  Outcome: Progressing     Problem: DEPRESSION  Goal: Will be euthymic at discharge  Description: INTERVENTIONS:  - Administer medication as ordered  - Provide emotional support via 1:1 interaction with staff  - Encourage involvement in milieu/groups/activities  - Monitor for social isolation  11/25/2024 0422 by Vivienne Hadren RN  Outcome: Progressing    Problem: SUBSTANCE USE/ABUSE  Goal: Will have no detox symptoms and will verbalize plan for changing substance-related behavior  Description: INTERVENTIONS:  - Monitor physical status and assess for symptoms of withdrawal  - Administer medication as ordered  - Provide emotional support with 1 on 1 interaction with staff  - Encourage recovery focused program/ addiction education  - Assess for verbalization of changing behaviors related to substance abuse  - Initiate consults and referrals as appropriate (Case Management, Spiritual Care, etc.)  11/25/2024 0422 by Vivienne Harden, RN  Outcome: Progressing  Goal: By discharge, will develop insight into their chemical dependency and sustain motivation to continue in recovery  Description: INTERVENTIONS:  - Attends all daily group sessions and scheduled AA groups  - Actively practices coping skills through participation in the therapeutic community and adherence to program rules  - Reviews and completes assignments from individual treatment plan  - Assist patient development of understanding of their personal cycle of addiction and relapse triggers  11/25/2024 0422 by Vivienne Harden  RN  Outcome: Progressing  Goal: By discharge, patient will have ongoing treatment plan addressing chemical dependency  Description: INTERVENTIONS:  - Assist patient with resources and/or appointments for ongoing recovery based living  11/25/2024 0422 by Vivienne Harden RN  Outcome: Progressing     Problem: Ineffective Coping  Goal: Cooperates with admission process  Description: Interventions:   - Complete admission process  11/25/2024 0422 by Vivienne Harden RN  Outcome: Progressing  11/25/2024 0422 by Vivienne Harden RN  Outcome: Not Progressing  Goal: Identifies ineffective coping skills  11/25/2024 0422 by Vivienne Harden RN  Outcome: Progressing  Goal: Identifies healthy coping skills  11/25/2024 0422 by Vivienne Harden RN  Outcome: Progressing  11/25/2024 0422 by Vivienne Harden RN  Outcome: Not Progressing  Goal: Demonstrates healthy coping skills  11/25/2024 0422 by Vivienne Harden RN  Outcome: Progressing  Goal: Participates in unit activities  Description: Interventions:  - Provide therapeutic environment   - Provide required programming   - Redirect inappropriate behaviors   11/25/2024 0422 by Vivienne Harden RN  Outcome: Progressing
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
## 2024-11-25 NOTE — NURSING NOTE
Patient irritable and agitated upon approach, but remained in behavioral control. Patient redirectable. Patient visible walking unit. Patient denies SI/HI/AH/VH. Patient compliant with meds and meals.

## 2024-11-25 NOTE — NURSING NOTE
Patient is calm and cooperative. Denies SI/HI/AVH and pain. Medication and meal compliant. Offers no complaints at this time.

## 2024-11-26 PROCEDURE — 99232 SBSQ HOSP IP/OBS MODERATE 35: CPT | Performed by: PSYCHIATRY & NEUROLOGY

## 2024-11-26 RX ORDER — SERTRALINE HYDROCHLORIDE 100 MG/1
100 TABLET, FILM COATED ORAL DAILY
Status: DISCONTINUED | OUTPATIENT
Start: 2024-11-27 | End: 2024-12-04 | Stop reason: HOSPADM

## 2024-11-26 RX ADMIN — FOLIC ACID 1 MG: 1 TABLET ORAL at 08:15

## 2024-11-26 RX ADMIN — GABAPENTIN 400 MG: 400 CAPSULE ORAL at 21:02

## 2024-11-26 RX ADMIN — GABAPENTIN 400 MG: 400 CAPSULE ORAL at 08:15

## 2024-11-26 RX ADMIN — DIVALPROEX SODIUM 750 MG: 500 TABLET, EXTENDED RELEASE ORAL at 21:02

## 2024-11-26 RX ADMIN — Medication 1 PATCH: at 08:15

## 2024-11-26 RX ADMIN — SERTRALINE HYDROCHLORIDE 50 MG: 50 TABLET ORAL at 08:15

## 2024-11-26 RX ADMIN — NICOTINE POLACRILEX 2 MG: 2 GUM, CHEWING BUCCAL at 08:15

## 2024-11-26 RX ADMIN — GABAPENTIN 400 MG: 400 CAPSULE ORAL at 16:16

## 2024-11-26 RX ADMIN — NICOTINE POLACRILEX 2 MG: 2 GUM, CHEWING BUCCAL at 12:43

## 2024-11-26 RX ADMIN — HYDROXYZINE HYDROCHLORIDE 100 MG: 50 TABLET, FILM COATED ORAL at 11:42

## 2024-11-26 RX ADMIN — NICOTINE POLACRILEX 2 MG: 2 GUM, CHEWING BUCCAL at 19:31

## 2024-11-26 RX ADMIN — MELATONIN TAB 3 MG 3 MG: 3 TAB at 21:02

## 2024-11-26 RX ADMIN — CYANOCOBALAMIN TAB 1000 MCG 1000 MCG: 1000 TAB at 08:15

## 2024-11-26 NOTE — PLAN OF CARE
Problem: DISCHARGE PLANNING  Goal: Discharge to home or other facility with appropriate resources  Description: INTERVENTIONS:  - Identify barriers to discharge w/patient and caregiver  - Arrange for needed discharge resources and transportation as appropriate  - Identify discharge learning needs (meds, wound care, etc.)  - Arrange for interpretive services to assist at discharge as needed  - Refer to Case Management Department for coordinating discharge planning if the patient needs post-hospital services based on physician/advanced practitioner order or complex needs related to functional status, cognitive ability, or social support system  Outcome: Progressing     Problem: SELF HARM/SUICIDALITY  Goal: Will have no self-injury during hospital stay  Description: INTERVENTIONS:  - Q 15 MINUTES: Routine safety checks  - Q WAKING SHIFT & PRN: Assess risk to determine if routine checks are adequate to maintain patient safety  - Encourage patient to participate actively in care by formulating a plan to combat response to suicidal ideation, identify supports and resources  Outcome: Progressing     Problem: DEPRESSION  Goal: Will be euthymic at discharge  Description: INTERVENTIONS:  - Administer medication as ordered  - Provide emotional support via 1:1 interaction with staff  - Encourage involvement in milieu/groups/activities  - Monitor for social isolation  Outcome: Progressing     Problem: SUBSTANCE USE/ABUSE  Goal: Will have no detox symptoms and will verbalize plan for changing substance-related behavior  Description: INTERVENTIONS:  - Monitor physical status and assess for symptoms of withdrawal  - Administer medication as ordered  - Provide emotional support with 1 on 1 interaction with staff  - Encourage recovery focused program/ addiction education  - Assess for verbalization of changing behaviors related to substance abuse  - Initiate consults and referrals as appropriate (Case Management, Spiritual Care,  etc.)  Outcome: Progressing  Goal: By discharge, will develop insight into their chemical dependency and sustain motivation to continue in recovery  Description: INTERVENTIONS:  - Attends all daily group sessions and scheduled AA groups  - Actively practices coping skills through participation in the therapeutic community and adherence to program rules  - Reviews and completes assignments from individual treatment plan  - Assist patient development of understanding of their personal cycle of addiction and relapse triggers  Outcome: Progressing  Goal: By discharge, patient will have ongoing treatment plan addressing chemical dependency  Description: INTERVENTIONS:  - Assist patient with resources and/or appointments for ongoing recovery based living  Outcome: Progressing     Problem: Ineffective Coping  Goal: Cooperates with admission process  Description: Interventions:   - Complete admission process  Outcome: Progressing  Goal: Identifies ineffective coping skills  Outcome: Progressing  Goal: Identifies healthy coping skills  Outcome: Progressing  Goal: Demonstrates healthy coping skills  Outcome: Progressing  Goal: Participates in unit activities  Description: Interventions:  - Provide therapeutic environment   - Provide required programming   - Redirect inappropriate behaviors   Outcome: Progressing

## 2024-11-26 NOTE — PROGRESS NOTES
Progress Note - Behavioral Health   Name: Raudel Bland 34 y.o. male I MRN: [id number]  Unit/Bed#: -01 I Date of Admission: 11/20/2024   Date of Service: 11/26/2024 I Hospital Day: 5     Assessment & Plan  Mood disorder r/o MDD vs substance-induced mood disorder vs bipolar disorder vs intermittent explosive disorder  Differentials at this time: major depressive disorder versus substance-induced mood disorder versus bipolar disorder versus intermittent explosive disorder - patient screens positive for depression, however, it is unclear if the mood fluctuations he describes are consistent with gianna/hypomania as there was often co-occurring substance use and symptomatology described vaguely. There does appear to be difficulty controlling anger with outbursts that are often out of proportion to the situation at hand, consistent with IED    Raudel remains calm and cooperative, visible in the milieu, social with staff and peers, sharing life experiences and giving advice to peers. Compliant with medications and meals. He is able to make his needs known and requests PRN Zyprexa for agitation.     Medication regimen as follows:  Continue Depakote  mg at bed time for mood stabilization and irritability  Depakote level, CBC with diff, and CMP ordered for 11/24/2024 11/24/24 Depakote level 51.   Increase Zoloft 50 mg daily to 100 mg daily to better address depression and anxiety symptomatology tomorrow morning.   Continue Gabapentin 400 mg three times daily for anxiety and agitation.  Continue Melatonin 3 mg at bedtime for insomnia    No associated orders from this encounter found during lookback period of 72 hours.  Generalized anxiety disorder  See principal problem    No associated orders from this encounter found during lookback period of 72 hours.  Methamphetamine use (HCC)  Encourage cessation   Supportive care on the unit     No associated orders from this encounter found during lookback period of 72  hours.  Cannabis use disorder  Encourage cessation   Supportive care on the unit     No associated orders from this encounter found during lookback period of 72 hours.  Tobacco abuse  Encourage cessation   Nicotine patch 14 mg patch daily   Nicotine gum prn    No associated orders from this encounter found during lookback period of 72 hours.  Medical clearance for psychiatric admission    No associated orders from this encounter found during lookback period of 72 hours.    Housing situation unstable    No associated orders from this encounter found during lookback period of 72 hours.  Noncompliance    No associated orders from this encounter found during lookback period of 72 hours.        ------------------------------------------------------------    Recommended Treatment Plan:   Behavioral checks every 15 minutes  Encourage participation in group therapy, milieu therapy and occupational therapy.  Assess for side effects of medications.  Collaboration with [facility] for medical co-morbidities as indicated.  Continue discussion with CM/SW to assist with obtaining collateral, disposition planning, and the implementation of patient-centered individualized plan of care.  Estimated Discharge Date: 12/2/2024    Risks, benefits and possible side effects of Medications: Risks, benefits, and possible side effects of medications have previously been explained. Increase Zoloft 50 mg daily to 100 mg daily to better address depression and anxiety symptomology starting tomorrow morning.     Legal status: 201    Disposition: to be determined. He would like to go to rehab.       Subjective: Patient's chart was reviewed. Patient's progress and plan was discussed with treatment team. Per nursing report, Raudel has been calm and cooperative on the unit. He remains compliant with medications.     Prn medications over the past 24 hours: Nicorette 11/25/24 at 8:39 and 14:35 and 11/26/24 at 8:15. Zyprexa 10 mg tablet 11/25/24 at 15:32 for  "agitation.     Raudel was evaluated this morning for continuity of care. On examination, Raudel is laying in bed and wrapped in his blanket. He states his mood is \"good.\" He reports sleeping \"good\". His appetite has been \"good\". He denies adverse effects from medications.  He denies active or passive suicidal ideation and homicidal ideation. He denies auditory or visual hallucinations. He reports that his anxiety is \"okay\" and \"better\" from his increased Gabapentin dosage. He reports he would like an increase in his Zoloft dosage because he feels he would \"slightly\" benefit from an increased dosage. He reports the reason he asked for his PRN Zyprexa yesterday was because he had a stressful phone-call with his \"lady\". He reports that he is \"not calling her anymore\" and states that he feels \"relieved\" with his decision. His goal is to \"get through today\".     VS: Reviewed, within normal limits    Progress Toward Goals: medication and meal compliant, calm, cooperative    Psychiatric Review of Systems:  Behavior over the last 24 hours: unchanged  Sleep: improving  Appetite: adequate  Medication side effects: none verbalized  Medical ROS: Complete review of systems is negative except as noted above.    Vital signs in last 24 hours:  Temp:  [96.5 °F (35.8 °C)-97.4 °F (36.3 °C)] 96.5 °F (35.8 °C)  HR:  [68-72] 68  BP: (106-117)/(60-62) 106/60  Resp:  [16] 16  SpO2:  [98 %] 98 %  O2 Device: None (Room air)    Mental Status Exam:    Appearance:  overtly appearing  male, alert, marginal grooming and hygiene, full and styled hair , tattooed, dressed in t-shirt and sweatpants, wrapped in blanket, intermittent eye contact, appears younger than stated age, and thin    Behavior:  calm, cooperative, and laying in bed   Speech:  spontaneous, clear, normal rate, normal volume, and coherent   Mood:  \"good\"   Affect:  constricted   Thought Process:  perseverative, circumstantial   Associations: circumstantial associations, " perseverative   Thought Content:  no verbalized delusions or overt paranoia, ruminating thoughts, negative thoughts   Perceptual Disturbances: no reported hallucinations, does not appear to be responding to internal stimuli at this time, and distracted   Risk Potential: Suicidal ideation -  Denies at present   Homicidal ideation - Denies at present  Potential for aggression - Not at present   Sensorium:  oriented to person, place, and time/date   Memory:  recent and remote memory grossly intact   Consciousness:  alert and awake   Attention/Concentration: attention span and concentration appear shorter than expected for age   Insight:  partial   Judgment: poor   Gait/Station: normal gait/station   Motor Activity: no abnormal movements     Current Medications:  Current Facility-Administered Medications   Medication Dose Route Frequency Provider Last Rate    acetaminophen  650 mg Oral Q6H PRN Amy Orta, DO      aluminum-magnesium hydroxide-simethicone  30 mL Oral Q4H PRN Amy Orta, DO      benztropine  1 mg Intramuscular Q4H PRN Max 6/day Amy Orta, DO      benztropine  1 mg Oral Q4H PRN Max 6/day Amy Orta, DO      cyanocobalamin  1,000 mcg Oral Daily Ashley Jacobs, CRNP      cyanocobalamin  1,000 mcg Intramuscular Once Ashley Jacobs, KENISHANP      hydrOXYzine HCL  50 mg Oral Q6H PRN Max 4/day Amy Orta, DO      Or    diphenhydrAMINE  50 mg Intramuscular Q6H PRN Amy Orta, DO      divalproex sodium  750 mg Oral HS Amy Orta, DO      ergocalciferol  50,000 Units Oral Weekly JAM Copeland      folic acid  1 mg Oral Daily KENISHA CopelandNP      gabapentin  400 mg Oral TID Amy Orta, DO      hydrOXYzine HCL  100 mg Oral Q6H PRN Max 4/day Amy Orta, DO      Or    LORazepam  2 mg Intramuscular Q6H PRN Amy Orta, DO      hydrOXYzine HCL  25 mg Oral Q6H PRN Max 4/day Amy Orta, DO      ibuprofen  400 mg Oral Q6H PRN Amy Orta, DO       ibuprofen  600 mg Oral Q8H PRN Amy Orta, DO      melatonin  3 mg Oral HS Amy Orta, DO      nicotine  1 patch Transdermal Daily Amy Orta, DO      nicotine polacrilex  2 mg Oral Q2H PRN Amy Orta, DO      OLANZapine  10 mg Oral Q3H PRN Max 3/day Amy Orta, DO      Or    OLANZapine  10 mg Intramuscular Q3H PRN Max 3/day Amy Orta, DO      OLANZapine  5 mg Oral Q3H PRN Max 6/day Amy Orta, DO      Or    OLANZapine  5 mg Intramuscular Q3H PRN Max 6/day Amy You, DO      OLANZapine  2.5 mg Oral Q3H PRN Max 8/day Amy Orta, DO      polyethylene glycol  17 g Oral Daily PRN Amy Orta, DO      propranolol  10 mg Oral Q8H PRN Amy Orta, DO      senna-docusate sodium  1 tablet Oral Daily PRN Amy Orta, DO      sertraline  50 mg Oral Daily Amy Orta, DO      traZODone  50 mg Oral HS PRN Amy Orta, DO         Behavioral Health Medications: all current active meds have been reviewed. Changes as in plan section above.    Laboratory results:  I have personally reviewed all pertinent laboratory/tests results.   Recent Results (from the past 48 hours)   CBC and differential    Collection Time: 11/24/24  8:19 PM   Result Value Ref Range    WBC 9.03 4.31 - 10.16 Thousand/uL    RBC 4.08 3.88 - 5.62 Million/uL    Hemoglobin 13.3 12.0 - 17.0 g/dL    Hematocrit 39.1 36.5 - 49.3 %    MCV 96 82 - 98 fL    MCH 32.6 26.8 - 34.3 pg    MCHC 34.0 31.4 - 37.4 g/dL    RDW 12.5 11.6 - 15.1 %    MPV 10.7 8.9 - 12.7 fL    Platelets 203 149 - 390 Thousands/uL    nRBC 0 /100 WBCs    Segmented % 58 43 - 75 %    Immature Grans % 0 0 - 2 %    Lymphocytes % 30 14 - 44 %    Monocytes % 8 4 - 12 %    Eosinophils Relative 3 0 - 6 %    Basophils Relative 1 0 - 1 %    Absolute Neutrophils 5.23 1.85 - 7.62 Thousands/µL    Absolute Immature Grans 0.03 0.00 - 0.20 Thousand/uL    Absolute Lymphocytes 2.70 0.60 - 4.47 Thousands/µL    Absolute Monocytes 0.76 0.17 - 1.22 Thousand/µL    Eosinophils  Absolute 0.26 0.00 - 0.61 Thousand/µL    Basophils Absolute 0.05 0.00 - 0.10 Thousands/µL   Comprehensive metabolic panel    Collection Time: 11/24/24  8:19 PM   Result Value Ref Range    Sodium 142 135 - 147 mmol/L    Potassium 3.9 3.5 - 5.3 mmol/L    Chloride 105 96 - 108 mmol/L    CO2 28 21 - 32 mmol/L    ANION GAP 9 4 - 13 mmol/L    BUN 21 5 - 25 mg/dL    Creatinine 1.00 0.60 - 1.30 mg/dL    Glucose 114 65 - 140 mg/dL    Calcium 8.9 8.4 - 10.2 mg/dL    AST 11 (L) 13 - 39 U/L    ALT 13 7 - 52 U/L    Alkaline Phosphatase 75 34 - 104 U/L    Total Protein 6.7 6.4 - 8.4 g/dL    Albumin 4.3 3.5 - 5.0 g/dL    Total Bilirubin 0.21 0.20 - 1.00 mg/dL    eGFR 98 ml/min/1.73sq m   Valproic acid level, total    Collection Time: 11/24/24  8:19 PM   Result Value Ref Range    Valproic Acid, Total 51 50 - 100 ug/mL        Counseling / Coordination of Care:  Patient's progress discussed with staff in treatment team meeting.  Medication changes reviewed with staff in treatment team meeting.  Medication changes discussed with patient.  Supportive therapy provided to patient.  Reassurance and supportive therapy provided.      Charles Shetty 11/26/24  MS4  This note has been constructed using a voice recognition system. There may be translation, syntax, or grammatical errors. If you have any questions, please contact the dictating author.

## 2024-11-26 NOTE — NURSING NOTE
Patient is calm and cooperative upon approach. Patient is visible for meals on unit, but mostly isolative to self and room. Patient denies SI/HI/AH/VH. Patient is compliant with meds and meals.

## 2024-11-26 NOTE — PROGRESS NOTES
11/26/24 0913   Team Meeting   Meeting Type Daily Rounds   Team Members Present   Team Members Present Physician;Nurse;   Physician Team Member Nir   [facility]   Patient/Family Present   Patient Present No   Patient's Family Present No     Pt pleasant, cooperative. Pt reported some increased agitation in the afternoon and received PRN Zyprexa. Pt's Gabapentin was increased yesterday.

## 2024-11-26 NOTE — ASSESSMENT & PLAN NOTE
Differentials at this time: major depressive disorder versus substance-induced mood disorder versus bipolar disorder versus intermittent explosive disorder - patient screens positive for depression, however, it is unclear if the mood fluctuations he describes are consistent with gianna/hypomania as there was often co-occurring substance use and symptomatology described vaguely. There does appear to be difficulty controlling anger with outbursts that are often out of proportion to the situation at hand, consistent with IED    Raudel remains calm and cooperative, visible in the milieu, social with staff and peers, sharing life experiences and giving advice to peers. Compliant with medications and meals. He is able to make his needs known and requests PRN Zyprexa for agitation.     Medication regimen as follows:  Continue Depakote  mg at bed time for mood stabilization and irritability  Depakote level, CBC with diff, and CMP ordered for 11/24/2024 11/24/24 Depakote level 51.   Increase Zoloft 50 mg daily to 100 mg daily to better address depression and anxiety symptomatology tomorrow morning.   Continue Gabapentin 400 mg three times daily for anxiety and agitation.  Continue Melatonin 3 mg at bedtime for insomnia    No associated orders from this encounter found during lookback period of 72 hours.

## 2024-11-26 NOTE — SOCIAL WORK
Cm checked in with Pt. Pt reported he ended his relationship with girlfriend.   Pt reported he needs to focus on himself.   Pt reported that he is looking forward to going to rehab. Pt discussed his plans post discharge.

## 2024-11-26 NOTE — NURSING NOTE
Patient visible on unit, eating lunch and socializing with peers. No signs of distress or anxiety. PRN Atarax 100 mg PO for severe anxiety, effective at this time.

## 2024-11-27 PROCEDURE — 99232 SBSQ HOSP IP/OBS MODERATE 35: CPT | Performed by: PSYCHIATRY & NEUROLOGY

## 2024-11-27 RX ADMIN — Medication 1 PATCH: at 08:29

## 2024-11-27 RX ADMIN — HYDROXYZINE HYDROCHLORIDE 100 MG: 50 TABLET, FILM COATED ORAL at 04:42

## 2024-11-27 RX ADMIN — NICOTINE POLACRILEX 2 MG: 2 GUM, CHEWING BUCCAL at 20:31

## 2024-11-27 RX ADMIN — SERTRALINE HYDROCHLORIDE 100 MG: 100 TABLET ORAL at 08:29

## 2024-11-27 RX ADMIN — MELATONIN TAB 3 MG 3 MG: 3 TAB at 21:09

## 2024-11-27 RX ADMIN — GABAPENTIN 400 MG: 400 CAPSULE ORAL at 08:29

## 2024-11-27 RX ADMIN — NICOTINE POLACRILEX 2 MG: 2 GUM, CHEWING BUCCAL at 09:20

## 2024-11-27 RX ADMIN — DIVALPROEX SODIUM 750 MG: 500 TABLET, EXTENDED RELEASE ORAL at 21:09

## 2024-11-27 RX ADMIN — NICOTINE POLACRILEX 2 MG: 2 GUM, CHEWING BUCCAL at 17:20

## 2024-11-27 RX ADMIN — CYANOCOBALAMIN TAB 1000 MCG 1000 MCG: 1000 TAB at 08:29

## 2024-11-27 RX ADMIN — FOLIC ACID 1 MG: 1 TABLET ORAL at 08:29

## 2024-11-27 RX ADMIN — GABAPENTIN 400 MG: 400 CAPSULE ORAL at 16:18

## 2024-11-27 RX ADMIN — TRAZODONE HYDROCHLORIDE 50 MG: 50 TABLET ORAL at 21:16

## 2024-11-27 RX ADMIN — HYDROXYZINE HYDROCHLORIDE 25 MG: 25 TABLET, FILM COATED ORAL at 18:36

## 2024-11-27 RX ADMIN — GABAPENTIN 400 MG: 400 CAPSULE ORAL at 21:09

## 2024-11-27 NOTE — NURSING NOTE
Patient visible on unit. Patient isolative to self. Patient denies SI/HI/AH/VH. Patient reports anxiety. Patient is compliant with meds and meals. Patient remains in behavioral control.

## 2024-11-27 NOTE — PROGRESS NOTES
Progress Note - Behavioral Health   Name: Raudel Bland 34 y.o. male I MRN: [id number]  Unit/Bed#: -01 I Date of Admission: 11/20/2024   Date of Service: 11/27/2024 I Hospital Day: 6     Assessment & Plan  Mood disorder r/o MDD vs substance-induced mood disorder vs bipolar disorder vs intermittent explosive disorder  Differentials at this time: major depressive disorder versus substance-induced mood disorder versus bipolar disorder versus intermittent explosive disorder - patient screens positive for depression, however, it is unclear if the mood fluctuations he describes are consistent with gianna/hypomania as there was often co-occurring substance use and symptomatology described vaguely. There does appear to be difficulty controlling anger with outbursts that are often out of proportion to the situation at hand, consistent with IED    Raudel remains calm and cooperative, visible in the milieu, but mostly isolative to his room. Compliant with medications and meals. He is able to make his needs known and requests PRN Atarax for anxiety and nightmare.     Medication regimen as follows, no changes at this time:  Continue Depakote  mg at bed time for mood stabilization and irritability  Depakote level, CBC with diff, and CMP ordered for 11/24/2024 11/24/24 Depakote level 51.   Continue Zoloft 100 mg daily for depression and anxiety.  Continue Gabapentin 400 mg three times daily for anxiety and agitation.  Continue Melatonin 3 mg at bedtime for insomnia  No new medication changes as of 11/27/24    No associated orders from this encounter found during lookback period of 72 hours.  Generalized anxiety disorder  See principal problem    No associated orders from this encounter found during lookback period of 72 hours.  Methamphetamine use (HCC)  Encourage cessation   Supportive care on the unit     No associated orders from this encounter found during lookback period of 72 hours.  Cannabis use  disorder  Encourage cessation   Supportive care on the unit     No associated orders from this encounter found during lookback period of 72 hours.  Tobacco abuse  Encourage cessation   Nicotine patch 14 mg patch daily   Nicotine gum prn    No associated orders from this encounter found during lookback period of 72 hours.  Medical clearance for psychiatric admission    No associated orders from this encounter found during lookback period of 72 hours.    Housing situation unstable    No associated orders from this encounter found during lookback period of 72 hours.  Noncompliance    No associated orders from this encounter found during lookback period of 72 hours.    ------------------------------------------------------------    Recommended Treatment Plan:   Behavioral checks every 15 minutes  Encourage participation in group therapy, milieu therapy and occupational therapy.  Assess for side effects of medications.  Collaboration with [facility] for medical co-morbidities as indicated.  Continue discussion with CM/SW to assist with obtaining collateral, disposition planning, and the implementation of patient-centered individualized plan of care.  Estimated Discharge Date: 12/2/2024    Risks, benefits and possible side effects of Medications: Risks, benefits, and possible side effects of medications have previously been explained. No new medications at this time.    Legal status: 201    Disposition: to be determined. He would like to go to rehab as long as he is able to work and make money.       Subjective: Patient's chart was reviewed. Patient's progress and plan was discussed with treatment team. Per nursing report, Raudel has been calm and cooperative on the unit. He remains compliant with medications.     Prn medications over the past 24 hours: Atarax 100 mg 11/26/24 at 11:42 and 11/27/24 at 4:42 for anxiety and nightmare. Nicorette 11/26/24 12:43 and 19:31 and 11/27/24 9:20.     Raudel was evaluated this morning for  "continuity of care. On examination, Raudel is standing and wearing a t-shirt and scrub pants. He states his mood is \"hyper\" because he drank a cup of coffee for the first time in a while. He reports sleeping poorly as he woke up around 4 AM due to a nightmare. He presumes his nightmare was triggered because he was \"thinking about [his] ex\". He requested PRN Atarax and was able to fall back asleep. His appetite has been \"great\". He denies adverse effects from medications. He denies active or passive suicidal ideation and homicidal ideation. He denies auditory or visual hallucinations. He reports that he would like to go to rehab only if he will be able to work and make money. He reports he would like to get a job and get a place for himself. He wants to save all his money and he feels like he is \"finally coming together\" and is \"forming plans in [his] head\". He hopes to get a dog in the future. He denies feelings of anxiety at the moment but endorses feelings of depression \"here and there\" only when he calls his \"lady\". However, he has not called his lady in 2 days. He reports that he and his lady are \"done\" as she prevents him from bettering himself and \"holds [him] back\".     VS: Reviewed, within normal limits    Progress Toward Goals: medication and meal compliant, calm, cooperative    Psychiatric Review of Systems:  Behavior over the last 24 hours: unchanged  Sleep: nightmares  Appetite: adequate  Medication side effects: none verbalized  Medical ROS: Complete review of systems is negative except as noted above.    Vital signs in last 24 hours:  Temp:  [96.9 °F (36.1 °C)-97.4 °F (36.3 °C)] 96.9 °F (36.1 °C)  HR:  [70-87] 70  BP: (103-118)/(55-56) 103/55  SpO2:  [96 %-98 %] 98 %  O2 Device: None (Room air)    Mental Status Exam:    Appearance:  overtly appearing  male, alert, marginal grooming and hygiene, full and styled hair , tattooed, dressed in t-shirt and scrub pants, intermittent eye contact, " "appears younger than stated age, and thin    Behavior:  calm and cooperative   Speech:  spontaneous, clear, normal rate, normal volume, and coherent   Mood:  \"hyper\"   Affect:  slightly brighter   Thought Process:  perseverative, circumstantial   Associations: circumstantial associations, perseveration   Thought Content:  no verbalized delusions or overt paranoia, ruminating thoughts   Perceptual Disturbances: no reported hallucinations, does not appear to be responding to internal stimuli at this time, and distracted   Risk Potential: Suicidal ideation -  Denies at present   Homicidal ideation - Denies at present  Potential for aggression - Not at present   Sensorium:  oriented to person, place, and time/date   Memory:  recent and remote memory grossly intact   Consciousness:  alert and awake   Attention/Concentration: attention span and concentration appear shorter than expected for age   Insight:  partial   Judgment: poor, but improving   Gait/Station: normal gait/station   Motor Activity: no abnormal movements     Current Medications:  Current Facility-Administered Medications   Medication Dose Route Frequency Provider Last Rate    acetaminophen  650 mg Oral Q6H PRN Amy Orta, DO      aluminum-magnesium hydroxide-simethicone  30 mL Oral Q4H PRN Amy Orta DO      benztropine  1 mg Intramuscular Q4H PRN Max 6/day Amy Orta, DO      benztropine  1 mg Oral Q4H PRN Max 6/day Amy Orta, DO      cyanocobalamin  1,000 mcg Oral Daily JAM Copeland      cyanocobalamin  1,000 mcg Intramuscular Once JAM Copeland      hydrOXYzine HCL  50 mg Oral Q6H PRN Max 4/day Amy Orta DO      Or    diphenhydrAMINE  50 mg Intramuscular Q6H PRN Amy Orta DO      divalproex sodium  750 mg Oral HS Amy Orta DO      ergocalciferol  50,000 Units Oral Weekly JAM Copeland      folic acid  1 mg Oral Daily JAM Copeland      gabapentin  400 mg Oral TID " Amy You, DO      hydrOXYzine HCL  100 mg Oral Q6H PRN Max 4/day Amy You, DO      Or    LORazepam  2 mg Intramuscular Q6H PRN Amy You, DO      hydrOXYzine HCL  25 mg Oral Q6H PRN Max 4/day Amy You, DO      ibuprofen  400 mg Oral Q6H PRN Amy You, DO      ibuprofen  600 mg Oral Q8H PRN Amy You, DO      melatonin  3 mg Oral HS Amy You, DO      nicotine  1 patch Transdermal Daily Amy You, DO      nicotine polacrilex  2 mg Oral Q2H PRN Amy You, DO      OLANZapine  10 mg Oral Q3H PRN Max 3/day Amy You, DO      Or    OLANZapine  10 mg Intramuscular Q3H PRN Max 3/day Amy You, DO      OLANZapine  5 mg Oral Q3H PRN Max 6/day Amy You, DO      Or    OLANZapine  5 mg Intramuscular Q3H PRN Max 6/day Amy You, DO      OLANZapine  2.5 mg Oral Q3H PRN Max 8/day Amy You, DO      polyethylene glycol  17 g Oral Daily PRN Amy You, DO      propranolol  10 mg Oral Q8H PRN Amy You, DO      senna-docusate sodium  1 tablet Oral Daily PRN Amy You, DO      sertraline  100 mg Oral Daily Amy You, DO      traZODone  50 mg Oral HS PRN Amy You, DO         Behavioral Health Medications: all current active meds have been reviewed. Changes as in plan section above.    Laboratory results:  I have personally reviewed all pertinent laboratory/tests results.   No results found for this or any previous visit (from the past 48 hours).     Counseling / Coordination of Care:  Patient's progress discussed with staff in treatment team meeting.  Medication changes reviewed with staff in treatment team meeting.  Medication changes discussed with patient.  Supportive therapy provided to patient.  Reassurance and supportive therapy provided.      Charles Shetty 11/27/24  MS4  This note has been constructed using a voice recognition system. There may be translation, syntax, or grammatical errors. If you have any questions, please contact the  dictating author.

## 2024-11-27 NOTE — PROGRESS NOTES
11/27/24 1015   Activity/Group Checklist   Group Wellness  (open art)   Attendance Attended   Attendance Duration (min) 16-30   Interactions Interacted appropriately   Affect/Mood Appropriate   Goals Achieved Able to listen to others;Able to engage in interactions;Able to self-disclose;Identified triggers;Identified feelings

## 2024-11-27 NOTE — NURSING NOTE
Patient approached nursing station with complaint of experiencing a nightmare and endorsing severe anxiety, HAM score 30. Patient requested/received PRN Atarax 100mg PO at 0442. Will monitor for effect.    0542- PRN Atarax 100mg PO effective.

## 2024-11-27 NOTE — ASSESSMENT & PLAN NOTE
Differentials at this time: major depressive disorder versus substance-induced mood disorder versus bipolar disorder versus intermittent explosive disorder - patient screens positive for depression, however, it is unclear if the mood fluctuations he describes are consistent with gianna/hypomania as there was often co-occurring substance use and symptomatology described vaguely. There does appear to be difficulty controlling anger with outbursts that are often out of proportion to the situation at hand, consistent with IED    Raudel remains calm and cooperative, visible in the milieu, but mostly isolative to his room. Compliant with medications and meals. He is able to make his needs known and requests PRN Atarax for anxiety and nightmare.     Medication regimen as follows, no changes at this time:  Continue Depakote  mg at bed time for mood stabilization and irritability  Depakote level, CBC with diff, and CMP ordered for 11/24/2024 11/24/24 Depakote level 51.   Continue Zoloft 100 mg daily for depression and anxiety.  Continue Gabapentin 400 mg three times daily for anxiety and agitation.  Continue Melatonin 3 mg at bedtime for insomnia  No new medication changes as of 11/27/24    No associated orders from this encounter found during lookback period of 72 hours.

## 2024-11-27 NOTE — PROGRESS NOTES
11/27/24 0925   Team Meeting   Meeting Type Daily Rounds   Team Members Present   Team Members Present Physician;Nurse;   Physician Team Member Nir   Nursing Team Member Hector   Care Management Team Member Levi   Patient/Family Present   Patient Present No   Patient's Family Present No     Pt med/meal compliant. Pt's Zoloft increased to 100mg. Pt focused on relationship termination with his girlfriend.

## 2024-11-27 NOTE — NURSING NOTE
"Pt verbally denies SI, HI and A/V hallucinations. Endorses some anxiety that he states is due to \"a lot of personal issues going on at once.\" Compliant with meds. Pt is observed as social, pleasant but pressured when symptoms of anxiety are elevated. Is otherwise visible in milieu frequently and effective in communicating condition. Content is reality based. Visible in personal attire by choice, is requesting donations for increased clothing options.             "

## 2024-11-28 PROCEDURE — 99232 SBSQ HOSP IP/OBS MODERATE 35: CPT | Performed by: PSYCHIATRY & NEUROLOGY

## 2024-11-28 RX ADMIN — GABAPENTIN 400 MG: 400 CAPSULE ORAL at 17:44

## 2024-11-28 RX ADMIN — GABAPENTIN 400 MG: 400 CAPSULE ORAL at 08:52

## 2024-11-28 RX ADMIN — DIVALPROEX SODIUM 750 MG: 500 TABLET, EXTENDED RELEASE ORAL at 21:14

## 2024-11-28 RX ADMIN — MELATONIN TAB 3 MG 3 MG: 3 TAB at 21:13

## 2024-11-28 RX ADMIN — SERTRALINE HYDROCHLORIDE 100 MG: 100 TABLET ORAL at 08:52

## 2024-11-28 RX ADMIN — Medication 1 PATCH: at 08:52

## 2024-11-28 RX ADMIN — GABAPENTIN 400 MG: 400 CAPSULE ORAL at 21:14

## 2024-11-28 RX ADMIN — FOLIC ACID 1 MG: 1 TABLET ORAL at 08:52

## 2024-11-28 RX ADMIN — NICOTINE POLACRILEX 2 MG: 2 GUM, CHEWING BUCCAL at 17:44

## 2024-11-28 RX ADMIN — TRAZODONE HYDROCHLORIDE 50 MG: 50 TABLET ORAL at 21:16

## 2024-11-28 RX ADMIN — CYANOCOBALAMIN TAB 1000 MCG 1000 MCG: 1000 TAB at 08:52

## 2024-11-28 RX ADMIN — NICOTINE POLACRILEX 2 MG: 2 GUM, CHEWING BUCCAL at 08:53

## 2024-11-28 RX ADMIN — HYDROXYZINE HYDROCHLORIDE 50 MG: 50 TABLET, FILM COATED ORAL at 12:03

## 2024-11-28 NOTE — NURSING NOTE
Pt is cooperative and med compliant. Pt can be seen out on the milieu socializing with peers. Pt denies depression and anxiety. Pt denies SI/HI and AH/VH. Pt asleep in bed with after receiving Trazodone 50mg. Will continue to monitor.

## 2024-11-28 NOTE — NURSING NOTE
"Patient demonstrated good insight into what lead to his hospitalization such as relapsing with stimulants, medication non-compliance and rationing medications due to running low, fears of interaction of his medications with substance use. He reports plans to cut ties to those who use, or are negative influences. 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 He denies AVH/SI/HI. He reports wanting to lead a healthier lifestyle by working out. He demonstrates insight into his past addictive behavior such as stating, \"Using once, is never enough.\" Patient is pleasant and cooperative, medication compliant.  He reports wanting to be a good father to his child, and understands he must focus on himself first for his child, and that his former relationships will take time to mend.   "

## 2024-11-28 NOTE — NURSING NOTE
Atarax 25mg PO Prn was effective in reducing anxiety symptoms on reassessment. Pt is no longer presenting with this concern at this time.

## 2024-11-28 NOTE — PROGRESS NOTES
Psychiatric Progress Note - Department of Behavioral Health   Raudel Bland 34 y.o. male MRN: [id number]  Unit/Bed#: U 352-01 Encounter: [id number]    ASSESSMENT & PLAN     Assessment & Plan  Mood disorder r/o MDD vs substance-induced mood disorder vs bipolar disorder vs intermittent explosive disorder  Differentials at this time: major depressive disorder versus substance-induced mood disorder versus bipolar disorder versus intermittent explosive disorder - patient screens positive for depression, however, it is unclear if the mood fluctuations he describes are consistent with gianna/hypomania as there was often co-occurring substance use and symptomatology described vaguely. There does appear to be difficulty controlling anger with outbursts that are often out of proportion to the situation at hand, consistent with IED    Raudel remains calm and cooperative, visible in the milieu, but mostly isolative to his room. Compliant with medications and meals. He is able to make his needs known and requests PRN Atarax for anxiety and nightmare.     Medication regimen as follows, no changes at this time:  Continue Depakote  mg at bed time for mood stabilization and irritability  Depakote level, CBC with diff, and CMP ordered for 11/24/2024 11/24/24 Depakote level 51.   Continue Zoloft 100 mg daily for depression and anxiety.  Continue Gabapentin 400 mg three times daily for anxiety and agitation.  Continue Melatonin 3 mg at bedtime for insomnia  No new medication changes as of 11/27/24    No associated orders from this encounter found during lookback period of 72 hours.  Generalized anxiety disorder  See principal problem    No associated orders from this encounter found during lookback period of 72 hours.  Methamphetamine use (HCC)  Encourage cessation   Supportive care on the unit     No associated orders from this encounter found during lookback period of 72 hours.  Cannabis use disorder  Encourage  "cessation   Supportive care on the unit     No associated orders from this encounter found during lookback period of 72 hours.  Tobacco abuse  Encourage cessation   Nicotine patch 14 mg patch daily   Nicotine gum prn    No associated orders from this encounter found during lookback period of 72 hours.  Medical clearance for psychiatric admission    No associated orders from this encounter found during lookback period of 72 hours.    Housing situation unstable    No associated orders from this encounter found during lookback period of 72 hours.  Noncompliance    No associated orders from this encounter found during lookback period of 72 hours.      SUBJECTIVE     Patient evaluated this a.m. for continuity of care. Patient was discussed at length with nursing and treatment team. Per nursing, patient has had periods of having a bright mood but also having a down mood.  He has been attending and participating in group.  He has been pleasant.    Patient stated his mood was \"alright.\"  He stated that he has been sleeping well.  He endorsed an adequate appetite.  He is denying medication side effects currently.  He denied suicidal ideation.  He denied homicidal ideation.  He denied auditory and visual hallucinations.  Patient went to clarify to this writer that he is hoping to be discharged early next week.  He was originally hoping to be discharged on December 1 but when he was informed discharge do not usually happen over the weekend he stated that December 2 would be fine.  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    PSYCHIATRIC REVIEW OF SYSTEMS     Behavior over the last 24 hours:  unchanged  Sleep: normal  Appetite: normal  Medication side effects: No    REVIEW OF SYSTEMS   Review of systems: no complaints    OBJECTIVE     Vital Signs in Past 24 Hours:  Temp:  [97.6 °F (36.4 °C)-97.8 °F (36.6 °C)] 97.6 °F (36.4 " "°C)  HR:  [63-92] 92  BP: (112-149)/(57-69) 149/69  Resp:  [16] 16  SpO2:  [98 %-99 %] 99 %  O2 Device: None (Room air)    Intake/Output in Past 24 hours:  No intake/output data recorded.  No intake/output data recorded.        Laboratory Results:  I have personally reviewed all pertinent laboratory/tests results.    Behavioral Health Medications: all current active meds have been reviewed.      Mental Status Evaluation:  Appearance:  Good grooming/hygiene, tattooed, dressed casually, appears stated age, overtly  male   Behavior:  Pleasant and cooperative   Speech:  Normal rate and volume   Mood:  \"alright.\"   Affect:  Normal range and intensity, mood congruent   Language naming objects and repeating phrases   Thought Process:  goal directed and logical   Thought Content:  No overt delusions or paranoia   Perceptual Disturbances: Denied auditory and visual hallucinations.  Does not appear to be responding to internal stimuli.   Risk Potential: Suicidal Ideations none, Homicidal Ideations none, and Potential for Aggression No   Sensorium:  person, place, and time/date   Cognition:  recent and remote memory grossly intact   Consciousness:  alert and awake    Attention: attention span and concentration were age appropriate   Insight:  Improving   Judgment: Improving   Intellect Not assessed   Gait/Station: normal gait/station   Motor Activity: no abnormal movements     Memory: Short and long term memory intact     Progress Toward Goals: Improving, as evidenced by their participation (or lack thereof) in individual, social and therapeutic milieu in addition to adherence to their medication regimen.    Recommended Treatment:   See above for assessment and plan.    Inpatient Psychiatric Certification: Based upon physical, mental and social evaluations, I certify that inpatient psychiatric services are medically necessary for this patient for a duration of greater than 2 midnights for the treatment of Unspecified " mood (affective) disorder (HCC) including psychotropic medication management, participation in the therapeutic milieu and referrals as indicated. Available alternative community resources do not meet the patient's mental health care needs. I further attest that an established written individualized plan of care has been implemented and is outlined in the patient's medical records.    Counseling/Coordination of Care    I have expended greater than 15 minutes in which more than 50% of this time was expended in counseling/coordination of patient care relating to diagnostic results, prognosis, potential risks and benefits of management options, instructions for appropriate management, patient and/or collateral education, importance of adherence to management and/or risk factor reductions. Patient's rights, confidentiality, exceptions to confidentiality, use of electronic medical record including appropriate staff access to medical record regarding behavioral health services and consent to treatment were reviewed.    Note Share:     This note was not shared with the patient due to reasonable likelihood of causing patient harm     This note has been constructed using a voice recognition system. There may be translation, syntax,  or grammatical errors. If you have any questions, please contact the dictating provider.    Taj Adair, DO 11/28/24   PGY-II

## 2024-11-28 NOTE — PLAN OF CARE
Problem: DISCHARGE PLANNING  Goal: Discharge to home or other facility with appropriate resources  Description: INTERVENTIONS:  - Identify barriers to discharge w/patient and caregiver  - Arrange for needed discharge resources and transportation as appropriate  - Identify discharge learning needs (meds, wound care, etc.)  - Arrange for interpretive services to assist at discharge as needed  - Refer to Case Management Department for coordinating discharge planning if the patient needs post-hospital services based on physician/advanced practitioner order or complex needs related to functional status, cognitive ability, or social support system  Outcome: Progressing     Problem: SELF HARM/SUICIDALITY  Goal: Will have no self-injury during hospital stay  Description: INTERVENTIONS:  - Q 15 MINUTES: Routine safety checks  - Q WAKING SHIFT & PRN: Assess risk to determine if routine checks are adequate to maintain patient safety  - Encourage patient to participate actively in care by formulating a plan to combat response to suicidal ideation, identify supports and resources  Outcome: Progressing     Problem: DEPRESSION  Goal: Will be euthymic at discharge  Description: INTERVENTIONS:  - Administer medication as ordered  - Provide emotional support via 1:1 interaction with staff  - Encourage involvement in milieu/groups/activities  - Monitor for social isolation  Outcome: Progressing     Problem: SUBSTANCE USE/ABUSE  Goal: Will have no detox symptoms and will verbalize plan for changing substance-related behavior  Description: INTERVENTIONS:  - Monitor physical status and assess for symptoms of withdrawal  - Administer medication as ordered  - Provide emotional support with 1 on 1 interaction with staff  - Encourage recovery focused program/ addiction education  - Assess for verbalization of changing behaviors related to substance abuse  - Initiate consults and referrals as appropriate (Case Management, Spiritual Care,  etc.)  Outcome: Progressing  Goal: By discharge, will develop insight into their chemical dependency and sustain motivation to continue in recovery  Description: INTERVENTIONS:  - Attends all daily group sessions and scheduled AA groups  - Actively practices coping skills through participation in the therapeutic community and adherence to program rules  - Reviews and completes assignments from individual treatment plan  - Assist patient development of understanding of their personal cycle of addiction and relapse triggers  Outcome: Progressing  Goal: By discharge, patient will have ongoing treatment plan addressing chemical dependency  Description: INTERVENTIONS:  - Assist patient with resources and/or appointments for ongoing recovery based living  Outcome: Progressing

## 2024-11-28 NOTE — NURSING NOTE
Patient complaining of anxiety related to wondering how his daughter is doing on the holidays, and coping with being here at the hospital on the holiday. Patient given 50mg of PRN PO atarax for moderate anxiety.     1303: Patient observed calmly talking on phone with friends. 50mg of PRN PO atarax effective for moderate anxiety.

## 2024-11-29 PROCEDURE — 99232 SBSQ HOSP IP/OBS MODERATE 35: CPT | Performed by: PSYCHIATRY & NEUROLOGY

## 2024-11-29 RX ADMIN — NICOTINE POLACRILEX 2 MG: 2 GUM, CHEWING BUCCAL at 09:27

## 2024-11-29 RX ADMIN — IBUPROFEN 600 MG: 600 TABLET, FILM COATED ORAL at 15:23

## 2024-11-29 RX ADMIN — MELATONIN TAB 3 MG 3 MG: 3 TAB at 21:18

## 2024-11-29 RX ADMIN — FOLIC ACID 1 MG: 1 TABLET ORAL at 09:23

## 2024-11-29 RX ADMIN — GABAPENTIN 400 MG: 400 CAPSULE ORAL at 21:18

## 2024-11-29 RX ADMIN — Medication 1 PATCH: at 09:23

## 2024-11-29 RX ADMIN — NICOTINE POLACRILEX 2 MG: 2 GUM, CHEWING BUCCAL at 19:46

## 2024-11-29 RX ADMIN — SERTRALINE HYDROCHLORIDE 100 MG: 100 TABLET ORAL at 09:23

## 2024-11-29 RX ADMIN — DIVALPROEX SODIUM 750 MG: 500 TABLET, EXTENDED RELEASE ORAL at 21:17

## 2024-11-29 RX ADMIN — TRAZODONE HYDROCHLORIDE 50 MG: 50 TABLET ORAL at 21:19

## 2024-11-29 RX ADMIN — NICOTINE POLACRILEX 2 MG: 2 GUM, CHEWING BUCCAL at 13:30

## 2024-11-29 RX ADMIN — CYANOCOBALAMIN TAB 1000 MCG 1000 MCG: 1000 TAB at 09:23

## 2024-11-29 RX ADMIN — GABAPENTIN 400 MG: 400 CAPSULE ORAL at 09:23

## 2024-11-29 RX ADMIN — NICOTINE POLACRILEX 2 MG: 2 GUM, CHEWING BUCCAL at 16:31

## 2024-11-29 RX ADMIN — GABAPENTIN 400 MG: 400 CAPSULE ORAL at 18:23

## 2024-11-29 NOTE — PROGRESS NOTES
11/29/24 0914   Team Meeting   Meeting Type Daily Rounds   Team Members Present   Team Members Present Physician;Nurse;   Physician Team Member Nir   [facility]ing Team Member [facility] Team Member Levi   Patient/Family Present   Patient Present No   Patient's Family Present No     Pt med/meal compliant. Visible on the unit. PRN Atarax for anxiety. Pleasant, cam, cooperative. Discharge possible next week.

## 2024-11-29 NOTE — CMS CERTIFICATION NOTE
Certification: Based upon physical, mental and social evaluations, I certify that inpatient psychiatric services are medically necessary for this patient for a duration of 7 midnights for the treatment of Mood disorder.  Available alternative community resources do not meet the patient's mental health care needs.  I further attest that an established written individualized plan of care has been implemented and is outlined in the patient's medical records.

## 2024-11-29 NOTE — NURSING NOTE
Patient is pleasant, calm, cooperative. Patient currently getting his vitals down. Patient greeted this writer in the AM, and asked how he was doing. Patient does not appear pressured, or grandiose. He reports looking forward to eating breakfast today and starting the day. He denies AVH/SI/HI and reports adequate rest last night.    DISPLAY PLAN FREE TEXT

## 2024-11-29 NOTE — NURSING NOTE
"Patient is visible on the milieu throughout the evening socializing with peers. Reports \"being in my feelings earlier today but I'm doing better now.\" Denies anxiety, SI/HI/AVH. Says he is upset over current life stressors but feels grateful to receive current treatment and praises behavioral health staff. Says he would like to get back to doing activities he enjoys and feels \"hopeful.\"     Trazodone given for sleep upon request with scheduled medication at 2116, effective upon 2216 reassessment, now sleeping in bed.    "

## 2024-11-29 NOTE — PROGRESS NOTES
Progress Note - Behavioral Health   Name: Raudel Bland 34 y.o. male I MRN: [id number]  Unit/Bed#: -01 I Date of Admission: 11/20/2024   Date of Service: 11/29/2024 I Hospital Day: 8     Assessment & Plan  Mood disorder r/o MDD vs substance-induced mood disorder vs bipolar disorder vs intermittent explosive disorder  Differentials at this time: major depressive disorder versus substance-induced mood disorder versus bipolar disorder versus intermittent explosive disorder - patient screens positive for depression, however, it is unclear if the mood fluctuations he describes are consistent with gianna/hypomania as there was often co-occurring substance use and symptomatology described vaguely. There does appear to be difficulty controlling anger with outbursts that are often out of proportion to the situation at hand, consistent with IED    Raudel remains calm and cooperative, visible in the milieu, but mostly isolative to his room. Compliant with medications and meals. He is able to make his needs known and requests PRN Atarax for anxiety and nightmare.     Medication regimen as follows, no changes at this time:  Continue Depakote  mg at bed time for mood stabilization and irritability  Depakote level, CBC with diff, and CMP ordered for 11/24/2024 11/24/24 Depakote level 51.   Continue Zoloft 100 mg daily for depression and anxiety.  Continue Gabapentin 400 mg three times daily for anxiety and agitation.  Continue Melatonin 3 mg at bedtime for insomnia  No new medication changes as of 11/27/24    No associated orders from this encounter found during lookback period of 72 hours.  Generalized anxiety disorder  See principal problem    No associated orders from this encounter found during lookback period of 72 hours.  Methamphetamine use (HCC)  Encourage cessation   Supportive care on the unit   Providing outpatient follow up for drug rehabilitation.    No associated orders from this encounter found  "during lookback period of 72 hours.  Cannabis use disorder  Encourage cessation   Supportive care on the unit     No associated orders from this encounter found during lookback period of 72 hours.  Tobacco abuse  Encourage cessation   Nicotine patch 14 mg patch daily   Nicotine gum prn    No associated orders from this encounter found during lookback period of 72 hours.        ------------------------------------------------------------    Recommended Treatment Plan:   Behavioral checks every 15 minutes  Encourage participation in group therapy, milieu therapy and occupational therapy.  Assess for side effects of medications.  Collaboration with ROSE for medical co-morbidities as indicated.  Continue discussion with CM/SW to assist with obtaining collateral, disposition planning, and the implementation of patient-centered individualized plan of care.  Estimated Discharge Date: 12/4/2024    Risks, benefits and possible side effects of Medications: Risks, benefits, and possible side effects of medications have been explained to the patient, who verbalizes understanding    Legal status: 201    Disposition: living with friend      Subjective: Patient's chart was reviewed. Patient's progress and plan was discussed with treatment team. Per nursing report, Raudel has been calm and cooperative on the unit. He remains compliant with medications.     Prn medications over the past 24 hours: Atarax 50 mg for anxiety, improved. Trazodone 50 mg hs, for insomnia. Nicorette gum for nicotine withdrawal, effective.     Raudel was evaluated this morning for continuity of care. On examination, Raudel is well appearing, laying in bed, alert and awake. He states his mood is \"great.\" He reports sleeping great. His appetite has been very good. He denies adverse effects from medications. He denies active or passive suicidal ideation and homicidal ideation. He denies auditory or visual hallucinations. Last bm was yesterday    VS: Reviewed, " "within normal limits    Progress Toward Goals: medication and meal compliant, calm, cooperative    Psychiatric Review of Systems:  Behavior over the last 24 hours: improved  Sleep: improving  Appetite: adequate  Medication side effects: none verbalized  Medical ROS: Complete review of systems is negative except as noted above.    Vital signs in last 24 hours:  Temp:  [97.5 °F (36.4 °C)-98 °F (36.7 °C)] 98 °F (36.7 °C)  HR:  [75-92] 75  BP: (120-149)/(63-69) 123/63  Resp:  [16] 16  SpO2:  [96 %-99 %] 96 %  O2 Device: None (Room air)    Mental Status Exam:    Appearance:  overtly appearing  male, alert, appropriate grooming and hygiene, tattooed, and good eye contact   Behavior:  calm and cooperative   Speech:  spontaneous, soft, and coherent   Mood:  \"great\"   Affect:  normal range and intensity, appropriate   Thought Process:  Organized, logical, goal-directed   Associations: intact associations   Thought Content:  no verbalized delusions or overt paranoia   Perceptual Disturbances: denies current auditory or visual hallucinations and does not appear to be responding to internal stimuli at this time   Risk Potential: Suicidal ideation -  Denies at present   Homicidal ideation - Denies at present  Potential for aggression - Not at present   Sensorium:  oriented to person, place, and time/date   Memory:  recent and remote memory grossly intact   Consciousness:  alert and awake   Attention/Concentration: attention span and concentration are age appropriate   Insight:  improving   Judgment: improving   Gait/Station: in bed   Motor Activity: no abnormal movements     Current Medications:  Current Facility-Administered Medications   Medication Dose Route Frequency Provider Last Rate    acetaminophen  650 mg Oral Q6H PRN Amy Orta DO      aluminum-magnesium hydroxide-simethicone  30 mL Oral Q4H PRN Amy Orta DO      benztropine  1 mg Intramuscular Q4H PRN Max 6/day Amy Orta DO      benztropine  " 1 mg Oral Q4H PRN Max 6/day Amy Orta, DO      cyanocobalamin  1,000 mcg Oral Daily Ashley Robbie-Bajwa, CRNP      cyanocobalamin  1,000 mcg Intramuscular Once Ashley Robbie-Bajwa, CRNP      hydrOXYzine HCL  50 mg Oral Q6H PRN Max 4/day Amy Orta, DO      Or    diphenhydrAMINE  50 mg Intramuscular Q6H PRN Amy Orta, DO      divalproex sodium  750 mg Oral HS Amy Orta, DO      ergocalciferol  50,000 Units Oral Weekly Ashleyrhett Avalos-Garett, CRNP      folic acid  1 mg Oral Daily Ashley Jacobs, CRNP      gabapentin  400 mg Oral TID Amy Orta, DO      hydrOXYzine HCL  100 mg Oral Q6H PRN Max 4/day Amy Orta, DO      Or    LORazepam  2 mg Intramuscular Q6H PRN Amy Orta, DO      hydrOXYzine HCL  25 mg Oral Q6H PRN Max 4/day Amy Orta, DO      ibuprofen  400 mg Oral Q6H PRN Amy Orta, DO      ibuprofen  600 mg Oral Q8H PRN Amy Orta, DO      melatonin  3 mg Oral HS Amy Orta, DO      nicotine  1 patch Transdermal Daily Amy Orta, DO      nicotine polacrilex  2 mg Oral Q2H PRN Amy Orta, DO      OLANZapine  10 mg Oral Q3H PRN Max 3/day Amy Orta, DO      Or    OLANZapine  10 mg Intramuscular Q3H PRN Max 3/day Amy Orta, DO      OLANZapine  5 mg Oral Q3H PRN Max 6/day Amy Orta, DO      Or    OLANZapine  5 mg Intramuscular Q3H PRN Max 6/day Amy Orta, DO      OLANZapine  2.5 mg Oral Q3H PRN Max 8/day Amy Orta, DO      polyethylene glycol  17 g Oral Daily PRN Amy Orta, DO      propranolol  10 mg Oral Q8H PRN Amy Orta, DO      senna-docusate sodium  1 tablet Oral Daily PRN Amy Orta, DO      sertraline  100 mg Oral Daily Amy Orta, DO      traZODone  50 mg Oral HS PRN Amy Orta, DO         Behavioral Health Medications: all current active meds have been reviewed. Changes as in plan section above.    Laboratory results:  I have personally reviewed all pertinent laboratory/tests results.   No results found  for this or any previous visit (from the past 48 hours).     Counseling / Coordination of Care:  Total floor / unit time spent today 15 minutes. Greater than 50% of total time was spent with the patient and / or family counseling and / or coordination of care. A description of counseling / coordination of care:      Nkechi Majano DO 11/29/24  Psychiatry Residency, PGY-1  This note has been constructed using a voice recognition system. There may be translation, syntax, or grammatical errors. If you have any questions, please contact the dictating author.

## 2024-11-29 NOTE — PROGRESS NOTES
Pt seen for 1:1 engagement x30min. Pt and this writer spoke about the events leading up to his admission, reflecting on counterproductive behaviors, and future goals. Pt is pleasant and cooperative t/o. Pt feeling motivated for the future and discusses plans for success.

## 2024-11-29 NOTE — ASSESSMENT & PLAN NOTE
Encourage cessation   Supportive care on the unit   Providing outpatient follow up for drug rehabilitation.    No associated orders from this encounter found during lookback period of 72 hours.

## 2024-11-30 PROCEDURE — 99232 SBSQ HOSP IP/OBS MODERATE 35: CPT | Performed by: PSYCHIATRY & NEUROLOGY

## 2024-11-30 RX ADMIN — NICOTINE POLACRILEX 2 MG: 2 GUM, CHEWING BUCCAL at 15:10

## 2024-11-30 RX ADMIN — SERTRALINE HYDROCHLORIDE 100 MG: 100 TABLET ORAL at 08:59

## 2024-11-30 RX ADMIN — ERGOCALCIFEROL 50000 UNITS: 1.25 CAPSULE ORAL at 08:59

## 2024-11-30 RX ADMIN — MELATONIN TAB 3 MG 3 MG: 3 TAB at 21:02

## 2024-11-30 RX ADMIN — NICOTINE POLACRILEX 2 MG: 2 GUM, CHEWING BUCCAL at 19:42

## 2024-11-30 RX ADMIN — NICOTINE POLACRILEX 2 MG: 2 GUM, CHEWING BUCCAL at 12:12

## 2024-11-30 RX ADMIN — GABAPENTIN 400 MG: 400 CAPSULE ORAL at 21:02

## 2024-11-30 RX ADMIN — GABAPENTIN 400 MG: 400 CAPSULE ORAL at 08:59

## 2024-11-30 RX ADMIN — Medication 1 PATCH: at 08:59

## 2024-11-30 RX ADMIN — NICOTINE POLACRILEX 2 MG: 2 GUM, CHEWING BUCCAL at 17:35

## 2024-11-30 RX ADMIN — DIVALPROEX SODIUM 750 MG: 500 TABLET, EXTENDED RELEASE ORAL at 21:02

## 2024-11-30 RX ADMIN — GABAPENTIN 400 MG: 400 CAPSULE ORAL at 15:10

## 2024-11-30 RX ADMIN — FOLIC ACID 1 MG: 1 TABLET ORAL at 08:59

## 2024-11-30 RX ADMIN — CYANOCOBALAMIN TAB 1000 MCG 1000 MCG: 1000 TAB at 08:59

## 2024-11-30 RX ADMIN — TRAZODONE HYDROCHLORIDE 50 MG: 50 TABLET ORAL at 21:04

## 2024-11-30 NOTE — NURSING NOTE
Patient was out for meals and medications.  Patient returned to his room to rest.  He was pleasant and cooperative.  Patient denies SI/HI and A/V hallucinations.

## 2024-11-30 NOTE — PROGRESS NOTES
Psychiatric Progress Note - Department of Behavioral Health   Raudel Bland 34 y.o. male MRN: [id number]  Unit/Bed#: U 352-01 Encounter: [id number]    ASSESSMENT & PLAN     Assessment & Plan  Mood disorder r/o MDD vs substance-induced mood disorder vs bipolar disorder vs intermittent explosive disorder  Differentials at this time: major depressive disorder versus substance-induced mood disorder versus bipolar disorder versus intermittent explosive disorder - patient screens positive for depression, however, it is unclear if the mood fluctuations he describes are consistent with gianna/hypomania as there was often co-occurring substance use and symptomatology described vaguely. There does appear to be difficulty controlling anger with outbursts that are often out of proportion to the situation at hand, consistent with IED    Raudel remains calm and cooperative, visible in the milieu, but mostly isolative to his room. Compliant with medications and meals. He is able to make his needs known and requests PRN Atarax for anxiety and nightmare.     Medication regimen as follows, no changes at this time:  Continue Depakote  mg at bed time for mood stabilization and irritability  Depakote level, CBC with diff, and CMP ordered for 11/24/2024 11/24/24 Depakote level 51.   Continue Zoloft 100 mg daily for depression and anxiety.  Continue Gabapentin 400 mg three times daily for anxiety and agitation.  Continue Melatonin 3 mg at bedtime for insomnia  No new medication changes as of 11/27/24    No associated orders from this encounter found during lookback period of 72 hours.  Generalized anxiety disorder  See principal problem    No associated orders from this encounter found during lookback period of 72 hours.  Methamphetamine use (HCC)  Encourage cessation   Supportive care on the unit   Providing outpatient follow up for drug rehabilitation.    No associated orders from this encounter found during lookback  Nephrology  Patient: Estella Rao Date:10/4/2017   : 1960 Attending: Klaus Abdi MD   57 year old female        Chief complaint: cellulitis; GIOVANI, CKD4, ANEMIA    HPI:from initial consult 17  This is a 57 year old female with a past medical history significant for DM HTN morbid obesity, NEO R BKA neuropathy, chronic anemia and CKD4. Pt presents with Ed c/o pain erythema and swelling that began 5-6 days ago. Found to have cellulitis in LLE and is admitted for further management.     Nephrology consult is requested by Dr Tran for management of  CKD, anemia and fluid electrolyte issues. She has not followed up in nephrology clinic since 2014. Pt has CKD sec to DN. Baseline creat lately is at 1.8-2. But on admit, creat is at 2.6.     Subjective/recent events:  10/2/17 HD today , still oliguric, afebrile, no SOB   10/3/17 awake, no distress, no fever  10/4/17 Making little urine , HD today , afebrile     OBJECTIVE:  Vitals-noted in chart  Physical Exam:  General: alert, no acute distress  HEENT: Head atraumatic, normocephalic.   Sclera non-icteric.  Neck: Supple, no JVD. .Trach present   Chest/Lungs: trach mask. Non-labored.  Symmetrical expansion. Clear with diminished bases to ascultation  CVS:  S1,S2 well heard.  no pericardial rub heard.  Abdomen: morbidly obese,  Soft, non tender, non distended.   Neuro: alert, follows commands  Skin: Warm, dry.  No rashes.   Extremities:  No clubbing or cyanosis. + dependent edema/LLE edema  R BKA    Laboratory Results:    Recent Labs  Lab 10/02/17  2330 10/02/17  0615   SODIUM 133* 136   POTASSIUM 4.0 4.0   CHLORIDE 97* 98   CO2 31 31   ANIONGAP 9* 11   BUN 23* 41*   CREATININE 2.75* 3.92*   GFRNA 19 12   GFRA 21 14   GLUCOSE 206* 117*   CALCIUM 9.5 9.6   MG 1.9  --          Recent Labs  Lab 10/02/17  0615   WBC 9.8   HGB 8.1*   HCT 26.9*   *       No results found    No results found    Invalid input(s): ANASCREENWIT    Urine Panel  No results  found    Imaging/Procedures:  RENAL US (8/24/17):  FINDINGS:    There is no hydronephrosis. Right kidney measures 13.9 x 5.3 x 6.4 cm. Left kidney measures 12.8 x 6.8 x 5.7 cm. Subjectively kidneys are mildly atrophic. Bladder is not identified  IMPRESSION:  Subjectively mildly atrophic kidneys. No hydronephrosis. No acute sonographic findings.     Impression, Plan & Recommendations:  · Acute kidney injury on CKD3-4 vs progression of CKD. Baseline creatinine was previously upper 1s/low 2s. Creatinine higher than baseline upon admission. Pt is now post cardiac arrest on 8/23 with worsening renal function, likely progressed to acute tubular necrosis. Transferred to [facility] on 8/27 and started on CVVH, stopped on 8/29 as patient was hemodynamically stable/off pressors and transitioned to IHD.  Being dialyzed on MWF schedule.     watching for renal recovery   · So far no signs of recovery. Remains oliguric. S/p  Permcath placement 9/13/17     · S/p cardiac/PEA arrest  · Respiratory failure: post cardiac arrest. S/p trach 9/5. On trach mask.  · Hyperphosphatemia: sec to GIOVANI. Monitoring on binders. Is on renal formula TFs.    · LLE Cellulitis: received abx  · Anemia due to iron def/ and anemia in CKD:  Receiving PRBC as needed. On HERVE.  · Bone-mineral issues: PTH is better at 86; replacing vitamin D . Recheck is still low at 17.1, will give 50,000 IU weekly for 8 weeks     HD orders:  3.0 K, 2.5 calcium, 3.5 hours, 2-3 kg UF     Shant Flores MD  Transplant Nephrology       period of 72 hours.  Cannabis use disorder  Encourage cessation   Supportive care on the unit     No associated orders from this encounter found during lookback period of 72 hours.  Tobacco abuse  Encourage cessation   Nicotine patch 14 mg patch daily   Nicotine gum prn    No associated orders from this encounter found during lookback period of 72 hours.    Treatment Recommendations/Precautions:  Continue to promote patient participation in therapeutic milieu.  Continue medical management per medicine.  Continue previously prescribed psychotropic medication regimen; see below.  Continue behavioral health checks q.15 minutes.   Continue vitals per behavioral health unit protocol.  Discharge date per primary team; 201 commitment status.    SUBJECTIVE     Patient evaluated this a.m. for continuity of care. Patient was discussed at length with nursing and treatment team. Per nursing, patient remains calm and cooperative, intermittently interactive in the milieu, adherent to his medications without any acute adverse effects. No acute distress is noted throughout evaluation. Raudel Bland denies suicidal/homicidal ideation in addition to thoughts of self-injury, receptive to crisis planning provided by this writer, contacting for safety in the inpatient setting, admitting to an ability to appropriately confide in staff including this writer.  Patient appears superficial on approach, inappropriately bright at times, denying any/all psychiatric complaints/concerns, perseverating on discharge disposition, somewhat receptive to psychoeducation and supportive therapy provided by this writer    PSYCHIATRIC REVIEW OF SYSTEMS     Behavior over the last 24 hours: Unchanged  Sleep: Adequate  Appetite: Adequate  Medication side effects: No    REVIEW OF SYSTEMS   Review of systems: no complaints    OBJECTIVE     Vital Signs in Past 24 Hours:  Temp:  [97.3 °F (36.3 °C)] 97.3 °F (36.3 °C)  HR:  [67-75] 67  BP: (117-129)/(62-77)  117/77  Resp:  [16-17] 16  SpO2:  [98 %-100 %] 100 %  O2 Device: None (Room air)    Intake/Output in Past 24 hours:  No intake/output data recorded.  No intake/output data recorded.        Laboratory Results:  I have personally reviewed all pertinent laboratory/tests results.  Most Recent Labs:   Lab Results   Component Value Date    WBC 9.03 11/24/2024    RBC 4.08 11/24/2024    HGB 13.3 11/24/2024    HCT 39.1 11/24/2024     11/24/2024    RDW 12.5 11/24/2024    NEUTROABS 5.23 11/24/2024    SODIUM 142 11/24/2024    K 3.9 11/24/2024     11/24/2024    CO2 28 11/24/2024    BUN 21 11/24/2024    CREATININE 1.00 11/24/2024    GLUC 114 11/24/2024    GLUF 92 11/22/2024    CALCIUM 8.9 11/24/2024    AST 11 (L) 11/24/2024    ALT 13 11/24/2024    ALKPHOS 75 11/24/2024    TP 6.7 11/24/2024    ALB 4.3 11/24/2024    TBILI 0.21 11/24/2024    CHOLESTEROL 106 11/22/2024    HDL 45 11/22/2024    TRIG 78 11/22/2024    LDLCALC 45 11/22/2024    NONHDLC 61 11/22/2024    VALPROICTOT 51 11/24/2024    [id number] 1.000 11/22/2024    HGBA1C 5.3 11/22/2024     11/22/2024       Behavioral Health Medications: all current active meds have been reviewed and current meds:   Current Facility-Administered Medications:     acetaminophen (TYLENOL) tablet 650 mg, Q6H PRN    aluminum-magnesium hydroxide-simethicone (MAALOX) oral suspension 30 mL, Q4H PRN    benztropine (COGENTIN) injection 1 mg, Q4H PRN Max 6/day    benztropine (COGENTIN) tablet 1 mg, Q4H PRN Max 6/day    cyanocobalamin (VITAMIN B-12) tablet 1,000 mcg, Daily    cyanocobalamin injection 1,000 mcg, Once    hydrOXYzine HCL (ATARAX) tablet 50 mg, Q6H PRN Max 4/day **OR** diphenhydrAMINE (BENADRYL) injection 50 mg, Q6H PRN    divalproex sodium (DEPAKOTE ER) 24 hr tablet 750 mg, HS    ergocalciferol (VITAMIN D2) capsule 50,000 Units, Weekly    folic acid (FOLVITE) tablet 1 mg, Daily    gabapentin (NEURONTIN) capsule 400 mg, TID    hydrOXYzine HCL (ATARAX) tablet 100 mg, Q6H  PRN Max 4/day **OR** LORazepam (ATIVAN) injection 2 mg, Q6H PRN    hydrOXYzine HCL (ATARAX) tablet 25 mg, Q6H PRN Max 4/day    ibuprofen (MOTRIN) tablet 400 mg, Q6H PRN    ibuprofen (MOTRIN) tablet 600 mg, Q8H PRN    melatonin tablet 3 mg, HS    nicotine (NICODERM CQ) 14 mg/24hr TD 24 hr patch 1 patch, Daily    nicotine polacrilex (NICORETTE) gum 2 mg, Q2H PRN    OLANZapine (ZyPREXA) tablet 10 mg, Q3H PRN Max 3/day **OR** OLANZapine (ZyPREXA) IM injection 10 mg, Q3H PRN Max 3/day    OLANZapine (ZyPREXA) tablet 5 mg, Q3H PRN Max 6/day **OR** OLANZapine (ZyPREXA) IM injection 5 mg, Q3H PRN Max 6/day    OLANZapine (ZyPREXA) tablet 2.5 mg, Q3H PRN Max 8/day    polyethylene glycol (MIRALAX) packet 17 g, Daily PRN    propranolol (INDERAL) tablet 10 mg, Q8H PRN    senna-docusate sodium (SENOKOT S) 8.6-50 mg per tablet 1 tablet, Daily PRN    sertraline (ZOLOFT) tablet 100 mg, Daily    traZODone (DESYREL) tablet 50 mg, HS PRN.    Risks, benefits and possible side effects of Medications:   Risks, benefits, and possible side effects of medications explained to patient and patient verbalizes understanding.      Mental Status Evaluation:  Appearance:  age appropriate and casually dressed   Behavior:  psychomotor retardation, calm, somewhat superficial   Speech:  normal pitch and normal volume   Mood:  euthymic   Affect:  inappropriately bright at times   Language naming objects and repeating phrases   Thought Process:  goal directed and perserverative   Thought Content:  no overt obsessions or delusions   Perceptual Disturbances: None   Risk Potential: Suicidal Ideations none, Homicidal Ideations none, and Potential for Aggression No   Sensorium:  person, place, and time/date   Cognition:  recent and remote memory grossly intact   Consciousness:  alert and awake    Attention: attention span appeared shorter than expected for age   Insight:  Improving   Judgment: Improving   Intellect Not assessed   Gait/Station: normal  gait/station and normal balance   Motor Activity: no abnormal movements     Memory: Short and long term memory fair     Progress Toward Goals: Unchanged, as evidenced by their participation (or lack thereof) in individual, social and therapeutic milieu in addition to adherence to their medication regimen.    Recommended Treatment:   See above for assessment and plan.    Inpatient Psychiatric Certification: Based upon physical, mental and social evaluations, I certify that inpatient psychiatric services are medically necessary for this patient for a duration of greater than 2 midnights for the treatment of Unspecified mood (affective) disorder (HCC) including psychotropic medication management, participation in the therapeutic milieu and referrals as indicated. Available alternative community resources do not meet the patient's mental health care needs. I further attest that an established written individualized plan of care has been implemented and is outlined in the patient's medical records.    Counseling/Coordination of Care    I have expended greater than 15 minutes in which more than 50% of this time was expended in counseling/coordination of patient care relating to diagnostic results, prognosis, potential risks and benefits of management options, instructions for appropriate management, patient and/or collateral education, importance of adherence to management and/or risk factor reductions. Patient's rights, confidentiality, exceptions to confidentiality, use of electronic medical record including appropriate staff access to medical record regarding behavioral health services and consent to treatment were reviewed.    Note Share:     This note was not shared with the patient due to reasonable likelihood of causing patient harm     This note has been constructed using a voice recognition system. There may be translation, syntax,  or grammatical errors. If you have any questions, please contact the dictating  provider.    Jordan Christopher Holter, DO 11/30/24

## 2024-11-30 NOTE — NURSING NOTE
Pt calm and cooperative upon approach. Denies any SI/HI/AVH at this time. Pt visible in milieu, social with peers and staff. Pt requested PRN PO trazodone 50mg for insomnia during HS med pass, given @ 2119 - effective. Upon reassessment, pt visualized sleeping in bed. Pt is adherent with scheduled medications. Q15 minute checks maintained for safety.

## 2024-12-01 PROCEDURE — 99232 SBSQ HOSP IP/OBS MODERATE 35: CPT | Performed by: PSYCHIATRY & NEUROLOGY

## 2024-12-01 RX ADMIN — HYDROXYZINE HYDROCHLORIDE 50 MG: 50 TABLET, FILM COATED ORAL at 14:28

## 2024-12-01 RX ADMIN — NICOTINE POLACRILEX 2 MG: 2 GUM, CHEWING BUCCAL at 12:35

## 2024-12-01 RX ADMIN — DIVALPROEX SODIUM 750 MG: 500 TABLET, EXTENDED RELEASE ORAL at 21:07

## 2024-12-01 RX ADMIN — NICOTINE POLACRILEX 2 MG: 2 GUM, CHEWING BUCCAL at 14:18

## 2024-12-01 RX ADMIN — Medication 1 PATCH: at 09:04

## 2024-12-01 RX ADMIN — GABAPENTIN 400 MG: 400 CAPSULE ORAL at 09:04

## 2024-12-01 RX ADMIN — FOLIC ACID 1 MG: 1 TABLET ORAL at 09:04

## 2024-12-01 RX ADMIN — GABAPENTIN 400 MG: 400 CAPSULE ORAL at 21:07

## 2024-12-01 RX ADMIN — TRAZODONE HYDROCHLORIDE 50 MG: 50 TABLET ORAL at 21:07

## 2024-12-01 RX ADMIN — GABAPENTIN 400 MG: 400 CAPSULE ORAL at 16:02

## 2024-12-01 RX ADMIN — MELATONIN TAB 3 MG 3 MG: 3 TAB at 21:07

## 2024-12-01 RX ADMIN — CYANOCOBALAMIN TAB 1000 MCG 1000 MCG: 1000 TAB at 09:04

## 2024-12-01 RX ADMIN — SERTRALINE HYDROCHLORIDE 100 MG: 100 TABLET ORAL at 09:04

## 2024-12-01 NOTE — NURSING NOTE
Patient came to nurses station and stated he was feeling anxious and requested medication.  He rated his anxiety as moderate.  Patient was given 50 mg of atarax PO PRN.

## 2024-12-01 NOTE — NURSING NOTE
Patient denies SI/HI and A/V hallucinations.  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  Patient is pleasant and bright.  He is social with his peers.

## 2024-12-01 NOTE — PLAN OF CARE
Problem: DISCHARGE PLANNING  Goal: Discharge to home or other facility with appropriate resources  Description: INTERVENTIONS:  - Identify barriers to discharge w/patient and caregiver  - Arrange for needed discharge resources and transportation as appropriate  - Identify discharge learning needs (meds, wound care, etc.)  - Arrange for interpretive services to assist at discharge as needed  - Refer to Case Management Department for coordinating discharge planning if the patient needs post-hospital services based on physician/advanced practitioner order or complex needs related to functional status, cognitive ability, or social support system  Outcome: Progressing     Problem: SELF HARM/SUICIDALITY  Goal: Will have no self-injury during hospital stay  Description: INTERVENTIONS:  - Q 15 MINUTES: Routine safety checks  - Q WAKING SHIFT & PRN: Assess risk to determine if routine checks are adequate to maintain patient safety  - Encourage patient to participate actively in care by formulating a plan to combat response to suicidal ideation, identify supports and resources  Outcome: Progressing     Problem: DEPRESSION  Goal: Will be euthymic at discharge  Description: INTERVENTIONS:  - Administer medication as ordered  - Provide emotional support via 1:1 interaction with staff  - Encourage involvement in milieu/groups/activities  - Monitor for social isolation  Outcome: Progressing     Problem: SUBSTANCE USE/ABUSE  Goal: Will have no detox symptoms and will verbalize plan for changing substance-related behavior  Description: INTERVENTIONS:  - Monitor physical status and assess for symptoms of withdrawal  - Administer medication as ordered  - Provide emotional support with 1 on 1 interaction with staff  - Encourage recovery focused program/ addiction education  - Assess for verbalization of changing behaviors related to substance abuse  - Initiate consults and referrals as appropriate (Case Management, Spiritual Care,  etc.)  Outcome: Progressing  Goal: By discharge, will develop insight into their chemical dependency and sustain motivation to continue in recovery  Description: INTERVENTIONS:  - Attends all daily group sessions and scheduled AA groups  - Actively practices coping skills through participation in the therapeutic community and adherence to program rules  - Reviews and completes assignments from individual treatment plan  - Assist patient development of understanding of their personal cycle of addiction and relapse triggers  Outcome: Progressing  Goal: By discharge, patient will have ongoing treatment plan addressing chemical dependency  Description: INTERVENTIONS:  - Assist patient with resources and/or appointments for ongoing recovery based living  Outcome: Progressing     Problem: Ineffective Coping  Goal: Identifies ineffective coping skills  Outcome: Progressing  Goal: Identifies healthy coping skills  Outcome: Progressing  Goal: Demonstrates healthy coping skills  Outcome: Progressing  Goal: Participates in unit activities  Description: Interventions:  - Provide therapeutic environment   - Provide required programming   - Redirect inappropriate behaviors   Outcome: Progressing     Problem: Ineffective Coping  Goal: Cooperates with admission process  Description: Interventions:   - Complete admission process  Outcome: Completed

## 2024-12-01 NOTE — PROGRESS NOTES
Psychiatric Progress Note - Department of Behavioral Health   Raudel Bland 34 y.o. male MRN: [id number]  Unit/Bed#: U 352-01 Encounter: [id number]    ASSESSMENT & PLAN     Assessment & Plan  Mood disorder r/o MDD vs substance-induced mood disorder vs bipolar disorder vs intermittent explosive disorder  Differentials at this time: major depressive disorder versus substance-induced mood disorder versus bipolar disorder versus intermittent explosive disorder - patient screens positive for depression, however, it is unclear if the mood fluctuations he describes are consistent with gianna/hypomania as there was often co-occurring substance use and symptomatology described vaguely. There does appear to be difficulty controlling anger with outbursts that are often out of proportion to the situation at hand, consistent with IED    Raudel remains calm and cooperative, visible in the milieu, but mostly isolative to his room. Compliant with medications and meals. He is able to make his needs known and requests PRN Atarax for anxiety and nightmare.     Medication regimen as follows, no changes at this time:  Continue Depakote  mg at bed time for mood stabilization and irritability  Depakote level, CBC with diff, and CMP ordered for 11/24/2024 11/24/24 Depakote level 51.   Continue Zoloft 100 mg daily for depression and anxiety.  Continue Gabapentin 400 mg three times daily for anxiety and agitation.  Continue Melatonin 3 mg at bedtime for insomnia  No new medication changes as of 11/27/24    No associated orders from this encounter found during lookback period of 72 hours.  Generalized anxiety disorder  See principal problem    No associated orders from this encounter found during lookback period of 72 hours.  Methamphetamine use (HCC)  Encourage cessation   Supportive care on the unit   Providing outpatient follow up for drug rehabilitation.    No associated orders from this encounter found during lookback  period of 72 hours.  Cannabis use disorder  Encourage cessation   Supportive care on the unit     No associated orders from this encounter found during lookback period of 72 hours.  Tobacco abuse  Encourage cessation   Nicotine patch 14 mg patch daily   Nicotine gum prn    No associated orders from this encounter found during lookback period of 72 hours.    Treatment Recommendations/Precautions:  Continue to promote patient participation in therapeutic milieu.  Continue medical management per medicine.  Continue previously prescribed psychotropic medication regimen; see below.  Continue behavioral health checks q.15 minutes.   Continue vitals per behavioral health unit protocol.  Discharge date per primary team; 201 commitment status.    SUBJECTIVE     Patient evaluated this a.m. for continuity of care. Patient was discussed at length with nursing and treatment team. Per nursing, patient remains calm, cooperative, intermittently interactive in the milieu, adherent to his medications without any acute adverse effects. No acute distress is noted throughout evaluation. Raudel Bland denies suicidal/homicidal ideation in addition to thoughts of self-injury, receptive to crisis planning provided by this writer, contacting for safety in the inpatient setting, admitting to an ability to appropriately confide in staff including this writer. Patient denying any/all psychiatric complaints/concerns, superficial at times, inappropriately bright    PSYCHIATRIC REVIEW OF SYSTEMS     Behavior over the last 24 hours:  unchanged  Sleep: OK  Appetite: OK  Medication side effects: No    REVIEW OF SYSTEMS   Review of systems: no complaints    OBJECTIVE     Vital Signs in Past 24 Hours:  Temp:  [97.5 °F (36.4 °C)-98 °F (36.7 °C)] 97.5 °F (36.4 °C)  HR:  [66-88] 66  BP: (118-129)/(66-67) 118/67  Resp:  [16] 16  SpO2:  [98 %] 98 %  O2 Device: None (Room air)    Intake/Output in Past 24 hours:  No intake/output data recorded.  No  intake/output data recorded.        Laboratory Results:  I have personally reviewed all pertinent laboratory/tests results.  Most Recent Labs:   Lab Results   Component Value Date    WBC 9.03 11/24/2024    RBC 4.08 11/24/2024    HGB 13.3 11/24/2024    HCT 39.1 11/24/2024     11/24/2024    RDW 12.5 11/24/2024    NEUTROABS 5.23 11/24/2024    SODIUM 142 11/24/2024    K 3.9 11/24/2024     11/24/2024    CO2 28 11/24/2024    BUN 21 11/24/2024    CREATININE 1.00 11/24/2024    GLUC 114 11/24/2024    GLUF 92 11/22/2024    CALCIUM 8.9 11/24/2024    AST 11 (L) 11/24/2024    ALT 13 11/24/2024    ALKPHOS 75 11/24/2024    TP 6.7 11/24/2024    ALB 4.3 11/24/2024    TBILI 0.21 11/24/2024    CHOLESTEROL 106 11/22/2024    HDL 45 11/22/2024    TRIG 78 11/22/2024    LDLCALC 45 11/22/2024    NONHDLC 61 11/22/2024    VALPROICTOT 51 11/24/2024    [id number] 1.000 11/22/2024    HGBA1C 5.3 11/22/2024     11/22/2024       Behavioral Health Medications: all current active meds have been reviewed and current meds:   Current Facility-Administered Medications:     acetaminophen (TYLENOL) tablet 650 mg, Q6H PRN    aluminum-magnesium hydroxide-simethicone (MAALOX) oral suspension 30 mL, Q4H PRN    benztropine (COGENTIN) injection 1 mg, Q4H PRN Max 6/day    benztropine (COGENTIN) tablet 1 mg, Q4H PRN Max 6/day    cyanocobalamin (VITAMIN B-12) tablet 1,000 mcg, Daily    cyanocobalamin injection 1,000 mcg, Once    hydrOXYzine HCL (ATARAX) tablet 50 mg, Q6H PRN Max 4/day **OR** diphenhydrAMINE (BENADRYL) injection 50 mg, Q6H PRN    divalproex sodium (DEPAKOTE ER) 24 hr tablet 750 mg, HS    ergocalciferol (VITAMIN D2) capsule 50,000 Units, Weekly    folic acid (FOLVITE) tablet 1 mg, Daily    gabapentin (NEURONTIN) capsule 400 mg, TID    hydrOXYzine HCL (ATARAX) tablet 100 mg, Q6H PRN Max 4/day **OR** LORazepam (ATIVAN) injection 2 mg, Q6H PRN    hydrOXYzine HCL (ATARAX) tablet 25 mg, Q6H PRN Max 4/day    ibuprofen (MOTRIN) tablet  400 mg, Q6H PRN    ibuprofen (MOTRIN) tablet 600 mg, Q8H PRN    melatonin tablet 3 mg, HS    nicotine (NICODERM CQ) 14 mg/24hr TD 24 hr patch 1 patch, Daily    nicotine polacrilex (NICORETTE) gum 2 mg, Q2H PRN    OLANZapine (ZyPREXA) tablet 10 mg, Q3H PRN Max 3/day **OR** OLANZapine (ZyPREXA) IM injection 10 mg, Q3H PRN Max 3/day    OLANZapine (ZyPREXA) tablet 5 mg, Q3H PRN Max 6/day **OR** OLANZapine (ZyPREXA) IM injection 5 mg, Q3H PRN Max 6/day    OLANZapine (ZyPREXA) tablet 2.5 mg, Q3H PRN Max 8/day    polyethylene glycol (MIRALAX) packet 17 g, Daily PRN    propranolol (INDERAL) tablet 10 mg, Q8H PRN    senna-docusate sodium (SENOKOT S) 8.6-50 mg per tablet 1 tablet, Daily PRN    sertraline (ZOLOFT) tablet 100 mg, Daily    traZODone (DESYREL) tablet 50 mg, HS PRN.    Risks, benefits and possible side effects of Medications:   Risks, benefits, and possible side effects of medications explained to patient and patient verbalizes understanding.      Mental Status Evaluation:  Appearance:  age appropriate and casually dressed   Behavior:  psychomotor retardation   Speech:  normal pitch and normal volume   Mood:  euthymic   Affect:  Inappropriately bright at times   Language naming objects and repeating phrases   Thought Process:  goal directed   Thought Content:  No overt obsessions or delusions   Perceptual Disturbances: None   Risk Potential: Suicidal Ideations none, Homicidal Ideations none, and Potential for Aggression No   Sensorium:  person, place, and time/date   Cognition:  recent and remote memory grossly intact   Consciousness:  alert and awake    Attention: attention span appeared shorter than expected for age   Insight:  limited   Judgment: improving   Intellect Not assessed   Gait/Station: normal gait/station and normal balance   Motor Activity: no abnormal movements     Memory: Short and long term memory  fair     Progress Toward Goals: unchanged, as evidenced by their participation (or lack thereof) in  individual, social and therapeutic milieu in addition to adherence to their medication regimen.    Recommended Treatment:   See above for assessment and plan.    Inpatient Psychiatric Certification: Based upon physical, mental and social evaluations, I certify that inpatient psychiatric services are medically necessary for this patient for a duration of greater than 2 midnights for the treatment of Unspecified mood (affective) disorder (HCC) including psychotropic medication management, participation in the therapeutic milieu and referrals as indicated. Available alternative community resources do not meet the patient's mental health care needs. I further attest that an established written individualized plan of care has been implemented and is outlined in the patient's medical records.    Counseling/Coordination of Care    I have expended greater than 15 minutes in which more than 50% of this time was expended in counseling/coordination of patient care relating to diagnostic results, prognosis, potential risks and benefits of management options, instructions for appropriate management, patient and/or collateral education, importance of adherence to management and/or risk factor reductions. Patient's rights, confidentiality, exceptions to confidentiality, use of electronic medical record including appropriate staff access to medical record regarding behavioral health services and consent to treatment were reviewed.    Note Share:     This note was not shared with the patient due to reasonable likelihood of causing patient harm     This note has been constructed using a voice recognition system. There may be translation, syntax,  or grammatical errors. If you have any questions, please contact the dictating provider.    Jordan Christopher Holter, DO 12/01/24     arm pain

## 2024-12-02 PROCEDURE — 99232 SBSQ HOSP IP/OBS MODERATE 35: CPT | Performed by: PSYCHIATRY & NEUROLOGY

## 2024-12-02 RX ADMIN — GABAPENTIN 400 MG: 400 CAPSULE ORAL at 16:04

## 2024-12-02 RX ADMIN — NICOTINE POLACRILEX 2 MG: 2 GUM, CHEWING BUCCAL at 12:57

## 2024-12-02 RX ADMIN — GABAPENTIN 400 MG: 400 CAPSULE ORAL at 21:20

## 2024-12-02 RX ADMIN — Medication 1 PATCH: at 08:21

## 2024-12-02 RX ADMIN — GABAPENTIN 400 MG: 400 CAPSULE ORAL at 08:19

## 2024-12-02 RX ADMIN — TRAZODONE HYDROCHLORIDE 50 MG: 50 TABLET ORAL at 21:20

## 2024-12-02 RX ADMIN — SERTRALINE HYDROCHLORIDE 100 MG: 100 TABLET ORAL at 08:19

## 2024-12-02 RX ADMIN — FOLIC ACID 1 MG: 1 TABLET ORAL at 08:19

## 2024-12-02 RX ADMIN — CYANOCOBALAMIN TAB 1000 MCG 1000 MCG: 1000 TAB at 08:19

## 2024-12-02 RX ADMIN — NICOTINE POLACRILEX 2 MG: 2 GUM, CHEWING BUCCAL at 19:59

## 2024-12-02 RX ADMIN — DIVALPROEX SODIUM 750 MG: 500 TABLET, EXTENDED RELEASE ORAL at 21:20

## 2024-12-02 RX ADMIN — HYDROXYZINE HYDROCHLORIDE 100 MG: 50 TABLET, FILM COATED ORAL at 16:53

## 2024-12-02 RX ADMIN — NICOTINE POLACRILEX 2 MG: 2 GUM, CHEWING BUCCAL at 18:04

## 2024-12-02 RX ADMIN — MELATONIN TAB 3 MG 3 MG: 3 TAB at 21:20

## 2024-12-02 NOTE — PLAN OF CARE
Problem: DISCHARGE PLANNING  Goal: Discharge to home or other facility with appropriate resources  Description: INTERVENTIONS:  - Identify barriers to discharge w/patient and caregiver  - Arrange for needed discharge resources and transportation as appropriate  - Identify discharge learning needs (meds, wound care, etc.)  - Arrange for interpretive services to assist at discharge as needed  - Refer to Case Management Department for coordinating discharge planning if the patient needs post-hospital services based on physician/advanced practitioner order or complex needs related to functional status, cognitive ability, or social support system  Outcome: Progressing     Problem: SELF HARM/SUICIDALITY  Goal: Will have no self-injury during hospital stay  Description: INTERVENTIONS:  - Q 15 MINUTES: Routine safety checks  - Q WAKING SHIFT & PRN: Assess risk to determine if routine checks are adequate to maintain patient safety  - Encourage patient to participate actively in care by formulating a plan to combat response to suicidal ideation, identify supports and resources  Outcome: Progressing     Problem: DEPRESSION  Goal: Will be euthymic at discharge  Description: INTERVENTIONS:  - Administer medication as ordered  - Provide emotional support via 1:1 interaction with staff  - Encourage involvement in milieu/groups/activities  - Monitor for social isolation  Outcome: Progressing     Problem: SUBSTANCE USE/ABUSE  Goal: Will have no detox symptoms and will verbalize plan for changing substance-related behavior  Description: INTERVENTIONS:  - Monitor physical status and assess for symptoms of withdrawal  - Administer medication as ordered  - Provide emotional support with 1 on 1 interaction with staff  - Encourage recovery focused program/ addiction education  - Assess for verbalization of changing behaviors related to substance abuse  - Initiate consults and referrals as appropriate (Case Management, Spiritual Care,  etc.)  Outcome: Progressing  Goal: By discharge, will develop insight into their chemical dependency and sustain motivation to continue in recovery  Description: INTERVENTIONS:  - Attends all daily group sessions and scheduled AA groups  - Actively practices coping skills through participation in the therapeutic community and adherence to program rules  - Reviews and completes assignments from individual treatment plan  - Assist patient development of understanding of their personal cycle of addiction and relapse triggers  Outcome: Progressing  Goal: By discharge, patient will have ongoing treatment plan addressing chemical dependency  Description: INTERVENTIONS:  - Assist patient with resources and/or appointments for ongoing recovery based living  Outcome: Progressing     Problem: Ineffective Coping  Goal: Identifies ineffective coping skills  Outcome: Progressing  Goal: Identifies healthy coping skills  Outcome: Progressing  Goal: Demonstrates healthy coping skills  Outcome: Progressing  Goal: Participates in unit activities  Description: Interventions:  - Provide therapeutic environment   - Provide required programming   - Redirect inappropriate behaviors   Outcome: Progressing

## 2024-12-02 NOTE — NURSING NOTE
Patient has been in the milieu social and interacting with peers. He is very comfortable in the milieu. He denies S.I.H.I.A/H V/H  and he has been cooperative with HS scheduled medications. He requested and received Trazadone 50mgs po prn at 2107 and it was found to be effective when reassessed at 2105 as patient was asleep.

## 2024-12-02 NOTE — PROGRESS NOTES
12/02/24 0908   Team Meeting   Meeting Type Daily Rounds   Team Members Present   Team Members Present Physician;Nurse;   Physician Team Member Edith   [facility]   Patient/Family Present   Patient Present No   Patient's Family Present No     Pt visible on the unit. Med/meal compliant. Pleasant, calm, cooperative. PRN Trazodone for sleep. PRN Atarax yesterday for anxiety. Discharge tomorrow.

## 2024-12-02 NOTE — NURSING NOTE
Pt is calm, cooperative, social with peers and staff. Pt is going to groups, ADLs and taking medications as prescribed. Pt denies SI, HI, AH, VH and pain at this time. Pt denies any unmet needs and remains on q15 min checks for safety.

## 2024-12-02 NOTE — PROGRESS NOTES
Progress Note - Behavioral Health   Name: Raudel Bland 34 y.o. male I MRN: [id number]  Unit/Bed#: -01 I Date of Admission: 11/20/2024   Date of Service: 12/2/2024 I Hospital Day: 11     Assessment & Plan  Mood disorder r/o MDD vs substance-induced mood disorder vs bipolar disorder vs intermittent explosive disorder  Differentials at this time: major depressive disorder versus substance-induced mood disorder versus bipolar disorder versus intermittent explosive disorder - patient screens positive for depression, however, it is unclear if the mood fluctuations he describes are consistent with gianna/hypomania as there was often co-occurring substance use and symptomatology described vaguely. There does appear to be difficulty controlling anger with outbursts that are often out of proportion to the situation at hand, consistent with IED    Raudel remains calm and cooperative, visible in the milieu, but mostly isolative to his room. Compliant with medications and meals. He is able to make his needs known and requests PRN Atarax for anxiety and nightmare.     Medication regimen as follows, no changes at this time:  Continue Depakote  mg at bed time for mood stabilization and irritability  Depakote level, CBC with diff, and CMP ordered for 11/24/2024 11/24/24 Depakote level 51.   Continue Zoloft 100 mg daily for depression and anxiety.  Continue Gabapentin 400 mg three times daily for anxiety and agitation.  Continue Melatonin 3 mg at bedtime for insomnia  No new medication changes as of 12/02/24    No associated orders from this encounter found during lookback period of 72 hours.  Generalized anxiety disorder  See principal problem    No associated orders from this encounter found during lookback period of 72 hours.  Methamphetamine use (HCC)  Encourage cessation   Supportive care on the unit   Providing outpatient follow up for drug rehabilitation.    No associated orders from this encounter found  "during lookback period of 72 hours.  Cannabis use disorder  Encourage cessation   Supportive care on the unit     No associated orders from this encounter found during lookback period of 72 hours.  Tobacco abuse  Encourage cessation   Nicotine patch 14 mg patch daily   Nicotine gum prn    No associated orders from this encounter found during lookback period of 72 hours.        ------------------------------------------------------------    Recommended Treatment Plan:   Behavioral checks every 15 minutes  Encourage participation in group therapy, milieu therapy and occupational therapy.  Assess for side effects of medications.  Collaboration with [facility] for medical co-morbidities as indicated.  Continue discussion with CM/SW to assist with obtaining collateral, disposition planning, and the implementation of patient-centered individualized plan of care.  Estimated Discharge Date: 12/4/2024    Risks, benefits and possible side effects of Medications: Risks, benefits, and possible side effects of medications have been explained to the patient, who verbalizes understanding    Legal status: 201    Disposition: moving in to friends home, tentative d/c date 12/4/24      Subjective: Patient's chart was reviewed. Patient's progress and plan was discussed with treatment team. Per nursing report, Raudel has been calm and cooperative on the unit. He remains compliant with medications.     Prn medications over the past 24 hours: Atarax 1428 for anxiety effective, Trazodone 2017 for insomnia, effective.     Raudel was evaluated this morning for continuity of care. On examination, Raudel is laying in bed, cooperative and calm. He states his mood is \"great.\" He reports sleeping poorly secondary to sounds and lights on the unit. His appetite has been great. He denies adverse effects from medications. He denies active or passive suicidal ideation and homicidal ideation. He denies auditory or visual hallucinations. Last bowel movement " "was yesterday.     VS: Reviewed, within normal limits    Progress Toward Goals: medication and meal compliant, calm, cooperative    Psychiatric Review of Systems:  Behavior over the last 24 hours: unchanged  Sleep: frequent awakenings  Appetite: adequate  Medication side effects: none verbalized  Medical ROS: Complete review of systems is negative except as noted above.    Vital signs in last 24 hours:  Temp:  [97.2 °F (36.2 °C)-97.5 °F (36.4 °C)] 97.2 °F (36.2 °C)  HR:  [57-83] 57  BP: (121-147)/(61-70) 147/61  Resp:  [16] 16  SpO2:  [96 %-97 %] 96 %  O2 Device: None (Room air)    Mental Status Exam:    Appearance:  overtly appearing  male, alert, appropriate grooming and hygiene, tattooed, good eye contact, and wrapped in blanket   Behavior:  calm and cooperative   Speech:  spontaneous and coherent   Mood:  \"great\"   Affect:  normal range and intensity   Thought Process:  Organized, logical, goal-directed   Associations: intact associations   Thought Content:  no verbalized delusions or overt paranoia   Perceptual Disturbances: no reported hallucinations and does not appear to be responding to internal stimuli at this time   Risk Potential: Suicidal ideation -  Denies at present   Homicidal ideation - Denies at present  Potential for aggression - Not at present   Sensorium:  oriented to person, place, and time/date   Memory:  recent and remote memory grossly intact   Consciousness:  alert and awake   Attention/Concentration: attention span and concentration are age appropriate   Insight:  age appropriate and good   Judgment: good and improved   Gait/Station: in bed   Motor Activity: no abnormal movements     Current Medications:  Current Facility-Administered Medications   Medication Dose Route Frequency Provider Last Rate    acetaminophen  650 mg Oral Q6H PRN Amy Orta DO      aluminum-magnesium hydroxide-simethicone  30 mL Oral Q4H PRN Amy Orta DO      benztropine  1 mg Intramuscular Q4H " PRN Max 6/day Amy Orta, DO      benztropine  1 mg Oral Q4H PRN Max 6/day Amy Orta, DO      cyanocobalamin  1,000 mcg Oral Daily Ashley Arlene, CRNP      cyanocobalamin  1,000 mcg Intramuscular Once Ashley Jacobs, CRNP      hydrOXYzine HCL  50 mg Oral Q6H PRN Max 4/day Amy Orta, DO      Or    diphenhydrAMINE  50 mg Intramuscular Q6H PRN Amy Orta, DO      divalproex sodium  750 mg Oral HS Amy Orta, DO      ergocalciferol  50,000 Units Oral Weekly Ashley Jacobs, CRNP      folic acid  1 mg Oral Daily Ashley Jacobs, CRNP      gabapentin  400 mg Oral TID Amy Orta, DO      hydrOXYzine HCL  100 mg Oral Q6H PRN Max 4/day Amy Orta, DO      Or    LORazepam  2 mg Intramuscular Q6H PRN Amy Orta, DO      hydrOXYzine HCL  25 mg Oral Q6H PRN Max 4/day Amy Orta, DO      ibuprofen  400 mg Oral Q6H PRN Amy Orta, DO      ibuprofen  600 mg Oral Q8H PRN Amy Orta, DO      melatonin  3 mg Oral HS Amy Orta, DO      nicotine  1 patch Transdermal Daily Amy Orta, DO      nicotine polacrilex  2 mg Oral Q2H PRN Amy Orta, DO      OLANZapine  10 mg Oral Q3H PRN Max 3/day Amy Orta, DO      Or    OLANZapine  10 mg Intramuscular Q3H PRN Max 3/day Amy Orta, DO      OLANZapine  5 mg Oral Q3H PRN Max 6/day Amy Orta, DO      Or    OLANZapine  5 mg Intramuscular Q3H PRN Max 6/day Amy Orta, DO      OLANZapine  2.5 mg Oral Q3H PRN Max 8/day Amy Orta, DO      polyethylene glycol  17 g Oral Daily PRN Amy Orta, DO      propranolol  10 mg Oral Q8H PRN Amy Orta, DO      senna-docusate sodium  1 tablet Oral Daily PRN Amy Orta, DO      sertraline  100 mg Oral Daily Amy Orta, DO      traZODone  50 mg Oral HS PRN Amy Orta, DO         Behavioral Health Medications: all current active meds have been reviewed. Changes as in plan section above.    Laboratory results:  I have personally reviewed all  pertinent laboratory/tests results.   No results found for this or any previous visit (from the past 48 hours).     Counseling / Coordination of Care:  Total floor / unit time spent today 20 minutes. Greater than 50% of total time was spent with the patient and / or family counseling and / or coordination of care. A description of counseling / coordination of care:      Nkechi Majano DO 12/02/24  Psychiatry Residency, PGY-1  This note has been constructed using a voice recognition system. There may be translation, syntax, or grammatical errors. If you have any questions, please contact the dictating author.

## 2024-12-02 NOTE — ASSESSMENT & PLAN NOTE
Differentials at this time: major depressive disorder versus substance-induced mood disorder versus bipolar disorder versus intermittent explosive disorder - patient screens positive for depression, however, it is unclear if the mood fluctuations he describes are consistent with gianna/hypomania as there was often co-occurring substance use and symptomatology described vaguely. There does appear to be difficulty controlling anger with outbursts that are often out of proportion to the situation at hand, consistent with IED    Raudel remains calm and cooperative, visible in the milieu, but mostly isolative to his room. Compliant with medications and meals. He is able to make his needs known and requests PRN Atarax for anxiety and nightmare.     Medication regimen as follows, no changes at this time:  Continue Depakote  mg at bed time for mood stabilization and irritability  Depakote level, CBC with diff, and CMP ordered for 11/24/2024 11/24/24 Depakote level 51.   Continue Zoloft 100 mg daily for depression and anxiety.  Continue Gabapentin 400 mg three times daily for anxiety and agitation.  Continue Melatonin 3 mg at bedtime for insomnia  No new medication changes as of 12/02/24    No associated orders from this encounter found during lookback period of 72 hours.

## 2024-12-03 PROCEDURE — 99232 SBSQ HOSP IP/OBS MODERATE 35: CPT | Performed by: PSYCHIATRY & NEUROLOGY

## 2024-12-03 RX ORDER — TRAZODONE HYDROCHLORIDE 50 MG/1
50 TABLET, FILM COATED ORAL
Status: DISCONTINUED | OUTPATIENT
Start: 2024-12-03 | End: 2024-12-04 | Stop reason: HOSPADM

## 2024-12-03 RX ORDER — DIVALPROEX SODIUM 250 MG/1
750 TABLET, FILM COATED, EXTENDED RELEASE ORAL
Qty: 90 TABLET | Refills: 1 | Status: SHIPPED | OUTPATIENT
Start: 2024-12-03

## 2024-12-03 RX ORDER — ERGOCALCIFEROL 1.25 MG/1
50000 CAPSULE, LIQUID FILLED ORAL WEEKLY
Qty: 6 CAPSULE | Refills: 0 | Status: SHIPPED | OUTPATIENT
Start: 2024-12-07 | End: 2025-01-12

## 2024-12-03 RX ORDER — SERTRALINE HYDROCHLORIDE 100 MG/1
100 TABLET, FILM COATED ORAL DAILY
Qty: 30 TABLET | Refills: 1 | Status: SHIPPED | OUTPATIENT
Start: 2024-12-04

## 2024-12-03 RX ORDER — FOLIC ACID 1 MG/1
1 TABLET ORAL DAILY
Qty: 30 TABLET | Refills: 1 | Status: SHIPPED | OUTPATIENT
Start: 2024-12-04

## 2024-12-03 RX ORDER — TRAZODONE HYDROCHLORIDE 50 MG/1
50 TABLET, FILM COATED ORAL
Qty: 30 TABLET | Refills: 1 | Status: SHIPPED | OUTPATIENT
Start: 2024-12-03

## 2024-12-03 RX ADMIN — TRAZODONE HYDROCHLORIDE 50 MG: 50 TABLET ORAL at 21:09

## 2024-12-03 RX ADMIN — NICOTINE POLACRILEX 2 MG: 2 GUM, CHEWING BUCCAL at 15:28

## 2024-12-03 RX ADMIN — FOLIC ACID 1 MG: 1 TABLET ORAL at 08:06

## 2024-12-03 RX ADMIN — DIVALPROEX SODIUM 750 MG: 500 TABLET, EXTENDED RELEASE ORAL at 21:09

## 2024-12-03 RX ADMIN — Medication 1 PATCH: at 08:07

## 2024-12-03 RX ADMIN — MELATONIN TAB 3 MG 3 MG: 3 TAB at 21:09

## 2024-12-03 RX ADMIN — SERTRALINE HYDROCHLORIDE 100 MG: 100 TABLET ORAL at 08:06

## 2024-12-03 RX ADMIN — OLANZAPINE 10 MG: 10 TABLET, FILM COATED ORAL at 08:07

## 2024-12-03 RX ADMIN — GABAPENTIN 400 MG: 400 CAPSULE ORAL at 15:28

## 2024-12-03 RX ADMIN — GABAPENTIN 400 MG: 400 CAPSULE ORAL at 21:09

## 2024-12-03 RX ADMIN — GABAPENTIN 400 MG: 400 CAPSULE ORAL at 08:06

## 2024-12-03 RX ADMIN — NICOTINE POLACRILEX 2 MG: 2 GUM, CHEWING BUCCAL at 18:51

## 2024-12-03 RX ADMIN — CYANOCOBALAMIN TAB 1000 MCG 1000 MCG: 1000 TAB at 08:06

## 2024-12-03 NOTE — BH TRANSITION RECORD
Contact Information: If you have any questions, concerns, pended studies, tests and/or procedures, or emergencies regarding your inpatient behavioral health visit. Please contact [facility] [phone]  and ask to speak to a , nurse or physician. A contact is available 24 hours/ 7 days a week at this number.     Summary of Procedures Performed During your Stay:  Below is a list of major procedures performed during your hospital stay and a summary of results:  - Cardiac Procedures/Studies: ECG on 11/22/24 normal sinus rhythm with ventricular rate 80 bpm, qtc 436 msec.    Pending Studies (From admission, onward)      None          Please follow up on the above pending studies with your PCP and/or referring provider.

## 2024-12-03 NOTE — PROGRESS NOTES
12/03/24 1404   Team Meeting   Meeting Type Daily Rounds   Team Members Present   Team Members Present Physician;;Nurse   Physician Team Member Edith   [facility]   Care Management Team Member Hillary   Patient/Family Present   Patient Present No   Patient's Family Present No     Pt was given Atarax for anxiety. Pt's discharge is pending for tomorrow. Pt is medication and meal compliant. Pt is reporting he no longer wants rehab.

## 2024-12-03 NOTE — PROGRESS NOTES
Progress Note - Behavioral Health   Name: Raudel Bland 34 y.o. male I MRN: [id number]  Unit/Bed#: -01 I Date of Admission: 11/20/2024   Date of Service: 12/3/2024 I Hospital Day: 12     Assessment & Plan  Mood disorder r/o MDD vs substance-induced mood disorder vs bipolar disorder vs intermittent explosive disorder  Differentials at this time: major depressive disorder versus substance-induced mood disorder versus bipolar disorder versus intermittent explosive disorder - patient screens positive for depression, however, it is unclear if the mood fluctuations he describes are consistent with gianna/hypomania as there was often co-occurring substance use and symptomatology described vaguely. There does appear to be difficulty controlling anger with outbursts that are often out of proportion to the situation at hand, consistent with IED    Raudel remains calm and cooperative, visible in the milieu, but mostly isolative to his room. Compliant with medications and meals. He is able to make his needs known and requests PRN Atarax for anxiety and nightmare.     Medication regimen as follows, no changes at this time:  Continue Depakote  mg at bed time for mood stabilization and irritability  Depakote level, CBC with diff, and CMP ordered for 11/24/2024 11/24/24 Depakote level 51.   Continue Zoloft 100 mg daily for depression and anxiety.  Continue Gabapentin 400 mg three times daily for anxiety and agitation.  Continue Melatonin 3 mg at bedtime for insomnia  Continue Trazodone 50 mg hs    No associated orders from this encounter found during lookback period of 72 hours.  Generalized anxiety disorder  See principal problem    No associated orders from this encounter found during lookback period of 72 hours.  Methamphetamine use (HCC)  Encourage cessation   Supportive care on the unit   Providing outpatient follow up for drug rehabilitation.    No associated orders from this encounter found during lookback  "period of 72 hours.  Cannabis use disorder  Encourage cessation   Supportive care on the unit     No associated orders from this encounter found during lookback period of 72 hours.  Tobacco abuse  Encourage cessation   Nicotine patch 14 mg patch daily   Nicotine gum prn    No associated orders from this encounter found during lookback period of 72 hours.        ------------------------------------------------------------    Recommended Treatment Plan:   Behavioral checks every 15 minutes  Encourage participation in group therapy, milieu therapy and occupational therapy.  Assess for side effects of medications.  Collaboration with [facility] for medical co-morbidities as indicated.  Continue discussion with CM/SW to assist with obtaining collateral, disposition planning, and the implementation of patient-centered individualized plan of care.  Estimated Discharge Date: 12/4/2024    Risks, benefits and possible side effects of Medications: Risks, benefits, and possible side effects of medications have been explained to the patient, who verbalizes understanding    Legal status: 201    Disposition: [facility] house      Subjective: Patient's chart was reviewed. Patient's progress and plan was discussed with treatment team. Per nursing report, Raudel has been calm and cooperative on the unit. He remains compliant with medications.     Prn medications over the past 24 hours: Hydroxyzine at 1653 for anxiety, effective. Zyprexa 10 mg 0853 for agitation.     Raudel was evaluated this morning for continuity of care. On examination, Raudel is calm and cooperative. He states his mood is \"fine.\" He reports sleeping poorly due to frequent awakenings. His appetite has been good. He denies adverse effects from medications. He denies active or passive suicidal ideation and homicidal ideation. He denies auditory or visual hallucinations. Endorses bm yesterday. Patient was agitated due to another peer on the unit being very disruptive and rude " "so received Zyprexa prn and was sleepy during interview.     VS: Reviewed, within normal limits    Progress Toward Goals: medication and meal compliant, calm, cooperative    Psychiatric Review of Systems:  Behavior over the last 24 hours: unchanged  Sleep: frequent awakenings  Appetite: adequate  Medication side effects: none verbalized  Medical ROS: Complete review of systems is negative except as noted above.    Vital signs in last 24 hours:  Temp:  [96.6 °F (35.9 °C)-98 °F (36.7 °C)] 96.6 °F (35.9 °C)  HR:  [64-80] 64  BP: (119-128)/(57-65) 119/65  Resp:  [16] 16  SpO2:  [97 %-98 %] 98 %  O2 Device: None (Room air)    Mental Status Exam:    Appearance:  overtly appearing  male, drowsy, and tattooed   Behavior:  calm, cooperative, and laying in bed   Speech:  spontaneous, slow, soft, and coherent   Mood:  \"fine\"   Affect:  constricted   Thought Process:  Organized, logical, goal-directed   Associations: intact associations   Thought Content:  no verbalized delusions or overt paranoia   Perceptual Disturbances: no reported hallucinations, denies current auditory or visual hallucinations, and does not appear to be responding to internal stimuli at this time   Risk Potential: Suicidal ideation -  Denies at present   Homicidal ideation - Denies at present  Potential for aggression - Not at present   Sensorium:  oriented to person and place   Memory:  recent and remote memory grossly intact   Consciousness:  sedated   Attention/Concentration: decreased concentration and decreased attention span   Insight:  improving   Judgment: improving   Gait/Station: in bed   Motor Activity: no abnormal movements     Current Medications:  Current Facility-Administered Medications   Medication Dose Route Frequency Provider Last Rate    acetaminophen  650 mg Oral Q6H PRN Amy Orta DO      aluminum-magnesium hydroxide-simethicone  30 mL Oral Q4H PRN Amy Orta DO      benztropine  1 mg Intramuscular Q4H PRN Max " 6/day Amy Orta, DO      benztropine  1 mg Oral Q4H PRN Max 6/day Amy Orta, DO      cyanocobalamin  1,000 mcg Oral Daily Ashley Arlene, CRNP      cyanocobalamin  1,000 mcg Intramuscular Once Ashleyrhett Jacobs, CRNP      hydrOXYzine HCL  50 mg Oral Q6H PRN Max 4/day Amy Orta, DO      Or    diphenhydrAMINE  50 mg Intramuscular Q6H PRN Amy Orta, DO      divalproex sodium  750 mg Oral HS Amy Orta, DO      ergocalciferol  50,000 Units Oral Weekly Ashley Jacobs, CRNP      folic acid  1 mg Oral Daily Ashley Jacobs, CRNP      gabapentin  400 mg Oral TID Amy Orta, DO      hydrOXYzine HCL  100 mg Oral Q6H PRN Max 4/day Amy Orta, DO      Or    LORazepam  2 mg Intramuscular Q6H PRN Amy Orta, DO      hydrOXYzine HCL  25 mg Oral Q6H PRN Max 4/day Amy Orta, DO      ibuprofen  400 mg Oral Q6H PRN Amy Orta, DO      ibuprofen  600 mg Oral Q8H PRN Amy Orta, DO      melatonin  3 mg Oral HS Amy Orta, DO      nicotine  1 patch Transdermal Daily Amy Orta, DO      nicotine polacrilex  2 mg Oral Q2H PRN Amy Orta, DO      OLANZapine  10 mg Oral Q3H PRN Max 3/day Amy Orta, DO      Or    OLANZapine  10 mg Intramuscular Q3H PRN Max 3/day Amy Orta, DO      OLANZapine  5 mg Oral Q3H PRN Max 6/day Amy Orta, DO      Or    OLANZapine  5 mg Intramuscular Q3H PRN Max 6/day Amy Orta, DO      OLANZapine  2.5 mg Oral Q3H PRN Max 8/day Amy Orta, DO      polyethylene glycol  17 g Oral Daily PRN Amy Orta, DO      propranolol  10 mg Oral Q8H PRN Amy Orta, DO      senna-docusate sodium  1 tablet Oral Daily PRN Amy Orta, DO      sertraline  100 mg Oral Daily Amy Orta, DO      traZODone  50 mg Oral HS Nkechi Majano, DO         Behavioral Health Medications: all current active meds have been reviewed. Changes as in plan section above.    Laboratory results:  I have personally reviewed all pertinent  laboratory/tests results.   No results found for this or any previous visit (from the past 48 hours).     Counseling / Coordination of Care:  Total floor / unit time spent today 20 minutes. Greater than 50% of total time was spent with the patient and / or family counseling and / or coordination of care. A description of counseling / coordination of care:      Nkechi Majano DO 12/03/24  Psychiatry Residency, PGY-1  This note has been constructed using a voice recognition system. There may be translation, syntax, or grammatical errors. If you have any questions, please contact the dictating author.

## 2024-12-03 NOTE — NURSING NOTE
Pt requesting a medication to help with severe agitation related to a peers attitude and treatment towards staff. Pt states he is very angry about how this peer is behaving and he just wants to remain in control. Prn zyprexa 10 mg po given @ 0807 for severe agitation. Reassessed @ 0907 and pt reports feeling better and is currently resting in bed. Prn effective.

## 2024-12-03 NOTE — ASSESSMENT & PLAN NOTE
Differentials at this time: major depressive disorder versus substance-induced mood disorder versus bipolar disorder versus intermittent explosive disorder - patient screens positive for depression, however, it is unclear if the mood fluctuations he describes are consistent with gianna/hypomania as there was often co-occurring substance use and symptomatology described vaguely. There does appear to be difficulty controlling anger with outbursts that are often out of proportion to the situation at hand, consistent with IED    Raudel remains calm and cooperative, visible in the milieu, but mostly isolative to his room. Compliant with medications and meals. He is able to make his needs known and requests PRN Atarax for anxiety and nightmare.     Medication regimen as follows, no changes at this time:  Continue Depakote  mg at bed time for mood stabilization and irritability  Depakote level, CBC with diff, and CMP ordered for 11/24/2024 11/24/24 Depakote level 51.   Continue Zoloft 100 mg daily for depression and anxiety.  Continue Gabapentin 400 mg three times daily for anxiety and agitation.  Continue Melatonin 3 mg at bedtime for insomnia  Continue Trazodone 50 mg hs    Orders from past 72 hours:    divalproex sodium (DEPAKOTE ER) 250 mg 24 hr tablet; Take 3 tablets (750 mg total) by mouth daily at bedtime    sertraline (ZOLOFT) 100 mg tablet; Take 1 tablet (100 mg total) by mouth daily

## 2024-12-03 NOTE — ASSESSMENT & PLAN NOTE
Differentials at this time: major depressive disorder versus substance-induced mood disorder versus bipolar disorder versus intermittent explosive disorder - patient screens positive for depression, however, it is unclear if the mood fluctuations he describes are consistent with gianna/hypomania as there was often co-occurring substance use and symptomatology described vaguely. There does appear to be difficulty controlling anger with outbursts that are often out of proportion to the situation at hand, consistent with IED    Raudel remains calm and cooperative, visible in the milieu, but mostly isolative to his room. Compliant with medications and meals. He is able to make his needs known and requests PRN Atarax for anxiety and nightmare.     Medication regimen as follows, no changes at this time:  Continue Depakote  mg at bed time for mood stabilization and irritability  Depakote level, CBC with diff, and CMP ordered for 11/24/2024 11/24/24 Depakote level 51.   Continue Zoloft 100 mg daily for depression and anxiety.  Continue Gabapentin 400 mg three times daily for anxiety and agitation.  Continue Melatonin 3 mg at bedtime for insomnia  Continue Trazodone 50 mg hs    No associated orders from this encounter found during lookback period of 72 hours.

## 2024-12-03 NOTE — NURSING NOTE
Pt has been visible on the unit periodically and social with select peers. Medication and meal compliant. Encouraged to attend groups. Denies SI/HI/AH/VH at this time. Denies any unmet needs or complaints at this time.

## 2024-12-03 NOTE — NURSING NOTE
"Pt visible intermittently, social with select peers and staff. Pt calm and cooperative. Pt requested and given PRN trazodone 50mg for insomnia at 21:20; effective. Pt requesting that this be scheduled \"because I can't sleep without it.\" Pt denies SI/HI/AH/VH, is medication adherent.   "
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
## 2024-12-03 NOTE — SOCIAL WORK
Cm attempted to meet with Pt. Cm knocked on the door several times and stated Pt's name. Pt did not respond. Cm will attempt tomorrow.

## 2024-12-04 VITALS
HEIGHT: 68 IN | BODY MASS INDEX: 21.95 KG/M2 | SYSTOLIC BLOOD PRESSURE: 146 MMHG | WEIGHT: 144.8 LBS | RESPIRATION RATE: 16 BRPM | OXYGEN SATURATION: 99 % | DIASTOLIC BLOOD PRESSURE: 65 MMHG | TEMPERATURE: 97.4 F | HEART RATE: 63 BPM

## 2024-12-04 PROCEDURE — 99238 HOSP IP/OBS DSCHRG MGMT 30/<: CPT | Performed by: PSYCHIATRY & NEUROLOGY

## 2024-12-04 RX ADMIN — SERTRALINE HYDROCHLORIDE 100 MG: 100 TABLET ORAL at 08:41

## 2024-12-04 RX ADMIN — CYANOCOBALAMIN TAB 1000 MCG 1000 MCG: 1000 TAB at 08:41

## 2024-12-04 RX ADMIN — POLYETHYLENE GLYCOL 3350 17 G: 17 POWDER, FOR SOLUTION ORAL at 08:41

## 2024-12-04 RX ADMIN — FOLIC ACID 1 MG: 1 TABLET ORAL at 08:41

## 2024-12-04 RX ADMIN — GABAPENTIN 400 MG: 400 CAPSULE ORAL at 08:41

## 2024-12-04 RX ADMIN — Medication 1 PATCH: at 08:42

## 2024-12-04 NOTE — DISCHARGE INSTR - APPOINTMENTS
Behavioral Health Nurse Navigator, Sabiha or Cindy will be calling you after your discharge, on the phone number that you provided.  They will be available as an additional support, if needed.   If you wish to speak with Sabiha, you may contact her at [phone].

## 2024-12-04 NOTE — PLAN OF CARE
Problem: DISCHARGE PLANNING  Goal: Discharge to home or other facility with appropriate resources  Description: INTERVENTIONS:  - Identify barriers to discharge w/patient and caregiver  - Arrange for needed discharge resources and transportation as appropriate  - Identify discharge learning needs (meds, wound care, etc.)  - Arrange for interpretive services to assist at discharge as needed  - Refer to Case Management Department for coordinating discharge planning if the patient needs post-hospital services based on physician/advanced practitioner order or complex needs related to functional status, cognitive ability, or social support system  Outcome: Adequate for Discharge     Problem: SELF HARM/SUICIDALITY  Goal: Will have no self-injury during hospital stay  Description: INTERVENTIONS:  - Q 15 MINUTES: Routine safety checks  - Q WAKING SHIFT & PRN: Assess risk to determine if routine checks are adequate to maintain patient safety  - Encourage patient to participate actively in care by formulating a plan to combat response to suicidal ideation, identify supports and resources  Outcome: Adequate for Discharge     Problem: DEPRESSION  Goal: Will be euthymic at discharge  Description: INTERVENTIONS:  - Administer medication as ordered  - Provide emotional support via 1:1 interaction with staff  - Encourage involvement in milieu/groups/activities  - Monitor for social isolation  Outcome: Adequate for Discharge     Problem: SUBSTANCE USE/ABUSE  Goal: Will have no detox symptoms and will verbalize plan for changing substance-related behavior  Description: INTERVENTIONS:  - Monitor physical status and assess for symptoms of withdrawal  - Administer medication as ordered  - Provide emotional support with 1 on 1 interaction with staff  - Encourage recovery focused program/ addiction education  - Assess for verbalization of changing behaviors related to substance abuse  - Initiate consults and referrals as appropriate  (Case Management, Spiritual Care, etc.)  Outcome: Adequate for Discharge  Goal: By discharge, will develop insight into their chemical dependency and sustain motivation to continue in recovery  Description: INTERVENTIONS:  - Attends all daily group sessions and scheduled AA groups  - Actively practices coping skills through participation in the therapeutic community and adherence to program rules  - Reviews and completes assignments from individual treatment plan  - Assist patient development of understanding of their personal cycle of addiction and relapse triggers  Outcome: Adequate for Discharge  Goal: By discharge, patient will have ongoing treatment plan addressing chemical dependency  Description: INTERVENTIONS:  - Assist patient with resources and/or appointments for ongoing recovery based living  Outcome: Adequate for Discharge     Problem: Ineffective Coping  Goal: Identifies ineffective coping skills  Outcome: Adequate for Discharge  Goal: Identifies healthy coping skills  Outcome: Adequate for Discharge  Goal: Demonstrates healthy coping skills  Outcome: Adequate for Discharge  Goal: Participates in unit activities  Description: Interventions:  - Provide therapeutic environment   - Provide required programming   - Redirect inappropriate behaviors   Outcome: Adequate for Discharge

## 2024-12-04 NOTE — PROGRESS NOTES
12/04/24 1220   Activity/Group Checklist   Group Admission/Discharge   Attendance Attended   Attendance Duration (min) 0-15   Interactions Interacted appropriately   Affect/Mood Appropriate   Goals Achieved Identified feelings;Identified triggers;Identified relapse prevention strategies;Identified medication adherence strategies;Discussed safety plan;Discussed coping strategies;Discussed discharge plans;Identified resources and support systems;Able to listen to others;Able to reflect/comment on own behavior;Able to self-disclose;Able to recieve feedback     Relapse prevention plan completed with pt. Pt pleasant and cooperative. Pt completed plan with some assistance. Pt looking forward to discharge and appears future oriented discussing goals to better himself.

## 2024-12-04 NOTE — NURSING NOTE
"Pt appropriate, visible and social with peers throughout evening, walking unit calmly and requesting nicotine gum intermittently. Pt denies current unmet needs, states he is glad the trazodone is now scheduled, \"but I'm leaving tomorrow anyway.\" Pt denies SI/HI/AH/VH.   "

## 2024-12-04 NOTE — SOCIAL WORK
Pt to D/C today. Pt denies SI/HI/AVH. Pt oriented x3. Pt to d/c to friends home and friend will  upon discharge. Pt to follow up with [facility] on Friday 12/6/24 at 12:00 pm. Scripts sent to  pharmacy and given to Pt upon discharge.     Discharge Address: Unknown  Phone:  No phone number at this time.   [email or web address]

## 2024-12-04 NOTE — NURSING NOTE
Pt is visible on the unit and social with peers and staff. Excited for discharge today. Med and meal compliant. Denies SI/HI/AH/VH. AVS gone over with pt and pt verbalized understanding. When discussing smoking cessation pt states he is not ready to quit but is aware of resources should he change his mind at a later time. Pt medications brought to unit and verified to be correct by this writer. Pt left unit with belongings and meds at his side.

## 2024-12-04 NOTE — DISCHARGE INSTR - OTHER ORDERS
CRISIS INFORMATION  If you are experiencing a mental health emergency, you may call the [facility] Crisis Intervention Office 24 hours a day, 7 days per week at [phone].    In [facility], call [phone].    Warm[facility] is a confidential 24/7 telephone support service manned by trained mental health consumers.  [facility] provides support, a listening ear and can provide information about available services.[facility] specializes in the concerns of mental health consumers, their families and friends.  However, we are also here for anyone who has a mental health concern, is confused about or just doesn't know anything about mental health or where to get information.  To reach [facility], call [phone].    HOW TO GET SUBSTANCE ABUSE HELP:  If you or someone you know has a drug or alcohol problem, there is help:  [facility] Drug & Alcohol Abuse Services: [phone]  [facility] Drug & Alcohol Abuse Services: [phone]  An assessment is the first step.   In addition to those listed there are other programs available in the area but assessment is best to determine an appropriate level of care.  If you DO NOT have Medical Assistance (MA) or Private Insurance, an assessment can be scheduled at one of these providers:  [facility]  [address] [place] [address]  [phone]   [facility]  [address] [place] [address]  [phone]   [address] [place] [address]  [phone]   [facility]  [address] [place] [address]  [phone]   [facility]  [address] [place] [address]  [phone]   [facility]  [address] [place] [address]  [phone]   [facility]  [address], [place] [address]  [phone]     If you HAVE Medical Assistance, an assessment can be scheduled at one of these providers:  [facility]  [address] [place] [address]  [phone]   [facility]  [address] [place] [address]  [phone]   [facility]  [address], 1st Floor, [place] [address]  [phone]  [address] [place] [address], [address], [place] [address] [phone]   [address] [address]  [phone]   [facility]  [address] [facility] [place] [address]  [phone]   [facility] ([facility])  [address] [place] [address]  [phone]   [facility]  [address] [place] [address]  [phone]   [facility]  [address] [place] [address]  [phone]   [facility]  [address] [place] [address]  [phone]   [facility]  [address], [place] [address]  [phone]     If you HAVE Private Insurance, an assessment can be scheduled at one of these providers.  Please contact these Providers to determine if they are in your network plan:  [facility]  [address], [place] [address]  [phone]   [address] [address]  [phone]   [facility]  [address] [facility] [place] [address]  [phone]   [facility] ([facility])  [address] [place] [address]  [phone]   [facility]  [address] [place] [address]  [phone]   [facility]  [address], [place] [address]  [phone]     From the [facility] website [place].gov[place]guides/opioid-epidemic/#GetNaloxone    How do I get naloxone?  Family members and friends can access naloxone by:    Obtaining a prescription from their family doctor  Using the standing order issued by Acting Physician General Lillian Batres. A standing order is a prescription written  for the general public, rather than specifically for an individual.  To use the standing order, print it and take it with you to the pharmacy or have the digital version on your phone. Download the standing order from the Department of Health (PDF).    If you are unable to print it or use the digital version, the standing order is kept on file at many pharmacies. If a pharmacy does not have it on file, they may have the ability to look it up.    Naloxone prescriptions can be filled at most pharmacies. Although the medication might not be available for same-day pickup, it often can be ordered and available within a day or two.      [facility] accepts adults, 18 years and older, who identify themselves as homeless. Adults must be able to navigate multiple flights of stairs and independently care for themselves due to the construction of the facility in the [facility]. [facility] is not able to provide shelter to minors under 18 years of age. Individuals presenting at [facility] who are unable to meet the above criteria will be assisted by [facility] through contacting [facility] authorities for assistance.  Shelter Guests are required to obtain a voucher from the [place] [employer] [facility] located at   [address] [place] [address].  Shelter doors open at 5 PM and will close after 8 PM. Guests will not be allowed entrance AFTER 8 PM unless they have made prior arrangements with the Shelter Manager or have police escort. Guests are asked to not congregate outside the shelter before 5 PM.   Guest admittance to the shelter is on the [address] side of the building.   Dinner is provided every night from 5:30 PM to 7:30 PM.  Showers are available if volunteers are present. Partner service providers are accessible at the shelter during the weeknights. List of visiting partner service providers will be posted on the Service Provider Calendar in the shelter.  Guests are woken at 6 AM  every day and are required to leave the premises by 6:45 AM.  [address] [place] [address]  [phone]     [facility]  The Warming Station in the [facility] provides shelter, food, and a safe place for those experiencing homelessness in our community.  The Warming Station is open to welcome guests from November 15 through April 15. Our doors are open each night from 7:00PM-7:00AM (Intake hours: 7:00PM-9:00PM) to provide this critical service for our neighbors.  Our Warming Station guests will always find themselves greeted with kindness and a safe place to sleep. The [facility] Station provides a safe place to sleep and a dignified sense of community for neighbors in need.  The Y also serves healthy, prepared dinner seven days a week to our Warming Station guests.  [address] [place] [address]  [phone]   [facility]     The [facility] provides safe housing, nutritionally balanced meals, clothing, hot showers, and access to medical care. Additionally, we connect the men in our care to a wide variety of local social service and support organizations for men 18 years old and older. How to access our Emergency Shelter:  Come to the Emergency Shelter at [address] [place] [address], during intake hours, starting at 4:30 pm with a photo ID. The Emergency Shelter is closed from 8 am to 4:30 pm. On the weekends and during inclement weather, the Emergency Shelter is open all day.  Call us at [phone] ext [phone] at any time, and we will give you instructions on how to access our facility and to learn more about the services we provide.  We offer our services to all men over 18 regardless of race, color, creed, political affiliation, Episcopalian affiliation, or sexual orientation.  If you’re under the age of 18, a woman, or have dependent children, we recommend you call 211 from any phone to find shelter and other services.  211 is the citysocializer [facility]’s direct phone line for “coordinated care” in the [facility].  They have expertise and access to a wide range of housing options.  If you are a man over the age of 18, without dependent children living with you, they will very likely direct you back to us.  But if you are in any other demographic or special needs group, they have numerous options available for you. Call 211. For more information reach out to our  [phone] ext [phone]              [facility] is a program-driven, transitional shelter for homeless, single individuals. It is also a drop-in program for low-income adults, particularly those who have difficulty with the tasks of daily life.    OUR BELIEF   [facility] believes that in building lifelong success, a stable foundation comes first and foremost. For this reason, [facility]’s programming and individualized approach to case management integrated restorative practices with a holistic view of self-recovery.    [facility]  Housed in a two-story building on [facility] in [place], the shelter has the capacity to house 50 individuals. Each of those individuals is provided with an in-house .    DAY [facility] also operates a Daytime Drop-In Program that is open Monday-Friday at noon. Last year we served over 57,000 meals between the day program and [facility] residents. We offer all members of the day program access to basic needs services, case management, and referrals to other community services and organizations.    [facility] operates a winter shelter from December 1 to March 31 each year. We provide emergency shelter, basic human services and case management to 120-130 unduplicated homeless men and women annually through this program.    CONNECT WITH US TODAY    DIRECTORY  [phone] - main line  x 206 -   x 252 - Naseem Swartz,  -  [email or web address]  x 251 Sharonda Koo MS,  - [email or web address]  x 254 Estelita Crum, [facility], Marketing, Non-meal providing Volunteers - [email or web address]  x 256 Lillian Wood,  - [email or web address]  Meal Providing - [email or web address]    Located at : [address] [place] [address]

## 2024-12-04 NOTE — SOCIAL WORK
Cm met with Pt. Cm informed Pt he has an interview with an [facility] center today at 10:30 am. Pt refused and reported he does not want to go to [facility]. Pt requested OP Tx. Pt reported he is going to one of his friend's house and will be staying there in the mean time. Pt reported he will be in the [place] area is and is open to OP Tx located in [place].   Cm informed Pt his D/C time is scheduled for 2pm and Pt reported his friend will be picking him up from the hospital upon discharge and Cm informed Pt that his medications were sent to  pharmacy and Pt will be given them upon discharge. Pt understood.   [facility]m called [facility] and got Pt an OP Tx appointment for Friday 12/6/24 at 12pm.

## 2024-12-04 NOTE — PROGRESS NOTES
12/04/24 0924   Team Meeting   Meeting Type Daily Rounds   Team Members Present   Team Members Present Physician;Nurse;   Physician Team Member Martha   [facility] Team [facility] Team Member Levi   Patient/Family Present   Patient Present No   Patient's Family Present No     Discharge today.

## 2024-12-04 NOTE — PLAN OF CARE
Problem: SELF HARM/SUICIDALITY  Goal: Will have no self-injury during hospital stay  Description: INTERVENTIONS:  - Q 15 MINUTES: Routine safety checks  - Q WAKING SHIFT & PRN: Assess risk to determine if routine checks are adequate to maintain patient safety  - Encourage patient to participate actively in care by formulating a plan to combat response to suicidal ideation, identify supports and resources  Outcome: Progressing     Problem: DEPRESSION  Goal: Will be euthymic at discharge  Description: INTERVENTIONS:  - Administer medication as ordered  - Provide emotional support via 1:1 interaction with staff  - Encourage involvement in milieu/groups/activities  - Monitor for social isolation  Outcome: Progressing     Problem: SUBSTANCE USE/ABUSE  Goal: Will have no detox symptoms and will verbalize plan for changing substance-related behavior  Description: INTERVENTIONS:  - Monitor physical status and assess for symptoms of withdrawal  - Administer medication as ordered  - Provide emotional support with 1 on 1 interaction with staff  - Encourage recovery focused program/ addiction education  - Assess for verbalization of changing behaviors related to substance abuse  - Initiate consults and referrals as appropriate (Case Management, Spiritual Care, etc.)  Outcome: Progressing  Goal: By discharge, will develop insight into their chemical dependency and sustain motivation to continue in recovery  Description: INTERVENTIONS:  - Attends all daily group sessions and scheduled AA groups  - Actively practices coping skills through participation in the therapeutic community and adherence to program rules  - Reviews and completes assignments from individual treatment plan  - Assist patient development of understanding of their personal cycle of addiction and relapse triggers  Outcome: Progressing  Goal: By discharge, patient will have ongoing treatment plan addressing chemical dependency  Description: INTERVENTIONS:  -  Assist patient with resources and/or appointments for ongoing recovery based living  Outcome: Progressing     Problem: Ineffective Coping  Goal: Identifies ineffective coping skills  Outcome: Progressing  Goal: Identifies healthy coping skills  Outcome: Progressing  Goal: Demonstrates healthy coping skills  Outcome: Progressing  Goal: Participates in unit activities  Description: Interventions:  - Provide therapeutic environment   - Provide required programming   - Redirect inappropriate behaviors   Outcome: Progressing

## 2024-12-04 NOTE — PLAN OF CARE
Pt still attending some groups, but has been attending less frequently. Pt appropriate when in group.

## 2025-01-31 ENCOUNTER — TELEMEDICINE (OUTPATIENT)
Dept: OTHER | Facility: HOSPITAL | Age: 35
End: 2025-01-31
Payer: COMMERCIAL

## 2025-01-31 DIAGNOSIS — K01.1 IMPACTED THIRD MOLAR TOOTH: Primary | ICD-10-CM

## 2025-01-31 PROBLEM — Z00.8 MEDICAL CLEARANCE FOR PSYCHIATRIC ADMISSION: Status: RESOLVED | Noted: 2023-06-18 | Resolved: 2025-01-31

## 2025-01-31 PROCEDURE — 99213 OFFICE O/P EST LOW 20 MIN: CPT | Performed by: PHYSICIAN ASSISTANT

## 2025-01-31 RX ORDER — CLINDAMYCIN HYDROCHLORIDE 300 MG/1
300 CAPSULE ORAL 4 TIMES DAILY
Qty: 28 CAPSULE | Refills: 0 | Status: SHIPPED | OUTPATIENT
Start: 2025-01-31 | End: 2025-02-07

## 2025-01-31 NOTE — LETTER
January 31, 2025     Patient: Raudel Bland   YOB: 1990   Date of Visit: 1/31/2025       To Whom it May Concern:    Raudel Bland is under my professional care. He was seen virtually on 1/31/2025. He may return to work on 2/1/25 .    If you have any questions or concerns, please don't hesitate to call.         Sincerely,          Shannon D Severino, PA-C        CC: No Recipients

## 2025-04-03 ENCOUNTER — TELEPHONE (OUTPATIENT)
Dept: OTHER | Facility: HOSPITAL | Age: 35
End: 2025-04-03

## 2025-04-03 ENCOUNTER — TELEMEDICINE (OUTPATIENT)
Dept: OTHER | Facility: HOSPITAL | Age: 35
End: 2025-04-03
Payer: COMMERCIAL

## 2025-04-03 DIAGNOSIS — F39 MOOD DISORDER (HCC): Primary | ICD-10-CM

## 2025-04-03 PROCEDURE — 99213 OFFICE O/P EST LOW 20 MIN: CPT | Performed by: NURSE PRACTITIONER

## 2025-04-03 NOTE — PROGRESS NOTES
Virtual Regular Visit  Name: Raudel Bland      : 1990      MRN: [id number]  Encounter Provider: JAM Thornton  Encounter Date: 4/3/2025   Encounter department: VIRTUAL CARE       Verification of patient location:  Patient is located at Home in the following state in which I hold an active license [place] :  Assessment & Plan  Mood disorder (HCC)    Orders:    Ambulatory referral to Psych Services; Future    Ambulatory Referral to Internal Medicine; Future    AMB E-CONSULT TO PSYCHIATRY    Will do econsult to psychiatry to see if I can recommendations to restarting medications since he has been off of them.  If they are able to give me recommendations will send medications to pharmacy for patient.  Patient will still need to establish care with new PCP and psychiatry for further management.        Encounter provider JAM Thornton    The patient was identified by name and date of birth. Raudel Bland was informed that this is a telemedicine visit and that the visit is being conducted through the Epic Embedded platform. He agrees to proceed..  My office door was closed. No one else was in the room.  He acknowledged consent and understanding of privacy and security of the video platform. The patient has agreed to participate and understands they can discontinue the visit at any time.    Patient is aware this is a billable service.     History obtained from: patient  History of Present Illness     This is a 35 year old male here today for video visit.  He states he has been out of his medication for while.  He has been having issues with his insurance.  He states he does not have PCP currently or does not.  He states he was on his Zoloft, Depakote, Zyprexa.  He has history of bipolar.  He states he has been having some issues with his mood swings.  He denies any thought of harming self or anyone else.  Upon reviewing his chart, he has had 2 admissions in the last 6 months for depression and  bipolar with no follow up care.  He does not currently have PCP or psychiatrist       Review of Systems   Constitutional: Negative.    Respiratory: Negative.     Cardiovascular: Negative.    Neurological: Negative.    Psychiatric/Behavioral:          Mood swings.       Objective   There were no vitals taken for this visit.    Physical Exam  Constitutional:       General: He is not in acute distress.     Appearance: He is not toxic-appearing.      Comments: Smoking cigarette during video visit.    HENT:      Head: Normocephalic and atraumatic.   Pulmonary:      Effort: Pulmonary effort is normal. No respiratory distress.   Neurological:      Mental Status: He is alert and oriented to person, place, and time.   Psychiatric:         Mood and Affect: Mood normal.         Behavior: Behavior normal.         Thought Content: Thought content normal.         Judgment: Judgment normal.         Visit Time  Total Visit Duration: 10 minutes not including the time spent for establishing the audio/video connection.

## 2025-04-03 NOTE — TELEPHONE ENCOUNTER
Pt called for refills on medication that was prescribed to him in the hospital.  He has no PCP.  He stated they were supposed to set him up with a PCP.  He will try to figure out who the dr will be and will call us back.

## 2025-04-03 NOTE — PATIENT INSTRUCTIONS
Econsult placed to phsyciatry to see if I can get recommendations to restart your medications safely.  You will still need to call and follow up with psychiatry and establish with new PCP.  If any symptoms get worse go to ER to be seen.

## 2025-04-04 ENCOUNTER — E-CONSULT (OUTPATIENT)
Dept: PSYCHIATRY | Facility: CLINIC | Age: 35
End: 2025-04-04
Payer: COMMERCIAL

## 2025-04-04 PROCEDURE — 99451 NTRPROF PH1/NTRNET/EHR 5/>: CPT | Performed by: PSYCHIATRY & NEUROLOGY

## 2025-04-04 NOTE — PROGRESS NOTES
E-Consult  Raudel Bland 35 y.o. male MRN: [id number]  Encounter Date: 04/04/25      Reason for Consult / Principal Problem: This is a 35 year old male with history of bipolar.  He has had several admission for depession and bipolar in the last 6 months.  He states he has been having his medications for at least several weeks.  Looking for recommendations to restart meds.     Consulting Provider: Padmini Alfaro MD    Requesting Provider: Thu Riggins CRNP       ASSESSMENT:  35 year old male with mood disorder, bipolar vv substance induced mood disorder. Last hospitalized Nov 2024. Discharge medications include:       Divalproex Sodium 750 mg Oral Daily at bedtime    Ergocalciferol 50,000 Units Oral Weekly    Folic Acid 1 mg Oral Daily    Sertraline HCl 100 mg Oral Daily    traZODone HCl 50 mg Oral Daily at bedtime    RECOMMENDATIONS:  Patient wants to resume medications and can restart at the above stated doses except for sertraline which I recommend to start at 50 mg daily.  Obtain VA levels and adjust Depakote dose accordingly.    Total time spent 5-10 minutes, >50% of the total time devoted to medical consultative verbal/EMR discussion between providers. Written report will be generated in the EMR. .

## 2025-04-07 ENCOUNTER — TELEPHONE (OUTPATIENT)
Dept: OTHER | Facility: HOSPITAL | Age: 35
End: 2025-04-07

## 2025-04-07 NOTE — TELEPHONE ENCOUNTER
Attempted to  call patient to discuss restarting his medications as I did econsult.  No answer.  Left message to return call. I also did send him a message via [email or web address]. I would be will to restart zoloft if patient establishes follow up.

## 2025-04-16 DIAGNOSIS — F32.A DEPRESSION, UNSPECIFIED DEPRESSION TYPE: Primary | ICD-10-CM

## 2025-04-16 NOTE — PROGRESS NOTES
Patient returned call today.  Discussed with him I can restart his Zoloft at psychiatry recommendations.  He states he is going to call and establish care with new PCP.  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  He denies any thought of self harm or harming himself.  He states he has felt more agitated and would really like to get back on his zoloft.  Will hold off on trazodone and depakote until he is seen.  Discussed with him if symptoms get worse or he has any thoughts of self harm to go directly to ER.

## 2025-04-18 ENCOUNTER — TELEPHONE (OUTPATIENT)
Age: 35
End: 2025-04-18